# Patient Record
Sex: FEMALE | Race: WHITE | NOT HISPANIC OR LATINO | Employment: FULL TIME | URBAN - NONMETROPOLITAN AREA
[De-identification: names, ages, dates, MRNs, and addresses within clinical notes are randomized per-mention and may not be internally consistent; named-entity substitution may affect disease eponyms.]

---

## 2017-03-08 ENCOUNTER — TRANSFERRED RECORDS (OUTPATIENT)
Dept: HEALTH INFORMATION MANAGEMENT | Facility: HOSPITAL | Age: 22
End: 2017-03-08

## 2017-04-19 ENCOUNTER — TRANSFERRED RECORDS (OUTPATIENT)
Dept: HEALTH INFORMATION MANAGEMENT | Facility: HOSPITAL | Age: 22
End: 2017-04-19

## 2017-05-22 DIAGNOSIS — O26.90 PREGNANCY RELATED CONDITION, UNSPECIFIED TRIMESTER: Primary | ICD-10-CM

## 2017-05-30 ENCOUNTER — PRE VISIT (OUTPATIENT)
Dept: MATERNAL FETAL MEDICINE | Facility: CLINIC | Age: 22
End: 2017-05-30

## 2017-06-02 ENCOUNTER — OFFICE VISIT (OUTPATIENT)
Dept: MATERNAL FETAL MEDICINE | Facility: CLINIC | Age: 22
End: 2017-06-02
Attending: OBSTETRICS & GYNECOLOGY
Payer: COMMERCIAL

## 2017-06-02 ENCOUNTER — HOSPITAL ENCOUNTER (OUTPATIENT)
Dept: ULTRASOUND IMAGING | Facility: CLINIC | Age: 22
Discharge: HOME | End: 2017-06-02
Attending: OBSTETRICS & GYNECOLOGY | Admitting: OBSTETRICS & GYNECOLOGY
Payer: COMMERCIAL

## 2017-06-02 DIAGNOSIS — Z82.79 FAMILY HISTORY OF CONGENITAL ANOMALIES: Primary | ICD-10-CM

## 2017-06-02 DIAGNOSIS — O26.90 PREGNANCY RELATED CONDITION, UNSPECIFIED TRIMESTER: ICD-10-CM

## 2017-06-02 PROCEDURE — 76811 OB US DETAILED SNGL FETUS: CPT

## 2017-06-02 PROCEDURE — 96040 ZZH GENETIC COUNSELING, EACH 30 MINUTES: CPT | Mod: ZF | Performed by: GENETIC COUNSELOR, MS

## 2017-06-02 NOTE — MR AVS SNAPSHOT
"              After Visit Summary   2017    Valeria Thibodeaux    MRN: [id number]           Patient Information     Date Of Birth          1995        Visit Information        Provider Department      2017 12:15 PM Rabia Payne MD [facility]        Today's Diagnoses     Family history of congenital anomalies    -  1       Follow-ups after your visit        Who to contact     If you have questions or need follow up information about today's clinic visit or your schedule please contact [facility] F[facility]AL [facility] directly at [phone].  Normal or non-critical lab and imaging results will be communicated to you by Sanguinehart, letter or phone within 4 business days after the clinic has received the results. If you do not hear from us within 7 days, please contact the clinic through Redingtont or phone. If you have a critical or abnormal lab result, we will notify you by phone as soon as possible.  Submit refill requests through [employer] or call your pharmacy and they will forward the refill request to us. Please allow 3 business days for your refill to be completed.          Additional Information About Your Visit        MyChart Information     [employer] lets you send messages to your doctor, view your test results, renew your prescriptions, schedule appointments and more. To sign up, go to [email or web address] . Click on \"Log in\" on the left side of the screen, which will take you to the Welcome page. Then click on \"Sign up Now\" on the right side of the page.     You will be asked to enter the access code listed below, as well as some personal information. Please follow the directions to create your username and password.     Your access code is: [email or web address]  Expires: 2017  1:38 PM     Your access code will  in 90 days. If you need help or a new code, please call your [place] clinic or [phone].        Care EveryWhere ID     This is your Care " EveryWhere ID. This could be used by other organizations to access your [place] medical records  [id number]        Your Vitals Were     Last Period                   01/02/2017            Blood Pressure from Last 3 Encounters:   07/07/15 102/80    Weight from Last 3 Encounters:   07/07/15 55.3 kg (122 lb)              Today, you had the following     No orders found for display       Primary Care Provider    None       No address on file        Thank you!     Thank you for choosing [facility]  for your care. Our goal is always to provide you with excellent care. Hearing back from our patients is one way we can continue to improve our services. Please take a few minutes to complete the written survey that you may receive in the mail after your visit with us. Thank you!             Your Updated Medication List - Protect others around you: Learn how to safely use, store and throw away your medicines at www.disposemymeds.org.          This list is accurate as of: 6/2/17  1:38 PM.  Always use your most recent med list.                   Brand Name Dispense Instructions for use    desogestrel-ethinyl estradiol 0.15-0.02/0.01 MG (21/5) per tablet    MIRCETTE    3 Package    Take 1 tablet by mouth daily       ibuprofen 600 MG tablet    ADVIL/MOTRIN     Take 1 tablet by mouth 3 times daily (with meals).

## 2017-06-02 NOTE — PROGRESS NOTES
[facility]  Genetic Counseling Consult    Patient: Valeria Thibodeaux YOB: 1995       Valeria Thibodeaux was seen at the [facility] for genetic consultation as part of her appointment for comprehensive ultrasound.  The indication for genetic counseling is family history of aortic stenosis.       Impression/Plan:   Valeria's paternal male cousin was born with aortic stenosis and had a valve replacement. We reviewed the multifactorial inheritance associated with congenital left-sided heart defects. Valeria had a comprehensive (level II) ultrasound today.  Please see the ultrasound report for further details.    Pregnancy History:   /Parity:      Age at Delivery: 22 year old  REGINO: 10/9/2017, by Last Menstrual Period  Gestational Age: 21w4d    No significant complications or exposures were reported in the current pregnancy.    Family History:   A three-generation pedigree was obtained, and is scanned under the  Media  tab.   The following significant findings were reported by Valeria:    Valeria's paternal male first cousin has aortic stenosis. She is uncertain if he had a bicuspid aortic valve but she  did reported that her cousin had a valve replacement. He is now 27 years old and reportedly healthy.     Valeria also reported that her paternal uncle and paternal had a history of myocardial infarction. Her paternal grandfather  at age 49 of a myocardial infarction.    Per the patient, her father has had a normal echocardiogram.     We discussed that congenital heart defects can be caused by genetic factors, chromosomal abnormalities, or environmental exposures. Most isolated congential heart defects are likely caused by the combination of several genetic and environmental factors and have a multifactorial inheritance pattern. Research has shown a significant genetic component to isolated left sided  "congenital heart defects which includes a spectrum of cardiac anomalies ranging from bicuspid aortic valve at the milder end of the spectrum to hypoplastic left heart at the more severe end of the spectrum. The recurrence risk for a first degree relative to have a left-sided congenital heart defect is approximately 8%. An echocardiogram is recommended to screen for congenital heart defects in first degree relatives. The recurrence risk decreases with increasing degree in relationship to other family members; Valeria's pregnancy is not a first degree relative to her cousin, so the risk for a congenital heart defect may not be significantly increased.     Otherwise, the reported family history is negative for multiple miscarriages, stillbirths, birth defects, mental retardation, known genetic conditions, and consanguinity.          Risk Assessment & Testing Options:   Given the family history of aortic stenosis, we discussed screening with a comprehensive level II ultrasound.   o Comprehensive (Level II) ultrasound: Detailed ultrasound performed between 18-22 weeks gestation to screen for major birth defects and markers for aneuploidy.    We also discussed aneuploidy testing in pregnancy and Valeria had maternal serum screening earlier in pregnancy.  Quad screen - Screening for Down syndrome,trisomy 18, and open neural tube defects    Valeria reported that she had a Quad screen through her primary OB and the results were \"normal.\" A copy was not provided to us but the patient was able to access these results from her electronic medical record and provided the following information:  o Down syndrome risk= 1:706  - I reviewed with Valeria that her Quad screen result for Down syndrome is higher than her age related risk of 1 in 1087. However, the performing laboratory considers this result screen negative since it is below their cutoff value.  o Trisomy 18 risk= 1:66,500    It was a pleasure to be involved " with Valeria sung. Face-to-face time of the meeting was 35 minutes.    Jessica Sims MS, [facility]  Maternal Fetal Medicine  [facility]  Phone:[phone]  Email: [email or web address]

## 2017-06-02 NOTE — MR AVS SNAPSHOT
"              After Visit Summary   2017    Valeria Thibodeaux    MRN: [id number]           Patient Information     Date Of Birth          1995        Visit Information        Provider Department      2017 11:00 AM Jessica Sims [facility]        Today's Diagnoses     Pregnancy related condition, unspecified trimester           Follow-ups after your visit        Who to contact     If you have questions or need follow up information about today's clinic visit or your schedule please contact [facility] FETAL [facility] directly at [phone].  Normal or non-critical lab and imaging results will be communicated to you by Sqrlhart, letter or phone within 4 business days after the clinic has received the results. If you do not hear from us within 7 days, please contact the clinic through [employer] or phone. If you have a critical or abnormal lab result, we will notify you by phone as soon as possible.  Submit refill requests through Ne[employer]le[employer] or call your pharmacy and they will forward the refill request to us. Please allow 3 business days for your refill to be completed.          Additional Information About Your Visit        MyChart Information     [employer] lets you send messages to your doctor, view your test results, renew your prescriptions, schedule appointments and more. To sign up, go to [email or web address] . Click on \"Log in\" on the left side of the screen, which will take you to the Welcome page. Then click on \"Sign up Now\" on the right side of the page.     You will be asked to enter the access code listed below, as well as some personal information. Please follow the directions to create your username and password.     Your access code is: [email or web address]  Expires: 2017  1:38 PM     Your access code will  in 90 days. If you need help or a new code, please call your [place] clinic or [phone].        Care EveryWhere ID     This is " your Care EveryWhere ID. This could be used by other organizations to access your [place] medical records  [id number]        Your Vitals Were     Last Period                   01/02/2017            Blood Pressure from Last 3 Encounters:   07/07/15 102/80    Weight from Last 3 Encounters:   07/07/15 55.3 kg (122 lb)              We Performed the Following     [facility] Genetic Counseling        Primary Care Provider    None       No address on file        Thank you!     Thank you for choosing [facility]  for your care. Our goal is always to provide you with excellent care. Hearing back from our patients is one way we can continue to improve our services. Please take a few minutes to complete the written survey that you may receive in the mail after your visit with us. Thank you!             Your Updated Medication List - Protect others around you: Learn how to safely use, store and throw away your medicines at www.disposemymeds.org.          This list is accurate as of: 6/2/17  3:25 PM.  Always use your most recent med list.                   Brand Name Dispense Instructions for use    desogestrel-ethinyl estradiol 0.15-0.02/0.01 MG (21/5) per tablet    MIRCETTE    3 Package    Take 1 tablet by mouth daily       ibuprofen 600 MG tablet    ADVIL/MOTRIN     Take 1 tablet by mouth 3 times daily (with meals).

## 2017-06-28 LAB
RUBELLA ABY IGG: >0.99
RUBELLA ANTIBODY IGG QUANTITATIVE: NORMAL IU/ML

## 2017-07-17 ENCOUNTER — CARE COORDINATION (OUTPATIENT)
Dept: CARE COORDINATION | Facility: CLINIC | Age: 22
End: 2017-07-17

## 2017-07-21 ENCOUNTER — TRANSFERRED RECORDS (OUTPATIENT)
Dept: HEALTH INFORMATION MANAGEMENT | Facility: HOSPITAL | Age: 22
End: 2017-07-21

## 2017-07-21 LAB
C TRACH DNA SPEC QL PROBE+SIG AMP: NEGATIVE
C TRACH DNA SPEC QL PROBE+SIG AMP: NEGATIVE
HEP C HIM: NORMAL
HIV 1+2 AB+HIV1 P24 AG SERPL QL IA: NON REACTIVE
N GONORRHOEA DNA SPEC QL PROBE+SIG AMP: NEGATIVE
N GONORRHOEA DNA SPEC QL PROBE+SIG AMP: NEGATIVE
SPECIMEN DESCRIP: NORMAL
SPECIMEN DESCRIPTION: NORMAL
T PALLIDUM IGG SER QL: NON REACTIVE

## 2017-08-04 ENCOUNTER — TRANSFERRED RECORDS (OUTPATIENT)
Dept: HEALTH INFORMATION MANAGEMENT | Facility: HOSPITAL | Age: 22
End: 2017-08-04

## 2017-08-22 ENCOUNTER — CARE COORDINATION (OUTPATIENT)
Dept: CARE COORDINATION | Facility: CLINIC | Age: 22
End: 2017-08-22

## 2017-09-01 ENCOUNTER — TRANSFERRED RECORDS (OUTPATIENT)
Dept: HEALTH INFORMATION MANAGEMENT | Facility: HOSPITAL | Age: 22
End: 2017-09-01

## 2017-09-01 LAB
C TRACH DNA SPEC QL PROBE+SIG AMP: NEGATIVE
HEP C HIM: NORMAL
N GONORRHOEA DNA SPEC QL PROBE+SIG AMP: NEGATIVE
SPECIMEN DESCRIP: NORMAL
SPECIMEN DESCRIPTION: NORMAL

## 2017-09-05 ENCOUNTER — TELEPHONE (OUTPATIENT)
Dept: FAMILY MEDICINE | Facility: OTHER | Age: 22
End: 2017-09-05

## 2017-09-05 NOTE — TELEPHONE ENCOUNTER
2:12 PM    Reason for Call: OVERBOOK    Patient is having the following symptoms: wants to be a new ob with  was seen in the [facility] for this and wants to transfer up here to her she is 35 weeks pregnant .    The patient is requesting an appointment for ASAP with .    Was an appointment offered for this call? No  If yes : Appointment type              Date    Preferred method for responding to this message: Telephone Call  What is your phone number ?    If we cannot reach you directly, may we leave a detailed response at the number you provided? Yes    Can this message wait until your PCP/provider returns, if unavailable today? Not applicable,     Saba Aparicio

## 2017-09-05 NOTE — TELEPHONE ENCOUNTER
I will be happy to take care of the baby at delivery, but I generally do not take new OB patients this far into their pregnancies.  She can possibly establish with Ryan Hansen here, or see OB in [place].

## 2017-09-13 ENCOUNTER — TRANSFERRED RECORDS (OUTPATIENT)
Dept: HEALTH INFORMATION MANAGEMENT | Facility: HOSPITAL | Age: 22
End: 2017-09-13

## 2017-09-13 ENCOUNTER — CARE COORDINATION (OUTPATIENT)
Dept: CARE COORDINATION | Facility: CLINIC | Age: 22
End: 2017-09-13

## 2017-09-14 ENCOUNTER — PRENATAL OFFICE VISIT (OUTPATIENT)
Dept: OBGYN | Facility: OTHER | Age: 22
End: 2017-09-14
Attending: ADVANCED PRACTICE MIDWIFE
Payer: COMMERCIAL

## 2017-09-14 VITALS
WEIGHT: 152 LBS | DIASTOLIC BLOOD PRESSURE: 76 MMHG | SYSTOLIC BLOOD PRESSURE: 106 MMHG | BODY MASS INDEX: 30.64 KG/M2 | HEIGHT: 59 IN

## 2017-09-14 DIAGNOSIS — O99.810 GLUCOSE INTOLERANCE OF PREGNANCY: Primary | ICD-10-CM

## 2017-09-14 DIAGNOSIS — Z34.03 SUPERVISION OF NORMAL FIRST TEEN PREGNANCY IN THIRD TRIMESTER: ICD-10-CM

## 2017-09-14 DIAGNOSIS — Z23 NEED FOR PROPHYLACTIC VACCINATION AND INOCULATION AGAINST INFLUENZA: ICD-10-CM

## 2017-09-14 PROBLEM — F90.9 ADHD (ATTENTION DEFICIT HYPERACTIVITY DISORDER): Status: ACTIVE | Noted: 2017-09-14

## 2017-09-14 PROCEDURE — 87653 STREP B DNA AMP PROBE: CPT | Performed by: ADVANCED PRACTICE MIDWIFE

## 2017-09-14 PROCEDURE — 76815 OB US LIMITED FETUS(S): CPT | Performed by: ADVANCED PRACTICE MIDWIFE

## 2017-09-14 PROCEDURE — 90471 IMMUNIZATION ADMIN: CPT | Performed by: ADVANCED PRACTICE MIDWIFE

## 2017-09-14 PROCEDURE — 90686 IIV4 VACC NO PRSV 0.5 ML IM: CPT | Performed by: ADVANCED PRACTICE MIDWIFE

## 2017-09-14 PROCEDURE — 99207 ZZC FIRST OB VISIT: CPT | Mod: 25 | Performed by: ADVANCED PRACTICE MIDWIFE

## 2017-09-14 RX ORDER — PRENATAL VIT/IRON FUM/FOLIC AC 27MG-0.8MG
1 TABLET ORAL DAILY
COMMUNITY
End: 2018-01-17

## 2017-09-14 NOTE — MR AVS SNAPSHOT
After Visit Summary   9/14/2017    Valeria Thibodeaux    MRN: [id number]           Patient Information     Date Of Birth          1995        Visit Information        Provider Department      9/14/2017 10:30 AM Ryan Hansen APRN CNM [facility]        Today's Diagnoses     Glucose intolerance of pregnancy    -  1    Supervision of normal first teen pregnancy in third trimester        Need for prophylactic vaccination and inoculation against influenza          Care Instructions    Return to office in one week for prenatal care and as needed.    Thank you for allowing Ryan SANDRA CNM and our OB team to participate in your care.  If you have a scheduling or an appointment question please contact [facility] Health Unit Coordinator at their direct line [phone].   ALL nursing questions or concerns can be directed to your OB nurse at: [phone] - leah                  Follow-ups after your visit        Additional Services     DIABETES EDUCATION REFERRAL ([place])       Your provider [facility] [place]    This is a New Diagnosis  Type of diabetes is Glucose Intolerance in pregnancy.                                                         Diabetes education needs: Diabetic Education. Low Glycemic Diet. Teach blood glucose monitoring. This patient is to learn how to test, but does not need to start testing at home unless diagnosed with gestational diabetes or directed by Dr. To.  Request focused on: Low glycemic diet and teaching home blood glucose monitoring.  Education needs: None       Please be aware that coverage of these services is subject to the terms and limitations of your health insurance plan.  Call member services at your health plan with any benefit or coverage questions.      Please bring the following to your appointment:    >>   Any x-rays, CTs or MRIs which have been performed.  Contact the facility where they were  done to arrange for  prior to your scheduled appointment.  Any new CT, MRI or other procedures ordered by your specialist must be performed at a [place] facility or coordinated by your clinic's referral office.    >>   List of current medications   >>   This referral request   >>   Any documents/labs given to you for this referral                  Your next 10 appointments already scheduled     Sep 18, 2017 11:30 AM CDT   (Arrive by 11:15 AM)   ESTABLISHED PRENATAL with JOCY Garcia CNM   [facility] [place] ([facility] [place] )    [address]  [place] [address]   [phone]              Future tests that were ordered for you today     Open Future Orders        Priority Expected Expires Ordered    Hemoglobin A1c Routine 9/15/2017 9/14/2018 9/14/2017    CBC with platelets Routine 9/15/2017 9/14/2018 9/14/2017    Glucose Routine 9/14/2017 10/14/2017 9/14/2017    Glucose 2 hr pp 75 gm dose Routine 9/14/2017 10/14/2017 9/14/2017    FETAL NON-STRESS TEST ASAP 9/14/2017 10/14/2017 9/14/2017            Who to contact     If you have questions or need follow up information about today's clinic visit or your schedule please contact [facility] directly at [phone].  Normal or non-critical lab and imaging results will be communicated to you by MyChart, letter or phone within 4 business days after the clinic has received the results. If you do not hear from us within 7 days, please contact the clinic through MyChart or phone. If you have a critical or abnormal lab result, we will notify you by phone as soon as possible.  Submit refill requests through [employer] or call your pharmacy and they will forward the refill request to us. Please allow 3 business days for your refill to be completed.          Additional Information About Your Visit        [employer] Information     [employer] lets you send messages to your doctor, view your test results, renew your prescriptions,  "schedule appointments and more. To sign up, go to [email or web address] . Click on \"Log in\" on the left side of the screen, which will take you to the Welcome page. Then click on \"Sign up Now\" on the right side of the page.     You will be asked to enter the access code listed below, as well as some personal information. Please follow the directions to create your username and password.     Your access code is: [email or web address]  Expires: 2017  4:49 PM     Your access code will  in 90 days. If you need help or a new code, please call your [place] clinic or [phone].        Care EveryWhere ID     This is your Care EveryWhere ID. This could be used by other organizations to access your [place] medical records  [id number]        Your Vitals Were     Height Last Period BMI (Body Mass Index)             4' 11\" (1.499 m) 2017 30.7 kg/m2          Blood Pressure from Last 3 Encounters:   17 106/76   07/07/15 102/80    Weight from Last 3 Encounters:   17 152 lb (68.9 kg)   07/07/15 122 lb (55.3 kg)              We Performed the Following     DIABETES EDUCATION REFERRAL ([place])     FLU VAC, SPLIT VIRUS IM > 3 YO (QUADRIVALENT) [35486]     Group B strep PCR     US OB Biophys Single Gestation Measure     Vaccine Administration, Initial [84436]        Primary Care Provider    None       No address on file        Equal Access to Services     [place] : Hadii rober zimmermano Sosumit, waaxda luqadaha, qaybta kaalmada liz patel [facility] arita . So [facility] [phone].    ATENCIÓN: Si habla español, tiene a rodriguez disposición servicios gratuitos de asistencia lingüística. Llame al [phone].    We comply with applicable federal civil rights laws and [place] laws. We do not discriminate on the basis of race, color, national origin, age, disability sex, sexual orientation or gender identity.            Thank you!     Thank you for choosing [facility]  for " your care. Our goal is always to provide you with excellent care. Hearing back from our patients is one way we can continue to improve our services. Please take a few minutes to complete the written survey that you may receive in the mail after your visit with us. Thank you!             Your Updated Medication List - Protect others around you: Learn how to safely use, store and throw away your medicines at www.disposemymeds.org.          This list is accurate as of: 9/14/17  4:49 PM.  Always use your most recent med list.                   Brand Name Dispense Instructions for use Diagnosis    prenatal multivitamin plus iron 27-0.8 MG Tabs per tablet      Take 1 tablet by mouth daily        ZOLOFT PO      Take 50 mg by mouth daily

## 2017-09-14 NOTE — PROGRESS NOTES
Breastfeeding education provided:  - Supporting a non-medicated birth  - Skin to Skin  - Non-separation of mother and baby    Adriane Bautista

## 2017-09-14 NOTE — PATIENT INSTRUCTIONS
Return to office in one week for prenatal care and as needed.    Thank you for allowing Ryan SANDRA CNM and our OB team to participate in your care.  If you have a scheduling or an appointment question please contact [facility] Health Unit Coordinator at their direct line [phone].   ALL nursing questions or concerns can be directed to your OB nurse at: [phone] - Laura
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
## 2017-09-14 NOTE — PROGRESS NOTES
Injectable Influenza Immunization Documentation    1.  Are you sick today? (Fever of 100.5 or higher on the day of the clinic)   No    2.  Have you ever had Guillain-Raynham Syndrome within 6 weeks of an influenza vaccionation?  No    3. Do you have a life-threatening allergy to eggs?  No    4. Do you have a life-threatening allergy to a component of the vaccine? May include antibiotics, gelatin or latex.  No     5. Have you ever had a reaction to a dose of flu vaccine that needed immediate medical attention?  No     Form completed by Betzy Macias

## 2017-09-15 LAB
GP B STREP DNA SPEC QL NAA+PROBE: NEGATIVE
SPECIMEN SOURCE: NORMAL

## 2017-09-20 ENCOUNTER — PRENATAL OFFICE VISIT (OUTPATIENT)
Dept: OBGYN | Facility: OTHER | Age: 22
End: 2017-09-20
Attending: ADVANCED PRACTICE MIDWIFE
Payer: COMMERCIAL

## 2017-09-20 ENCOUNTER — HOSPITAL ENCOUNTER (OUTPATIENT)
Dept: ULTRASOUND IMAGING | Facility: HOSPITAL | Age: 22
Discharge: HOME | End: 2017-09-20
Attending: ADVANCED PRACTICE MIDWIFE | Admitting: ADVANCED PRACTICE MIDWIFE
Payer: COMMERCIAL

## 2017-09-20 ENCOUNTER — HOSPITAL ENCOUNTER (OUTPATIENT)
Facility: HOSPITAL | Age: 22
Discharge: HOME | End: 2017-09-20
Attending: ADVANCED PRACTICE MIDWIFE | Admitting: ADVANCED PRACTICE MIDWIFE
Payer: COMMERCIAL

## 2017-09-20 VITALS
TEMPERATURE: 98.1 F | HEIGHT: 60 IN | BODY MASS INDEX: 30.43 KG/M2 | RESPIRATION RATE: 16 BRPM | SYSTOLIC BLOOD PRESSURE: 112 MMHG | DIASTOLIC BLOOD PRESSURE: 72 MMHG | HEART RATE: 85 BPM | WEIGHT: 155 LBS

## 2017-09-20 VITALS — SYSTOLIC BLOOD PRESSURE: 112 MMHG | DIASTOLIC BLOOD PRESSURE: 78 MMHG | HEART RATE: 78 BPM

## 2017-09-20 DIAGNOSIS — Z34.03 SUPERVISION OF NORMAL FIRST PREGNANCY IN THIRD TRIMESTER: ICD-10-CM

## 2017-09-20 DIAGNOSIS — Z34.03 SUPERVISION OF NORMAL FIRST TEEN PREGNANCY IN THIRD TRIMESTER: ICD-10-CM

## 2017-09-20 DIAGNOSIS — O99.810 GLUCOSE INTOLERANCE OF PREGNANCY: ICD-10-CM

## 2017-09-20 PROBLEM — Z36.89 ENCOUNTER FOR TRIAGE IN PREGNANT PATIENT: Status: ACTIVE | Noted: 2017-09-20

## 2017-09-20 LAB
ABO + RH BLD: NORMAL
ABO + RH BLD: NORMAL
AMPHETAMINES UR QL SCN: NEGATIVE
BARBITURATES UR QL: NEGATIVE
BENZODIAZ UR QL: NEGATIVE
BLD GP AB SCN SERPL QL: NORMAL
BLOOD BANK CMNT PATIENT-IMP: NORMAL
CANNABINOIDS UR QL SCN: NEGATIVE
COCAINE UR QL: NEGATIVE
ERYTHROCYTE [DISTWIDTH] IN BLOOD BY AUTOMATED COUNT: 12.2 % (ref 10–15)
EST. AVERAGE GLUCOSE BLD GHB EST-MCNC: 114 MG/DL
GLUCOSE 2H P 75 G GLC PO SERPL-MCNC: 123 MG/DL (ref 60–200)
GLUCOSE BLD-MCNC: 74 MG/DL (ref 70–99)
HBA1C MFR BLD: 5.6 % (ref 4.3–6)
HCT VFR BLD AUTO: 29 % (ref 35–47)
HGB BLD-MCNC: 9.7 G/DL (ref 11.7–15.7)
MCH RBC QN AUTO: 29.7 PG (ref 26.5–33)
MCHC RBC AUTO-ENTMCNC: 33.4 G/DL (ref 31.5–36.5)
MCV RBC AUTO: 89 FL (ref 78–100)
METHADONE UR QL SCN: NEGATIVE
OPIATES UR QL SCN: NEGATIVE
PCP UR QL SCN: NEGATIVE
PLATELET # BLD AUTO: 191 10E9/L (ref 150–450)
RBC # BLD AUTO: 3.27 10E12/L (ref 3.8–5.2)
SPECIMEN EXP DATE BLD: NORMAL
WBC # BLD AUTO: 6.8 10E9/L (ref 4–11)

## 2017-09-20 PROCEDURE — 36415 COLL VENOUS BLD VENIPUNCTURE: CPT | Performed by: ADVANCED PRACTICE MIDWIFE

## 2017-09-20 PROCEDURE — 86901 BLOOD TYPING SEROLOGIC RH(D): CPT | Performed by: ADVANCED PRACTICE MIDWIFE

## 2017-09-20 PROCEDURE — 80307 DRUG TEST PRSMV CHEM ANLYZR: CPT | Performed by: ADVANCED PRACTICE MIDWIFE

## 2017-09-20 PROCEDURE — 59025 FETAL NON-STRESS TEST: CPT | Mod: 26 | Performed by: ADVANCED PRACTICE MIDWIFE

## 2017-09-20 PROCEDURE — 85027 COMPLETE CBC AUTOMATED: CPT | Performed by: ADVANCED PRACTICE MIDWIFE

## 2017-09-20 PROCEDURE — 86900 BLOOD TYPING SEROLOGIC ABO: CPT | Performed by: ADVANCED PRACTICE MIDWIFE

## 2017-09-20 PROCEDURE — 59025 FETAL NON-STRESS TEST: CPT

## 2017-09-20 PROCEDURE — 82947 ASSAY GLUCOSE BLOOD QUANT: CPT | Performed by: ADVANCED PRACTICE MIDWIFE

## 2017-09-20 PROCEDURE — 82950 GLUCOSE TEST: CPT | Performed by: ADVANCED PRACTICE MIDWIFE

## 2017-09-20 PROCEDURE — 86850 RBC ANTIBODY SCREEN: CPT | Performed by: ADVANCED PRACTICE MIDWIFE

## 2017-09-20 PROCEDURE — 76819 FETAL BIOPHYS PROFIL W/O NST: CPT | Mod: TC

## 2017-09-20 PROCEDURE — 76816 OB US FOLLOW-UP PER FETUS: CPT | Mod: TC

## 2017-09-20 PROCEDURE — 99207 ZZC COMPLICATED OB VISIT: CPT | Performed by: ADVANCED PRACTICE MIDWIFE

## 2017-09-20 PROCEDURE — 83036 HEMOGLOBIN GLYCOSYLATED A1C: CPT | Performed by: ADVANCED PRACTICE MIDWIFE

## 2017-09-20 ASSESSMENT — PAIN SCALES - GENERAL: PAINLEVEL: NO PAIN (0)

## 2017-09-20 NOTE — PATIENT INSTRUCTIONS
Return to office next week for prenatal care and as needed.    Thank you for allowing Ryan SANDRA CNM and our OB team to participate in your care.  If you have a scheduling or an appointment question please contact [facility] Coordinator at their direct line [phone].   ALL nursing questions or concerns can be directed to your OB nurse at: [phone] - Laura

## 2017-09-20 NOTE — PLAN OF CARE
Valeria Thibodeaux is a 22 year old  and 37w2d patient came in for Non Stress Test    Patient Active Problem List   Diagnosis     Tobacco dependence     ADHD (attention deficit hyperactivity disorder)     Supervision of normal first pregnancy in third trimester     Glucose intolerance of pregnancy     Encounter for triage in pregnant patient       Pt discharged to home at 11:36 AM and encouraged to rest and drink plenty of fluids.  Pt told to call/return if bleeding more than spotting, water breaks, contractions 3-5 minutes apart that she has to breath through.     Nursing education on pregnancy risks provided. Self-management instructions reviewed.AVS given and signed. All questions answered and patient verbalizes understanding.    /72  Pulse 85  Temp 98.1  F (36.7  C) (Oral)  Resp 16  Ht 1.524 m (5')  Wt 70.3 kg (155 lb)  LMP 2017  BMI 30.27 kg/m2    Cervical status: not examined  Fetal Assessment: Reactive    Discharge support:  Independent-Alone    Obstetric History       T0      L0     SAB0   TAB0   Ectopic0   Multiple0   Live Births0       # Outcome Date GA Lbr Mark/2nd Weight Sex Delivery Anes PTL Lv   1 Current                   JOHAN ELAM

## 2017-09-20 NOTE — PROGRESS NOTES
Doing well.  Baby active.  Denies bleeding or LOF.  Reports contractions/BH every day.    Discussed:  Labor, has [facility]BC number, Pain meds for labor, PIH, kick counts, BF.  GTT 2 hr today, antibody screen, and UDS    US today at 10:00 am  Rec'd prenatals via Fax     Plan to discuss NB cares next week.    RTO in one week for prenatal care and as needed.

## 2017-09-20 NOTE — NURSING NOTE
"Chief Complaint   Patient presents with     Prenatal Care       Initial /78  Pulse 78  LMP 01/02/2017 Estimated body mass index is 30.7 kg/(m^2) as calculated from the following:    Height as of 9/14/17: 4' 11\" (1.499 m).    Weight as of 9/14/17: 152 lb (68.9 kg).  Medication Reconciliation: complete   Adriane Bautista      "

## 2017-09-20 NOTE — MR AVS SNAPSHOT
After Visit Summary   9/20/2017    Vlaeria Thibodeaux    MRN: [id number]           Patient Information     Date Of Birth          1995        Visit Information        Provider Department      9/20/2017 9:00 AM Ryan Hansen APRN CNM [facility]        Today's Diagnoses     Supervision of normal first pregnancy in third trimester          Care Instructions    Return to office next week for prenatal care and as needed.    Thank you for allowing Ryan SANDRA CNM and our OB team to participate in your care.  If you have a scheduling or an appointment question please contact [facility] at their direct line [phone].   ALL nursing questions or concerns can be directed to your OB nurse at: [phone] - Laura              Follow-ups after your visit        Your next 10 appointments already scheduled     Sep 27, 2017 11:00 AM CDT   (Arrive by 10:45 AM)   ESTABLISHED PRENATAL with JOCY Garcia CNM   [facility] ([facility] )    [address] [place] [id number] [phone]            Sep 27, 2017  3:00 PM CDT   Radiology with [facility]   [facility] ([facility] )    [address] [place] [id number] [phone]            Oct 03, 2017 10:30 AM CDT   (Arrive by 10:15 AM)   Diabetic Education with Kena Casey RD   [facility] ([facility] )    [address] [place] [phone]              Future tests that were ordered for you today     Open Future Orders        Priority Expected Expires Ordered    Antibody screen red cell STAT 9/20/2017 10/20/2017 9/20/2017            Who to contact     If you have questions or need follow up information about today's clinic visit or your schedule please contact [facility] directly at [phone].  Normal or non-critical lab and imaging results will be communicated to you by Josee, letter  "or phone within 4 business days after the clinic has received the results. If you do not hear from us within 7 days, please contact the clinic through [employer] or phone. If you have a critical or abnormal lab result, we will notify you by phone as soon as possible.  Submit refill requests through [employer] or call your pharmacy and they will forward the refill request to us. Please allow 3 business days for your refill to be completed.          Additional Information About Your Visit        [employer] Information     [employer] lets you send messages to your doctor, view your test results, renew your prescriptions, schedule appointments and more. To sign up, go to [email or web address] . Click on \"Log in\" on the left side of the screen, which will take you to the Welcome page. Then click on \"Sign up Now\" on the right side of the page.     You will be asked to enter the access code listed below, as well as some personal information. Please follow the directions to create your username and password.     Your access code is: [email or web address]  Expires: 2017  4:49 PM     Your access code will  in 90 days. If you need help or a new code, please call your [facility] clinic or [phone].        Care EveryWhere ID     This is your Care EveryWhere ID. This could be used by other organizations to access your [facility] medical records  [id number]        Your Vitals Were     Pulse Last Period                78 2017           Blood Pressure from Last 3 Encounters:   17 112/72   17 112/78   17 106/76    Weight from Last 3 Encounters:   17 155 lb (70.3 kg)   17 152 lb (68.9 kg)   07/07/15 122 lb (55.3 kg)              We Performed the Following     ABO/Rh type and screen     Drug Screen Urine (Range)        Primary Care Provider    None       No address on file        Equal Access to Services     NARINDER HILL : fabiola Donaldson, liz melo " nellie chensophia tishanai salazaraan ah. So [facility] [phone].    ATENCIÓN: Si habla maria g, tiene a rodriguez disposición servicios gratuitos de asistencia lingüística. Caleb al [phone].    We comply with applicable federal civil rights laws and [place] laws. We do not discriminate on the basis of race, color, national origin, age, disability sex, sexual orientation or gender identity.            Thank you!     Thank you for choosing [facility]  for your care. Our goal is always to provide you with excellent care. Hearing back from our patients is one way we can continue to improve our services. Please take a few minutes to complete the written survey that you may receive in the mail after your visit with us. Thank you!             Your Updated Medication List - Protect others around you: Learn how to safely use, store and throw away your medicines at www.disposemymeds.org.          This list is accurate as of: 9/20/17 10:55 AM.  Always use your most recent med list.                   Brand Name Dispense Instructions for use Diagnosis    prenatal multivitamin plus iron 27-0.8 MG Tabs per tablet      Take 1 tablet by mouth daily        ZOLOFT PO      Take 50 mg by mouth daily

## 2017-09-20 NOTE — IP AVS SNAPSHOT
[facility]    [address] [address]    Phone:  [phone]    Fax:  [phone]                                       After Visit Summary   9/20/2017    Valeria Thibodeaux    MRN: [id number]           After Visit Summary Signature Page     I have received my discharge instructions, and my questions have been answered. I have discussed any challenges I see with this plan with the nurse or doctor.    ..........................................................................................................................................  Patient/Patient Representative Signature      ..........................................................................................................................................  Patient Representative Print Name and Relationship to Patient    ..................................................               ................................................  Date                                            Time    ..........................................................................................................................................  Reviewed by Signature/Title    ...................................................              ..............................................  Date                                                            Time

## 2017-09-20 NOTE — PLAN OF CARE
.Valeria Thibodeaux        NST:  reactive  Start:1100  Stop:1120    Physician: Ryan Hansen CNM  Reason For Test: establish care  EDC:10/9/2017  Gestational Age: 37 2/7     Comments:  BPP today was 8/8 prior to NST here.      JOHAN ELAM

## 2017-09-20 NOTE — PLAN OF CARE
Patient here for NST only at this time per Ryan Hansen CNM.  Patient is establishing care with Ryan, transferring from [facility].  Patient appears comfortable no distress. No contractions. No leaking fluid. No bleeding.  BPP done today as well with 8/8.

## 2017-09-20 NOTE — IP AVS SNAPSHOT
MRN:[id number]                      After Visit Summary   9/20/2017    Valeria Thibodeaux    MRN: [id number]           Thank you!     Thank you for choosing [facility] for your care. Our goal is always to provide you with excellent care. Hearing back from our patients is one way we can continue to improve our services. Please take a few minutes to complete the written survey that you may receive in the mail after you visit with us. Thank you!        Patient Information     Date Of Birth          1995        About your hospital stay     You were admitted on:  September 20, 2017 You last received care in the:  [facility]    You were discharged on:  September 20, 2017       Who to Call     For medical emergencies, please call 911.  For non-urgent questions about your medical care, please call your primary care provider or clinic, None          Attending Provider     Provider Specialty    Ryan Hansen APRN CNM [facility]       Primary Care Provider    None      Your next 10 appointments already scheduled     Sep 27, 2017 11:00 AM CDT   (Arrive by 10:45 AM)   ESTABLISHED PRENATAL with JOCY Garcia CNM   [facility] ([facility] )    [address] [place] [address]   [phone]            Sep 27, 2017  3:00 PM CDT   Radiology with HI ULTRASOUND 2   [facility] Ultrasound ([facility] )    [address] [place] [address] [id number] [phone]            Oct 03, 2017 10:30 AM CDT   (Arrive by 10:15 AM)   Diabetic Education with Kena Casey RD   [facility] Diabetes Education ([facility] )    [address] [place] [id number] [phone]              Further instructions from your care team       Discharge Instructions for Undelivered Patients    Diet:  * Drink 8 to 12 glasses of liquids (milk, juice, water) every day  * You may eat meals and snacks.    Activity:  * Count fetal kicks every day.  * Call your doctor if your  "baby is moving less than usual.    Call your provider if you notice:  * Swelling in your face or increased swelling in your hands or legs.  * Headaches that are not relieved by Tylenol (acetaminophen).  * Changes in your vision (blurring; seeing spots or stars).  * Nausea (sick to your stomach) and vomiting (throwing up).  * Weight gain of 5 pounds per week.  * Heartburn that doesn't go away.  * Signs of bladder infection: Pain when you urinate (use the toilet), needing to go more often or more urgently.  * The bag of glynn (membrane) breaks, or you notice leaking in your underwear.  * Bright red blood in your underwear.  * Abdominal (lower belly) or stomach pain.  * For first baby: Contractions (tightenings) less than 5 minutes apart for one hour or more.  * Second (plus) baby: Contractions (tightenings) less than 10 minutes apart and getting stronger.  * Increase or change in vaginal discharge (note the color and amount).      [facility]: [phone]        Pending Results     Date and Time Order Name Status Description    9/20/2017 0952 ABO/RH TYPE AND SCREEN In process     9/20/2017 0857 HEMOGLOBIN A1C In process     9/20/2017 0857 GLUCOSE 2 HR PP 75 GM DOSE (RANGE ONLY) In process             Admission Information     Date & Time Provider Department Dept. Phone    9/20/2017 Ryan Hansen, JOCY [facility] Labor and Delivery [phone]      Your Vitals Were     Blood Pressure Pulse Temperature Respirations Height Weight    112/72 85 98.1  F (36.7  C) (Oral) 16 1.524 m (5') 70.3 kg (155 lb)    Last Period BMI (Body Mass Index)                01/02/2017 30.27 kg/m2          Streetcar Information     Streetcar lets you send messages to your doctor, view your test results, renew your prescriptions, schedule appointments and more. To sign up, go to [email or web address] . Click on \"Log in\" on the left side of the screen, which will take you to the Welcome page. Then click on \"Sign up Now\" on the " right side of the page.     You will be asked to enter the access code listed below, as well as some personal information. Please follow the directions to create your username and password.     Your access code is: [email or web address]  Expires: 2017  4:49 PM     Your access code will  in 90 days. If you need help or a new code, please call your [place] clinic or [phone].        Care EveryWhere ID     This is your Care EveryWhere ID. This could be used by other organizations to access your [facility] medical records  [id number]        Equal Access to Services     [facility]: Hadkayden Conner, fabiola stauffer, alfredo patel, liz arita . So [facility] [phone].    ATENCIÓN: Si habla español, tiene a rodriguez disposición servicios gratuitos de asistencia lingüística. Llame al [phone].    We comply with applicable federal civil rights laws and [place] laws. We do not discriminate on the basis of race, color, national origin, age, disability sex, sexual orientation or gender identity.               Review of your medicines      UNREVIEWED medicines. Ask your doctor about these medicines        Dose / Directions    prenatal multivitamin plus iron 27-0.8 MG Tabs per tablet        Dose:  1 tablet   Take 1 tablet by mouth daily   Refills:  0       ZOLOFT PO        Dose:  50 mg   Take 50 mg by mouth daily   Refills:  0                Protect others around you: Learn how to safely use, store and throw away your medicines at www.disposemymeds.org.             Medication List: This is a list of all your medications and when to take them. Check marks below indicate your daily home schedule. Keep this list as a reference.      Medications           Morning Afternoon Evening Bedtime As Needed    prenatal multivitamin plus iron 27-0.8 MG Tabs per tablet   Take 1 tablet by mouth daily                                ZOLOFT PO   Take 50 mg by mouth daily                                           More Information        Labor and Childbirth: Active Labor  During active labor, your contractions will be stronger and more rhythmic than with early labor. They peak and subside like waves. They may happen 3 to 5 minutes apart and last about 45 to 60 seconds. This part of labor can be hard work. But it is often shorter than early labor. When you reach active labor, exams and tests will be done to see how you and your baby are doing.     Your cervix may dilate 4 to 8 centimeters during the first part of active labor.   Evaluating you and your baby  An exam tells how you and your baby are responding to contractions. Your blood pressure, temperature, and pulse will be checked. A blood or urine sample may also be taken. A fetal monitor will be used to check your baby s heart rate. Sometimes an IV (intravenous) line is started to give you medicine and fluids.  Moving ahead with labor  You may now feel contractions in your whole stomach instead of just the lower part (like during early labor). If your amniotic sac has not broken already, it may break now. Or, it may be broken for you. To help your baby descend, change position often. Walking or sitting in a rocking chair or recliner may help. You may find it hard to relax even though you are tired. You may also be less interested in talking than you were earlier. If you re having anesthesia, you will get it now.   Special issues during labor  If labor doesn t progress well or a problem arises, you may need a . But your healthcare providers may take certain steps to help you avoid a :    If your cervix isn t dilating, a medicine (oxytocin) may be used to augment labor.    If fetal monitoring shows your baby isn t getting enough oxygen, shifting your body position may help. You may also be given oxygen through a mask.    If you have preeclampsia (a condition that results in high blood pressure, swelling, and other symptoms), you may  be given medicines by IV (intravenous). Your healthcare provider may also tell you to lie on your left side.  Responding to contractions  During contractions, try to stay relaxed. Tense muscles use more oxygen, eat up your body s energy, and increase pain. Use the breathing and relaxation techniques you may have learned. And let your support person know how he or she can help. If you ve had problems during a previous birth, focus on the present. Keep in mind that no 2 births are the same.     Support person s note  Here's how you can help:    Have the mother walk or change positions at least once an hour. This improves circulation and helps the baby descend.    Keep reminding the mother to breathe and relax through each contraction.    Reassure her. Try to keep her from getting anxious or overstressed.    Take care of yourself. Take a short break to eat or go to the bathroom when you need to.    Rest when the mother does. You ll both benefit.   Date Last Reviewed: 8/16/2015 2000-2017 [employer]. [address] [place] [address]. All rights reserved. This information is not intended as a substitute for professional medical care. Always follow your healthcare professional's instructions.                Labor: Preparing for the Hospital    During the final weeks of your pregnancy, you may have irregular contractions. You may feel a drop in your baby s position. And the profile of your stomach may look a little different. Because due dates are not exact, have your bag packed and ready for the hospital.  Packing for the hospital  Here are some items you may want to have ready for the hospital:    A watch or clock with a second hand    Insurance card and identification    Nursing bra, nursing pads, and maternity underwear    Toiletries    Something to entertain yourself, like books or a handheld computer    Heavy socks or slippers and a robe    Clips for your hair, a brush, and lip balm    An  MP3 or other music player    A camera or video camera and     Important phone numbers or email addresses    Going-home outfits for you and your baby    A car seat to take your baby home in  Leaving for the hospital  Follow the instructions you ve been given on when to leave for the hospital. You may be told to call your healthcare provider when it becomes hard to walk or talk during contractions or if your amniotic sac breaks. If your partner makes the phone call for you, be nearby. That way, your healthcare provider can speak to you directly. Many women are told to go to the hospital after an hour of contractions that come 3 to 5 minutes apart. Leave sooner if the hospital is not nearby or is hard to get to.  Date Last Reviewed: 8/16/2015 2000-2017 [employer]. [address] [place] [address], [place] [address]. All rights reserved. This information is not intended as a substitute for professional medical care. Always follow your healthcare professional's instructions.

## 2017-09-20 NOTE — DISCHARGE INSTRUCTIONS

## 2017-09-27 ENCOUNTER — PRENATAL OFFICE VISIT (OUTPATIENT)
Dept: OBGYN | Facility: OTHER | Age: 22
End: 2017-09-27
Attending: ADVANCED PRACTICE MIDWIFE
Payer: COMMERCIAL

## 2017-09-27 VITALS
HEART RATE: 79 BPM | HEIGHT: 60 IN | SYSTOLIC BLOOD PRESSURE: 122 MMHG | DIASTOLIC BLOOD PRESSURE: 80 MMHG | BODY MASS INDEX: 31.22 KG/M2 | WEIGHT: 159 LBS

## 2017-09-27 DIAGNOSIS — Z34.03 SUPERVISION OF NORMAL FIRST PREGNANCY IN THIRD TRIMESTER: Primary | ICD-10-CM

## 2017-09-27 LAB
ALBUMIN UR-MCNC: 10 MG/DL
APPEARANCE UR: CLEAR
BACTERIA #/AREA URNS HPF: ABNORMAL /HPF
BILIRUB UR QL STRIP: NEGATIVE
COLOR UR AUTO: YELLOW
GLUCOSE UR STRIP-MCNC: NEGATIVE MG/DL
HGB UR QL STRIP: NEGATIVE
KETONES UR STRIP-MCNC: NEGATIVE MG/DL
LEUKOCYTE ESTERASE UR QL STRIP: NEGATIVE
MUCOUS THREADS #/AREA URNS LPF: PRESENT /LPF
NITRATE UR QL: NEGATIVE
PH UR STRIP: 6.5 PH (ref 4.7–8)
RBC #/AREA URNS AUTO: <1 /HPF (ref 0–2)
SOURCE: ABNORMAL
SP GR UR STRIP: 1.01 (ref 1–1.03)
SQUAMOUS #/AREA URNS AUTO: 5 /HPF (ref 0–1)
UROBILINOGEN UR STRIP-MCNC: NORMAL MG/DL (ref 0–2)
WBC #/AREA URNS AUTO: 1 /HPF (ref 0–2)

## 2017-09-27 PROCEDURE — 99207 ZZC PRENATAL VISIT: CPT | Performed by: ADVANCED PRACTICE MIDWIFE

## 2017-09-27 PROCEDURE — 81001 URINALYSIS AUTO W/SCOPE: CPT | Performed by: ADVANCED PRACTICE MIDWIFE

## 2017-09-27 ASSESSMENT — PAIN SCALES - GENERAL: PAINLEVEL: NO PAIN (0)

## 2017-09-27 NOTE — NURSING NOTE
Chief Complaint   Patient presents with     Prenatal Care       Initial /80  Pulse 79  Ht 5' (1.524 m)  Wt 159 lb (72.1 kg)  LMP 01/02/2017  BMI 31.05 kg/m2 Estimated body mass index is 31.05 kg/(m^2) as calculated from the following:    Height as of this encounter: 5' (1.524 m).    Weight as of this encounter: 159 lb (72.1 kg).  Medication Reconciliation: complete     Adriane Bautista

## 2017-09-27 NOTE — PATIENT INSTRUCTIONS
Return to office in one week for prenatal care and as needed.    Thank you for allowing Ryan SANDRA CNM and our OB team to participate in your care.  If you have a scheduling or an appointment question please contact [facility] Unit Coordinator at their direct line [phone].   ALL nursing questions or concerns can be directed to your OB nurse at: [phone] - Laura

## 2017-09-27 NOTE — PROGRESS NOTES
Doing well.  Baby active.  3+ edema bilateral   UA with micro reflex to culture    Discussed:  Labor, when to go to the hospital, kick count, has [facility] number.    Birth plan:  Reviewed.  Copy to [facility].    Plan NB anticipatory guidance next visit.    RTO in one week for prenatal care and PRN.    JOCY Aguero, CNM

## 2017-09-27 NOTE — MR AVS SNAPSHOT
After Visit Summary   9/27/2017    Valeria Thibodeaux    MRN: [id number]           Patient Information     Date Of Birth          1995        Visit Information        Provider Department      9/27/2017 11:00 AM Ryan Hansen APRN CNM [facility]        Today's Diagnoses     Supervision of normal first pregnancy in third trimester    -  1      Care Instructions    Return to office in one week for prenatal care and as needed.    Thank you for allowing Ryan SANDRA CNM and our OB team to participate in your care.  If you have a scheduling or an appointment question please contact [facility] Unit Coordinator at their direct line [phone].   ALL nursing questions or concerns can be directed to your OB nurse at: [phone] - Laura              Follow-ups after your visit        Your next 10 appointments already scheduled     Oct 03, 2017 10:30 AM CDT   (Arrive by 10:15 AM)   Diabetic Education with Kena Casey RD   [facility] Diabetes Education ([facility] )    [address] [place] [id number] [phone]              Who to contact     If you have questions or need follow up information about today's clinic visit or your schedule please contact [facility] directly at [phone].  Normal or non-critical lab and imaging results will be communicated to you by MyChart, letter or phone within 4 business days after the clinic has received the results. If you do not hear from us within 7 days, please contact the clinic through MyChart or phone. If you have a critical or abnormal lab result, we will notify you by phone as soon as possible.  Submit refill requests through [employer] or call your pharmacy and they will forward the refill request to us. Please allow 3 business days for your refill to be completed.          Additional Information About Your Visit        PersonalisharCornice Information     [employer]-Sec lets you send messages to your doctor, view your  "test results, renew your prescriptions, schedule appointments and more. To sign up, go to [email or web address] . Click on \"Log in\" on the left side of the screen, which will take you to the Welcome page. Then click on \"Sign up Now\" on the right side of the page.     You will be asked to enter the access code listed below, as well as some personal information. Please follow the directions to create your username and password.     Your access code is: [email or web address]  Expires: 2017  4:49 PM     Your access code will  in 90 days. If you need help or a new code, please call your [place] clinic or [phone].        Care EveryWhere ID     This is your Care EveryWhere ID. This could be used by other organizations to access your [place] medical records  [id number]        Your Vitals Were     Pulse Height Last Period BMI (Body Mass Index)          79 5' (1.524 m) 2017 31.05 kg/m2         Blood Pressure from Last 3 Encounters:   17 122/80   17 112/72   17 112/78    Weight from Last 3 Encounters:   17 159 lb (72.1 kg)   17 155 lb (70.3 kg)   17 152 lb (68.9 kg)              We Performed the Following     UA with Microscopic reflex to Culture        Primary Care Provider    Physician No Ref-Primary       No address on file        Equal Access to Services     [facility] : Hadii rober Conner, waaxda luqadaha, qaybta kaalmada adesophiayada, liz weiss. So [facility] [phone].    ATENCIÓN: Si habla español, tiene a rodriguez disposición servicios gratuitos de asistencia lingüística. Llame al [phone].    We comply with applicable federal civil rights laws and [place] laws. We do not discriminate on the basis of race, color, national origin, age, disability sex, sexual orientation or gender identity.            Thank you!     Thank you for choosing [facility]  for your care. Our goal is always to provide you with excellent " care. Hearing back from our patients is one way we can continue to improve our services. Please take a few minutes to complete the written survey that you may receive in the mail after your visit with us. Thank you!             Your Updated Medication List - Protect others around you: Learn how to safely use, store and throw away your medicines at www.disposemymeds.org.          This list is accurate as of: 9/27/17 11:56 AM.  Always use your most recent med list.                   Brand Name Dispense Instructions for use Diagnosis    prenatal multivitamin plus iron 27-0.8 MG Tabs per tablet      Take 1 tablet by mouth daily        ZOLOFT PO      Take 50 mg by mouth daily

## 2017-10-03 ENCOUNTER — PRENATAL OFFICE VISIT (OUTPATIENT)
Dept: OBGYN | Facility: OTHER | Age: 22
End: 2017-10-03
Attending: ADVANCED PRACTICE MIDWIFE
Payer: COMMERCIAL

## 2017-10-03 VITALS
SYSTOLIC BLOOD PRESSURE: 116 MMHG | HEIGHT: 60 IN | HEART RATE: 86 BPM | BODY MASS INDEX: 31.02 KG/M2 | OXYGEN SATURATION: 98 % | DIASTOLIC BLOOD PRESSURE: 78 MMHG | WEIGHT: 158 LBS

## 2017-10-03 DIAGNOSIS — Z34.03 SUPERVISION OF NORMAL FIRST PREGNANCY IN THIRD TRIMESTER: ICD-10-CM

## 2017-10-03 PROCEDURE — 99207 ZZC PRENATAL VISIT: CPT | Performed by: ADVANCED PRACTICE MIDWIFE

## 2017-10-03 ASSESSMENT — PAIN SCALES - GENERAL: PAINLEVEL: NO PAIN (0)

## 2017-10-03 NOTE — PROGRESS NOTES
Doing well.  Baby active.  Denies bleeding or LOF.  Reports occasional cramping.    Discussed:  When to go to hospital, so [place].    Anticipatory Guidance  care in hospital   Respiratory therapy called for all deliveries  Skin to skin  Immunizations/meds   -Hepatitis B   -Erythromycin   -Vitamin K  Screening   -Hearing   -CCHD   -Bilirubin   -Metabolic  Rooming in  Feeding:  Breast  Car seats  Provider/appointments     JOCY Aguero, CNM

## 2017-10-03 NOTE — PATIENT INSTRUCTIONS
Return to office in one week or as needed.    Thank you for allowing Ryan SANDRA CNM and our OB team to participate in your care.  If you have a scheduling or an appointment question please contact [facility] Unit Coordinator at their direct line [phone].   ALL nursing questions or concerns can be directed to your OB nurse at: [phone] - Laura

## 2017-10-03 NOTE — NURSING NOTE
Chief Complaint   Patient presents with     Prenatal Care     39w1d       Initial /78 (BP Location: Left arm, Patient Position: Chair, Cuff Size: Adult Regular)  Pulse 86  Ht 5' (1.524 m)  Wt 158 lb (71.7 kg)  LMP 01/02/2017  SpO2 98%  BMI 30.86 kg/m2 Estimated body mass index is 30.86 kg/(m^2) as calculated from the following:    Height as of this encounter: 5' (1.524 m).    Weight as of this encounter: 158 lb (71.7 kg).  Medication Reconciliation: alexa Rodriguez

## 2017-10-03 NOTE — MR AVS SNAPSHOT
After Visit Summary   10/3/2017    Valeria Thibodeaux    MRN: [id number]           Patient Information     Date Of Birth          1995        Visit Information        Provider Department      10/3/2017 2:00 PM Ryan Hansen APRN CNM [facility]        Today's Diagnoses     Supervision of normal first pregnancy in third trimester          Care Instructions    Return to office in one week or as needed.    Thank you for allowing Ryan SANDRA CNM and our OB team to participate in your care.  If you have a scheduling or an appointment question please contact [facility] Unit Coordinator at their direct line [phone].   ALL nursing questions or concerns can be directed to your OB nurse at: [phone] - Laura                Follow-ups after your visit        Your next 10 appointments already scheduled     Oct 10, 2017  2:00 PM CDT   (Arrive by 1:45 PM)   ESTABLISHED PRENATAL with JOCY Garcia CNM   [facility] ([facility]    [address] [address] [phone]              Who to contact     If you have questions or need follow up information about today's clinic visit or your schedule please contact [facility] directly at [phone].  Normal or non-critical lab and imaging results will be communicated to you by GreenIQhart, letter or phone within 4 business days after the clinic has received the results. If you do not hear from us within 7 days, please contact the clinic through GreenIQhart or phone. If you have a critical or abnormal lab result, we will notify you by phone as soon as possible.  Submit refill requests through Social Shop or call your pharmacy and they will forward the refill request to us. Please allow 3 business days for your refill to be completed.          Additional Information About Your Visit        Social Shop Information     Social Shop lets you send messages to your  "doctor, view your test results, renew your prescriptions, schedule appointments and more. To sign up, go to [email or web address] . Click on \"Log in\" on the left side of the screen, which will take you to the Welcome page. Then click on \"Sign up Now\" on the right side of the page.     You will be asked to enter the access code listed below, as well as some personal information. Please follow the directions to create your username and password.     Your access code is: [email or web address]  Expires: 2017  4:49 PM     Your access code will  in 90 days. If you need help or a new code, please call your [place] clinic or [phone].        Care EveryWhere ID     This is your Care EveryWhere ID. This could be used by other organizations to access your [place] medical records  [id number]        Your Vitals Were     Pulse Height Last Period Pulse Oximetry BMI (Body Mass Index)       86 5' (1.524 m) 2017 98% 30.86 kg/m2        Blood Pressure from Last 3 Encounters:   10/03/17 116/78   17 122/80   17 112/72    Weight from Last 3 Encounters:   10/03/17 158 lb (71.7 kg)   17 159 lb (72.1 kg)   17 155 lb (70.3 kg)              Today, you had the following     No orders found for display       Primary Care Provider Fax #    Physician No Ref-Primary [phone]       No address on file        Equal Access to Services     [facility]: Hadii rober ku hadasho Soomaali, waaxda luqadaha, qaybta kaalmada adeegyada, waxay nellie arita . So [facility] [phone].    ATENCIÓN: Si habla español, tiene a rodriguez disposición servicios gratuitos de asistencia lingüística. Llame al [phone].    We comply with applicable federal civil rights laws and [place] laws. We do not discriminate on the basis of race, color, national origin, age, disability, sex, sexual orientation, or gender identity.            Thank you!     Thank you for choosing [facility]  for your care. Our " goal is always to provide you with excellent care. Hearing back from our patients is one way we can continue to improve our services. Please take a few minutes to complete the written survey that you may receive in the mail after your visit with us. Thank you!             Your Updated Medication List - Protect others around you: Learn how to safely use, store and throw away your medicines at www.disposemymeds.org.          This list is accurate as of: 10/3/17  4:34 PM.  Always use your most recent med list.                   Brand Name Dispense Instructions for use Diagnosis    prenatal multivitamin plus iron 27-0.8 MG Tabs per tablet      Take 1 tablet by mouth daily        ZOLOFT PO      Take 50 mg by mouth daily

## 2017-10-10 ENCOUNTER — PRENATAL OFFICE VISIT (OUTPATIENT)
Dept: OBGYN | Facility: OTHER | Age: 22
End: 2017-10-10
Attending: ADVANCED PRACTICE MIDWIFE
Payer: COMMERCIAL

## 2017-10-10 VITALS
DIASTOLIC BLOOD PRESSURE: 76 MMHG | SYSTOLIC BLOOD PRESSURE: 118 MMHG | HEIGHT: 60 IN | OXYGEN SATURATION: 98 % | WEIGHT: 159 LBS | BODY MASS INDEX: 31.22 KG/M2 | HEART RATE: 91 BPM

## 2017-10-10 DIAGNOSIS — Z34.03 SUPERVISION OF NORMAL FIRST PREGNANCY IN THIRD TRIMESTER: ICD-10-CM

## 2017-10-10 PROCEDURE — 59425 ANTEPARTUM CARE ONLY: CPT | Performed by: ADVANCED PRACTICE MIDWIFE

## 2017-10-10 PROCEDURE — 76815 OB US LIMITED FETUS(S): CPT | Performed by: ADVANCED PRACTICE MIDWIFE

## 2017-10-10 ASSESSMENT — PAIN SCALES - GENERAL: PAINLEVEL: MILD PAIN (3)

## 2017-10-10 NOTE — PROGRESS NOTES
Doing well.  Baby active.  Denies bleeding or LOF.  Some occasional contractions.    Discussed:  Epidural, labor and birth positions.  Elective IOL options and risks.      Bedside US:  Cephalic.  Amniotic fluid single pocket > 2 cm    Plan:  Scheduled elective IOL on 10/11/17    JOCY Aguero, NANCYM

## 2017-10-10 NOTE — NURSING NOTE
Chief Complaint   Patient presents with     Prenatal Care     40w1d       Initial /76 (BP Location: Left arm, Patient Position: Chair, Cuff Size: Adult Regular)  Pulse 91  Ht 5' (1.524 m)  Wt 159 lb (72.1 kg)  LMP 01/02/2017  SpO2 98%  BMI 31.05 kg/m2 Estimated body mass index is 31.05 kg/(m^2) as calculated from the following:    Height as of this encounter: 5' (1.524 m).    Weight as of this encounter: 159 lb (72.1 kg).  Medication Reconciliation: alexa Rodriguez

## 2017-10-10 NOTE — MR AVS SNAPSHOT
"              After Visit Summary   10/10/2017    Valeria Thibodeaux    MRN: [id number]           Patient Information     Date Of Birth          1995        Visit Information        Provider Department      10/10/2017 2:00 PM Ryan Hansen APRN CNM [facility]        Today's Diagnoses     Supervision of normal first pregnancy in third trimester          Care Instructions    Return to office as scheduled and as needed.    Thank you for allowing Ryan SANDRA, BEST and our OB team to participate in your care.  If you have a scheduling or an appointment question please contact [facility] at their direct line [phone].   ALL nursing questions or concerns can be directed to your OB nurse at: [phone] - leah                  Follow-ups after your visit        Who to contact     If you have questions or need follow up information about today's clinic visit or your schedule please contact [facility] directly at [phone].  Normal or non-critical lab and imaging results will be communicated to you by MyChart, letter or phone within 4 business days after the clinic has received the results. If you do not hear from us within 7 days, please contact the clinic through [employer] or phone. If you have a critical or abnormal lab result, we will notify you by phone as soon as possible.  Submit refill requests through [employer] or call your pharmacy and they will forward the refill request to us. Please allow 3 business days for your refill to be completed.          Additional Information About Your Visit        [employer] Information     [employer] lets you send messages to your doctor, view your test results, renew your prescriptions, schedule appointments and more. To sign up, go to [email or web address] . Click on \"Log in\" on the left side of the screen, which will take you to the Welcome page. Then click on \"Sign up Now\" on the right side of the page.     You will " be asked to enter the access code listed below, as well as some personal information. Please follow the directions to create your username and password.     Your access code is: [email or web address]  Expires: 2017  4:49 PM     Your access code will  in 90 days. If you need help or a new code, please call your [place] clinic or [phone].        Care EveryWhere ID     This is your Care EveryWhere ID. This could be used by other organizations to access your [place] medical records  [id number]        Your Vitals Were     Pulse Height Last Period Pulse Oximetry BMI (Body Mass Index)       91 5' (1.524 m) 2017 98% 31.05 kg/m2        Blood Pressure from Last 3 Encounters:   10/10/17 118/76   10/03/17 116/78   17 122/80    Weight from Last 3 Encounters:   10/10/17 159 lb (72.1 kg)   10/03/17 158 lb (71.7 kg)   17 159 lb (72.1 kg)              We Performed the Following     US OB LIMITED, 1 OR MORE FETUSES        Primary Care Provider    Physician No Ref-Primary       NO REF-PRIMARY PHYSICIAN        Equal Access to Services     [facility]: Hadii rober ku erasmoo Sosumit, waaxda luqadaha, qaybta kaalmada adeguerdada, liz arita . So [facility] [phone].    ATENCIÓN: Si habla español, tiene a rodriguez disposición servicios gratuitos de asistencia lingüística. Llame al [phone].    We comply with applicable federal civil rights laws and [place] laws. We do not discriminate on the basis of race, color, national origin, age, disability, sex, sexual orientation, or gender identity.            Thank you!     Thank you for choosing [facility]  for your care. Our goal is always to provide you with excellent care. Hearing back from our patients is one way we can continue to improve our services. Please take a few minutes to complete the written survey that you may receive in the mail after your visit with us. Thank you!             Your Updated Medication List -  Protect others around you: Learn how to safely use, store and throw away your medicines at www.disposemymeds.org.          This list is accurate as of: 10/10/17  4:36 PM.  Always use your most recent med list.                   Brand Name Dispense Instructions for use Diagnosis    prenatal multivitamin plus iron 27-0.8 MG Tabs per tablet      Take 1 tablet by mouth daily        ZOLOFT PO      Take 50 mg by mouth daily

## 2017-10-10 NOTE — PATIENT INSTRUCTIONS
Return to office as scheduled and as needed.    Thank you for allowing Ryan SANDRA CNM and our OB team to participate in your care.  If you have a scheduling or an appointment question please contact [facility] Unit Coordinator at their direct line [phone].   ALL nursing questions or concerns can be directed to your OB nurse at: [phone] - Laura

## 2017-10-11 ENCOUNTER — TELEPHONE (OUTPATIENT)
Dept: OBGYN | Facility: OTHER | Age: 22
End: 2017-10-11

## 2017-10-11 NOTE — TELEPHONE ENCOUNTER
Pt called to reschedule IOL.  Pt denies bleeding or LOF.  Reports occasional cramping and loss of mucous plug.  Instructed to do kick counts every day if not feeling fetal movement.  Go to hospital if contractions increasing in intensity/frequency, leakage of fluid, bleeding, or decreased fetal movement.  IOL scheduled for 10/16/17.

## 2017-10-12 ENCOUNTER — HOSPITAL ENCOUNTER (OUTPATIENT)
Facility: HOSPITAL | Age: 22
Discharge: HOME | End: 2017-10-12
Attending: ADVANCED PRACTICE MIDWIFE | Admitting: ADVANCED PRACTICE MIDWIFE
Payer: COMMERCIAL

## 2017-10-12 ENCOUNTER — TELEPHONE (OUTPATIENT)
Dept: OBGYN | Facility: OTHER | Age: 22
End: 2017-10-12

## 2017-10-12 VITALS
SYSTOLIC BLOOD PRESSURE: 109 MMHG | RESPIRATION RATE: 16 BRPM | TEMPERATURE: 98.5 F | WEIGHT: 159 LBS | HEIGHT: 60 IN | HEART RATE: 103 BPM | BODY MASS INDEX: 31.22 KG/M2 | DIASTOLIC BLOOD PRESSURE: 70 MMHG

## 2017-10-12 LAB — A1 MICROGLOB PLACENTAL VAG QL: NEGATIVE

## 2017-10-12 PROCEDURE — 84112 EVAL AMNIOTIC FLUID PROTEIN: CPT | Performed by: ADVANCED PRACTICE MIDWIFE

## 2017-10-12 PROCEDURE — 59025 FETAL NON-STRESS TEST: CPT

## 2017-10-12 PROCEDURE — 59025 FETAL NON-STRESS TEST: CPT | Mod: 26 | Performed by: ADVANCED PRACTICE MIDWIFE

## 2017-10-12 PROCEDURE — 99213 OFFICE O/P EST LOW 20 MIN: CPT | Mod: 25

## 2017-10-12 NOTE — TELEPHONE ENCOUNTER
12:14 PM    Reason for Call: Phone Call    Description: Pt called and stated she woke up with the front of her shorts really wet. She is having mild contractions but nothing too major. Would like to discuss with nurse. Please call her back at [phone]    Was an appointment offered for this call? No  If yes : Appointment type              Date    Preferred method for responding to this message: Telephone Call  What is your phone number ?    If we cannot reach you directly, may we leave a detailed response at the number you provided? Yes    Can this message wait until your PCP/provider returns, if available today? Not applicable    Leslie Hoffman

## 2017-10-12 NOTE — IP AVS SNAPSHOT
[facility]    [address] [address]    Phone:  [phone]    Fax:  [phone]                                       After Visit Summary   10/12/2017    Valeria Thibodeaux    MRN: [id number]           After Visit Summary Signature Page     I have received my discharge instructions, and my questions have been answered. I have discussed any challenges I see with this plan with the nurse or doctor.    ..........................................................................................................................................  Patient/Patient Representative Signature      ..........................................................................................................................................  Patient Representative Print Name and Relationship to Patient    ..................................................               ................................................  Date                                            Time    ..........................................................................................................................................  Reviewed by Signature/Title    ...................................................              ..............................................  Date                                                            Time

## 2017-10-12 NOTE — PLAN OF CARE
Amnisure is negative, reactive NST. Pt instructed on kick counts if baby isnt moving and when to call and come in, if labor starts.  IF not pt will be back on Monday at 0730 for induction. Pt discharged to home at 1745

## 2017-10-12 NOTE — IP AVS SNAPSHOT
MRN:[id number]                      After Visit Summary   10/12/2017    Valeria Thibodeaux    MRN: [id number]           Thank you!     Thank you for choosing [facility] for your care. Our goal is always to provide you with excellent care. Hearing back from our patients is one way we can continue to improve our services. Please take a few minutes to complete the written survey that you may receive in the mail after you visit with us. Thank you!        Patient Information     Date Of Birth          1995        About your hospital stay     You were admitted on:  October 12, 2017 You last received care in the:  [facility]    You were discharged on:  October 12, 2017       Who to Call     For medical emergencies, please call 911.  For non-urgent questions about your medical care, please call your primary care provider or clinic, None          Attending Provider     Provider Specialty    Ryan Hansen APRN CNM [facility]       Primary Care Provider    Physician No Ref-Primary      Further instructions from your care team       Discharge Instructions for Undelivered Patients    Diet:  * Drink 8 to 12 glasses of liquids (milk, juice, water) every day  * You may eat meals and snacks.    Activity:  * Count fetal kicks every day.  * Call your doctor if your baby is moving less than usual.    Call your provider if you notice:  * Swelling in your face or increased swelling in your hands or legs.  * Headaches that are not relieved by Tylenol (acetaminophen).  * Changes in your vision (blurring; seeing spots or stars).  * Nausea (sick to your stomach) and vomiting (throwing up).  * Weight gain of 5 pounds per week.  * Heartburn that doesn't go away.  * Signs of bladder infection: Pain when you urinate (use the toilet), needing to go more often or more urgently.  * The bag of glynn (membrane) breaks, or you notice leaking in your underwear.  * Bright red blood in your underwear.  * Abdominal (lower  "belly) or stomach pain.  * For first baby: Contractions (tightenings) less than 5 minutes apart for one hour or more.  * Second (plus) baby: Contractions (tightenings) less than 10 minutes apart and getting stronger.  * Increase or change in vaginal discharge (note the color and amount).    ***    [facility]: [phone]        Pending Results     No orders found from 10/10/2017 to 10/13/2017.            Admission Information     Date & Time Provider Department Dept. Phone    10/12/2017 Ryan Hansen, APRN CNM [facility] [phone]      Your Vitals Were     Blood Pressure Pulse Temperature Respirations Height Weight    109/70 103 98.5  F (36.9  C) (Oral) 16 1.524 m (5') 72.1 kg (159 lb)    Last Period BMI (Body Mass Index)                2017 31.05 kg/m2          [employer] Information     [employer] lets you send messages to your doctor, view your test results, renew your prescriptions, schedule appointments and more. To sign up, go to [email or web address] . Click on \"Log in\" on the left side of the screen, which will take you to the Welcome page. Then click on \"Sign up Now\" on the right side of the page.     You will be asked to enter the access code listed below, as well as some personal information. Please follow the directions to create your username and password.     Your access code is: [email or web address]  Expires: 2017  4:49 PM     Your access code will  in 90 days. If you need help or a new code, please call your [place] clinic or [phone].        Care EveryWhere ID     This is your Care EveryWhere ID. This could be used by other organizations to access your [place] medical records  [id number]        Equal Access to Services     [facility] : Lindsay Conner, fabiola stauffer, qarosy kaliz valera. So [facility] [phone].    ATENCIÓN: Si vijay cummins, tiene a rodriguez disposición servicios gratuitos " de asistencia lingüística. Caleb costello [phone].    We comply with applicable federal civil rights laws and [place] laws. We do not discriminate on the basis of race, color, national origin, age, disability, sex, sexual orientation, or gender identity.               Review of your medicines      UNREVIEWED medicines. Ask your doctor about these medicines        Dose / Directions    prenatal multivitamin plus iron 27-0.8 MG Tabs per tablet        Dose:  1 tablet   Take 1 tablet by mouth daily   Refills:  0       ZOLOFT PO        Dose:  50 mg   Take 50 mg by mouth daily   Refills:  0                Protect others around you: Learn how to safely use, store and throw away your medicines at www.disposemymeds.org.             Medication List: This is a list of all your medications and when to take them. Check marks below indicate your daily home schedule. Keep this list as a reference.      Medications           Morning Afternoon Evening Bedtime As Needed    prenatal multivitamin plus iron 27-0.8 MG Tabs per tablet   Take 1 tablet by mouth daily                                ZOLOFT PO   Take 50 mg by mouth daily                                          More Information        Labor: Preparing for the Hospital    During the final weeks of your pregnancy, you may have irregular contractions. You may feel a drop in your baby s position. And the profile of your stomach may look a little different. Because due dates are not exact, have your bag packed and ready for the hospital.  Packing for the hospital  Here are some items you may want to have ready for the hospital:    A watch or clock with a second hand    Insurance card and identification    Nursing bra, nursing pads, and maternity underwear    Toiletries    Something to entertain yourself, like books or a handheld computer    Heavy socks or slippers and a robe    Clips for your hair, a brush, and lip balm    An MP3 or other music player    A camera or video camera  and     Important phone numbers or email addresses    Going-home outfits for you and your baby    A car seat to take your baby home in  Leaving for the hospital  Follow the instructions you ve been given on when to leave for the hospital. You may be told to call your healthcare provider when it becomes hard to walk or talk during contractions or if your amniotic sac breaks. If your partner makes the phone call for you, be nearby. That way, your healthcare provider can speak to you directly. Many women are told to go to the hospital after an hour of contractions that come 3 to 5 minutes apart. Leave sooner if the hospital is not nearby or is hard to get to.  Date Last Reviewed: 8/16/2015 2000-2017 [employer]. [address] [place] [address], [place] [address]. All rights reserved. This information is not intended as a substitute for professional medical care. Always follow your healthcare professional's instructions.

## 2017-10-12 NOTE — TELEPHONE ENCOUNTER
Patient reports that she woke up at 1030am and her shorts were soaking wet. She reports that the fluid doesn't seem to be continuing to leak. States baby is moving as normal. Reports she has been theo all morning; unable to give time frame in which contractions are occurring just states they are close. Recommended patient to come in for assessment to rule out arom. Patient agrees and will arrive on unit in 20-30 minutes.

## 2017-10-12 NOTE — DISCHARGE INSTRUCTIONS

## 2017-10-12 NOTE — PLAN OF CARE
Pt had a called a couple hours ago and thought her membranes ruptured, was told to come right in.  Here now. EFM on and will do amnisure

## 2017-10-13 NOTE — PROGRESS NOTES
Fetal Non-Stress Test Results    NST Ordered By: Ryan Hansen       NST Start & Stop Times                                  NST Results  Fetus A   Baseline Rate: 140  Accelerations: Present  Decelerations: Variable decelerations  Interpretation: reactive

## 2017-10-14 ENCOUNTER — HOSPITAL ENCOUNTER (INPATIENT)
Facility: HOSPITAL | Age: 22
LOS: 3 days | Discharge: HOME | End: 2017-10-17
Attending: OBSTETRICS & GYNECOLOGY | Admitting: OBSTETRICS & GYNECOLOGY
Payer: COMMERCIAL

## 2017-10-14 ENCOUNTER — ANESTHESIA EVENT (OUTPATIENT)
Dept: SURGERY | Facility: HOSPITAL | Age: 22
End: 2017-10-14
Payer: COMMERCIAL

## 2017-10-14 ENCOUNTER — ANESTHESIA (OUTPATIENT)
Dept: SURGERY | Facility: HOSPITAL | Age: 22
End: 2017-10-14
Payer: COMMERCIAL

## 2017-10-14 PROBLEM — Z37.9 NORMAL LABOR: Status: ACTIVE | Noted: 2017-10-14

## 2017-10-14 LAB
ABO + RH BLD: NORMAL
ABO + RH BLD: NORMAL
ALBUMIN SERPL-MCNC: 2.1 G/DL (ref 3.4–5)
ALBUMIN UR-MCNC: 300 MG/DL
ALBUMIN UR-MCNC: 300 MG/DL
ALP SERPL-CCNC: 159 U/L (ref 40–150)
ALT SERPL W P-5'-P-CCNC: 11 U/L (ref 0–50)
AMPHETAMINES UR QL SCN: NEGATIVE
ANION GAP SERPL CALCULATED.3IONS-SCNC: 10 MMOL/L (ref 3–14)
APPEARANCE UR: CLEAR
APPEARANCE UR: CLEAR
AST SERPL W P-5'-P-CCNC: 16 U/L (ref 0–45)
BACTERIA #/AREA URNS HPF: ABNORMAL /HPF
BACTERIA #/AREA URNS HPF: ABNORMAL /HPF
BARBITURATES UR QL: NEGATIVE
BASOPHILS # BLD AUTO: 0 10E9/L (ref 0–0.2)
BASOPHILS NFR BLD AUTO: 0.2 %
BENZODIAZ UR QL: NEGATIVE
BILIRUB SERPL-MCNC: 0.3 MG/DL (ref 0.2–1.3)
BILIRUB UR QL STRIP: NEGATIVE
BILIRUB UR QL STRIP: NEGATIVE
BLD GP AB SCN SERPL QL: NORMAL
BLOOD BANK CMNT PATIENT-IMP: NORMAL
BUN SERPL-MCNC: 20 MG/DL (ref 7–30)
CALCIUM SERPL-MCNC: 7.9 MG/DL (ref 8.5–10.1)
CANNABINOIDS UR QL SCN: NEGATIVE
CHLORIDE SERPL-SCNC: 102 MMOL/L (ref 94–109)
CO2 SERPL-SCNC: 22 MMOL/L (ref 20–32)
COCAINE UR QL: NEGATIVE
COLOR UR AUTO: YELLOW
COLOR UR AUTO: YELLOW
CREAT SERPL-MCNC: 1.11 MG/DL (ref 0.52–1.04)
DIFFERENTIAL METHOD BLD: ABNORMAL
EOSINOPHIL # BLD AUTO: 0.1 10E9/L (ref 0–0.7)
EOSINOPHIL NFR BLD AUTO: 0.4 %
ERYTHROCYTE [DISTWIDTH] IN BLOOD BY AUTOMATED COUNT: 12.4 % (ref 10–15)
ERYTHROCYTE [DISTWIDTH] IN BLOOD BY AUTOMATED COUNT: 12.7 % (ref 10–15)
GFR SERPL CREATININE-BSD FRML MDRD: 60 ML/MIN/1.7M2
GLUCOSE SERPL-MCNC: 66 MG/DL (ref 70–99)
GLUCOSE UR STRIP-MCNC: NEGATIVE MG/DL
GLUCOSE UR STRIP-MCNC: NEGATIVE MG/DL
HCT VFR BLD AUTO: 28.8 % (ref 35–47)
HCT VFR BLD AUTO: 32.4 % (ref 35–47)
HGB BLD-MCNC: 10.8 G/DL (ref 11.7–15.7)
HGB BLD-MCNC: 9.8 G/DL (ref 11.7–15.7)
HGB UR QL STRIP: ABNORMAL
HGB UR QL STRIP: NEGATIVE
IMM GRANULOCYTES # BLD: 0.1 10E9/L (ref 0–0.4)
IMM GRANULOCYTES NFR BLD: 0.9 %
KETONES UR STRIP-MCNC: NEGATIVE MG/DL
KETONES UR STRIP-MCNC: NEGATIVE MG/DL
LEUKOCYTE ESTERASE UR QL STRIP: NEGATIVE
LEUKOCYTE ESTERASE UR QL STRIP: NEGATIVE
LYMPHOCYTES # BLD AUTO: 1.6 10E9/L (ref 0.8–5.3)
LYMPHOCYTES NFR BLD AUTO: 12.3 %
MCH RBC QN AUTO: 28.8 PG (ref 26.5–33)
MCH RBC QN AUTO: 29.2 PG (ref 26.5–33)
MCHC RBC AUTO-ENTMCNC: 33.3 G/DL (ref 31.5–36.5)
MCHC RBC AUTO-ENTMCNC: 34 G/DL (ref 31.5–36.5)
MCV RBC AUTO: 86 FL (ref 78–100)
MCV RBC AUTO: 86 FL (ref 78–100)
METHADONE UR QL SCN: NEGATIVE
MONOCYTES # BLD AUTO: 0.8 10E9/L (ref 0–1.3)
MONOCYTES NFR BLD AUTO: 5.8 %
MUCOUS THREADS #/AREA URNS LPF: PRESENT /LPF
MUCOUS THREADS #/AREA URNS LPF: PRESENT /LPF
NEUTROPHILS # BLD AUTO: 10.7 10E9/L (ref 1.6–8.3)
NEUTROPHILS NFR BLD AUTO: 80.4 %
NITRATE UR QL: NEGATIVE
NITRATE UR QL: NEGATIVE
NRBC # BLD AUTO: 0 10*3/UL
NRBC BLD AUTO-RTO: 0 /100
OPIATES UR QL SCN: NEGATIVE
PCP UR QL SCN: NEGATIVE
PH UR STRIP: 6.5 PH (ref 4.7–8)
PH UR STRIP: 6.5 PH (ref 4.7–8)
PLATELET # BLD AUTO: 176 10E9/L (ref 150–450)
PLATELET # BLD AUTO: 221 10E9/L (ref 150–450)
POTASSIUM SERPL-SCNC: 4.5 MMOL/L (ref 3.4–5.3)
PROT SERPL-MCNC: 5.9 G/DL (ref 6.8–8.8)
RBC # BLD AUTO: 3.36 10E12/L (ref 3.8–5.2)
RBC # BLD AUTO: 3.75 10E12/L (ref 3.8–5.2)
RBC #/AREA URNS AUTO: 1 /HPF (ref 0–2)
RBC #/AREA URNS AUTO: 104 /HPF (ref 0–2)
SODIUM SERPL-SCNC: 134 MMOL/L (ref 133–144)
SOURCE: ABNORMAL
SOURCE: ABNORMAL
SP GR UR STRIP: 1.02 (ref 1–1.03)
SP GR UR STRIP: 1.02 (ref 1–1.03)
SPECIMEN EXP DATE BLD: NORMAL
SQUAMOUS #/AREA URNS AUTO: 2 /HPF (ref 0–1)
SQUAMOUS #/AREA URNS AUTO: 2 /HPF (ref 0–1)
UROBILINOGEN UR STRIP-MCNC: NORMAL MG/DL (ref 0–2)
UROBILINOGEN UR STRIP-MCNC: NORMAL MG/DL (ref 0–2)
WBC # BLD AUTO: 12 10E9/L (ref 4–11)
WBC # BLD AUTO: 13.3 10E9/L (ref 4–11)
WBC #/AREA URNS AUTO: 13 /HPF (ref 0–2)
WBC #/AREA URNS AUTO: <1 /HPF (ref 0–2)

## 2017-10-14 PROCEDURE — 86900 BLOOD TYPING SEROLOGIC ABO: CPT | Performed by: OBSTETRICS & GYNECOLOGY

## 2017-10-14 PROCEDURE — 86901 BLOOD TYPING SEROLOGIC RH(D): CPT | Performed by: OBSTETRICS & GYNECOLOGY

## 2017-10-14 PROCEDURE — 85027 COMPLETE CBC AUTOMATED: CPT | Performed by: OBSTETRICS & GYNECOLOGY

## 2017-10-14 PROCEDURE — 25000132 ZZH RX MED GY IP 250 OP 250 PS 637: Performed by: OBSTETRICS & GYNECOLOGY

## 2017-10-14 PROCEDURE — 36415 COLL VENOUS BLD VENIPUNCTURE: CPT | Performed by: OBSTETRICS & GYNECOLOGY

## 2017-10-14 PROCEDURE — 25000125 ZZHC RX 250: Performed by: NURSE ANESTHETIST, CERTIFIED REGISTERED

## 2017-10-14 PROCEDURE — 12000027 ZZH R&B OB

## 2017-10-14 PROCEDURE — 85025 COMPLETE CBC W/AUTO DIFF WBC: CPT | Performed by: OBSTETRICS & GYNECOLOGY

## 2017-10-14 PROCEDURE — 25000128 H RX IP 250 OP 636: Performed by: OBSTETRICS & GYNECOLOGY

## 2017-10-14 PROCEDURE — 81001 URINALYSIS AUTO W/SCOPE: CPT | Performed by: OBSTETRICS & GYNECOLOGY

## 2017-10-14 PROCEDURE — S0020 INJECTION, BUPIVICAINE HYDRO: HCPCS | Performed by: NURSE ANESTHETIST, CERTIFIED REGISTERED

## 2017-10-14 PROCEDURE — 00HU33Z INSERTION OF INFUSION DEVICE INTO SPINAL CANAL, PERCUTANEOUS APPROACH: ICD-10-PCS | Performed by: NURSE ANESTHETIST, CERTIFIED REGISTERED

## 2017-10-14 PROCEDURE — 25000128 H RX IP 250 OP 636

## 2017-10-14 PROCEDURE — 25000125 ZZHC RX 250: Performed by: OBSTETRICS & GYNECOLOGY

## 2017-10-14 PROCEDURE — 87086 URINE CULTURE/COLONY COUNT: CPT | Performed by: OBSTETRICS & GYNECOLOGY

## 2017-10-14 PROCEDURE — 80053 COMPREHEN METABOLIC PANEL: CPT | Performed by: OBSTETRICS & GYNECOLOGY

## 2017-10-14 PROCEDURE — 86850 RBC ANTIBODY SCREEN: CPT | Performed by: OBSTETRICS & GYNECOLOGY

## 2017-10-14 PROCEDURE — 25000128 H RX IP 250 OP 636: Performed by: NURSE ANESTHETIST, CERTIFIED REGISTERED

## 2017-10-14 PROCEDURE — 80307 DRUG TEST PRSMV CHEM ANLYZR: CPT | Performed by: OBSTETRICS & GYNECOLOGY

## 2017-10-14 PROCEDURE — 3E0R3BZ INTRODUCTION OF ANESTHETIC AGENT INTO SPINAL CANAL, PERCUTANEOUS APPROACH: ICD-10-PCS | Performed by: NURSE ANESTHETIST, CERTIFIED REGISTERED

## 2017-10-14 RX ORDER — NALOXONE HYDROCHLORIDE 0.4 MG/ML
.1-.4 INJECTION, SOLUTION INTRAMUSCULAR; INTRAVENOUS; SUBCUTANEOUS
Status: DISCONTINUED | OUTPATIENT
Start: 2017-10-14 | End: 2017-10-15

## 2017-10-14 RX ORDER — MAGNESIUM SULFATE HEPTAHYDRATE 40 MG/ML
4 INJECTION, SOLUTION INTRAVENOUS
Status: DISCONTINUED | OUTPATIENT
Start: 2017-10-14 | End: 2017-10-15

## 2017-10-14 RX ORDER — MAGNESIUM SULFATE IN WATER 40 MG/ML
2 INJECTION, SOLUTION INTRAVENOUS CONTINUOUS
Status: DISCONTINUED | OUTPATIENT
Start: 2017-10-14 | End: 2017-10-15

## 2017-10-14 RX ORDER — EPHEDRINE SULFATE 50 MG/ML
INJECTION, SOLUTION INTRAMUSCULAR; INTRAVENOUS; SUBCUTANEOUS
Status: DISCONTINUED
Start: 2017-10-14 | End: 2017-10-15 | Stop reason: HOSPADM

## 2017-10-14 RX ORDER — FENTANYL CITRATE 50 UG/ML
50-100 INJECTION, SOLUTION INTRAMUSCULAR; INTRAVENOUS
Status: DISCONTINUED | OUTPATIENT
Start: 2017-10-14 | End: 2017-10-14

## 2017-10-14 RX ORDER — OXYTOCIN 10 [USP'U]/ML
10 INJECTION, SOLUTION INTRAMUSCULAR; INTRAVENOUS
Status: DISCONTINUED | OUTPATIENT
Start: 2017-10-14 | End: 2017-10-15

## 2017-10-14 RX ORDER — LIDOCAINE 40 MG/G
CREAM TOPICAL
Status: DISCONTINUED | OUTPATIENT
Start: 2017-10-14 | End: 2017-10-15

## 2017-10-14 RX ORDER — BUPIVACAINE HYDROCHLORIDE 2.5 MG/ML
INJECTION, SOLUTION EPIDURAL; INFILTRATION; INTRACAUDAL PRN
Status: DISCONTINUED | OUTPATIENT
Start: 2017-10-14 | End: 2017-10-15

## 2017-10-14 RX ORDER — OXYCODONE AND ACETAMINOPHEN 5; 325 MG/1; MG/1
1 TABLET ORAL
Status: DISCONTINUED | OUTPATIENT
Start: 2017-10-14 | End: 2017-10-15

## 2017-10-14 RX ORDER — CARBOPROST TROMETHAMINE 250 UG/ML
250 INJECTION, SOLUTION INTRAMUSCULAR
Status: DISCONTINUED | OUTPATIENT
Start: 2017-10-14 | End: 2017-10-15

## 2017-10-14 RX ORDER — IBUPROFEN 800 MG/1
800 TABLET, FILM COATED ORAL
Status: DISCONTINUED | OUTPATIENT
Start: 2017-10-14 | End: 2017-10-15

## 2017-10-14 RX ORDER — ONDANSETRON 2 MG/ML
4 INJECTION INTRAMUSCULAR; INTRAVENOUS EVERY 6 HOURS PRN
Status: DISCONTINUED | OUTPATIENT
Start: 2017-10-14 | End: 2017-10-14

## 2017-10-14 RX ORDER — MAGNESIUM SULFATE HEPTAHYDRATE 500 MG/ML
4 INJECTION, SOLUTION INTRAMUSCULAR; INTRAVENOUS
Status: DISCONTINUED | OUTPATIENT
Start: 2017-10-14 | End: 2017-10-14

## 2017-10-14 RX ORDER — NALBUPHINE HYDROCHLORIDE 20 MG/ML
2.5-5 INJECTION, SOLUTION INTRAMUSCULAR; INTRAVENOUS; SUBCUTANEOUS EVERY 4 HOURS PRN
Status: DISCONTINUED | OUTPATIENT
Start: 2017-10-14 | End: 2017-10-15

## 2017-10-14 RX ORDER — SODIUM CHLORIDE, SODIUM LACTATE, POTASSIUM CHLORIDE, CALCIUM CHLORIDE 600; 310; 30; 20 MG/100ML; MG/100ML; MG/100ML; MG/100ML
10-125 INJECTION, SOLUTION INTRAVENOUS CONTINUOUS
Status: DISCONTINUED | OUTPATIENT
Start: 2017-10-14 | End: 2017-10-15

## 2017-10-14 RX ORDER — EPHEDRINE SULFATE 50 MG/ML
5 INJECTION, SOLUTION INTRAMUSCULAR; INTRAVENOUS; SUBCUTANEOUS
Status: DISCONTINUED | OUTPATIENT
Start: 2017-10-14 | End: 2017-10-15

## 2017-10-14 RX ORDER — SODIUM CHLORIDE, SODIUM LACTATE, POTASSIUM CHLORIDE, CALCIUM CHLORIDE 600; 310; 30; 20 MG/100ML; MG/100ML; MG/100ML; MG/100ML
INJECTION, SOLUTION INTRAVENOUS CONTINUOUS
Status: DISCONTINUED | OUTPATIENT
Start: 2017-10-14 | End: 2017-10-15

## 2017-10-14 RX ORDER — FENTANYL CITRATE 50 UG/ML
100 INJECTION, SOLUTION INTRAMUSCULAR; INTRAVENOUS ONCE
Status: COMPLETED | OUTPATIENT
Start: 2017-10-14 | End: 2017-10-14

## 2017-10-14 RX ORDER — METHYLERGONOVINE MALEATE 0.2 MG/ML
200 INJECTION INTRAVENOUS
Status: DISCONTINUED | OUTPATIENT
Start: 2017-10-14 | End: 2017-10-15

## 2017-10-14 RX ORDER — LABETALOL HYDROCHLORIDE 5 MG/ML
20 INJECTION, SOLUTION INTRAVENOUS EVERY 10 MIN PRN
Status: DISCONTINUED | OUTPATIENT
Start: 2017-10-14 | End: 2017-10-15

## 2017-10-14 RX ORDER — CALCIUM GLUCONATE 94 MG/ML
1 INJECTION, SOLUTION INTRAVENOUS
Status: DISCONTINUED | OUTPATIENT
Start: 2017-10-14 | End: 2017-10-15

## 2017-10-14 RX ORDER — OXYTOCIN/0.9 % SODIUM CHLORIDE 30/500 ML
1-24 PLASTIC BAG, INJECTION (ML) INTRAVENOUS CONTINUOUS
Status: DISCONTINUED | OUTPATIENT
Start: 2017-10-14 | End: 2017-10-15

## 2017-10-14 RX ORDER — LABETALOL HYDROCHLORIDE 5 MG/ML
40 INJECTION, SOLUTION INTRAVENOUS EVERY 10 MIN PRN
Status: DISCONTINUED | OUTPATIENT
Start: 2017-10-14 | End: 2017-10-15

## 2017-10-14 RX ORDER — OXYTOCIN/0.9 % SODIUM CHLORIDE 30/500 ML
100-340 PLASTIC BAG, INJECTION (ML) INTRAVENOUS CONTINUOUS PRN
Status: DISCONTINUED | OUTPATIENT
Start: 2017-10-14 | End: 2017-10-15

## 2017-10-14 RX ORDER — MAGNESIUM SULFATE HEPTAHYDRATE 40 MG/ML
2 INJECTION, SOLUTION INTRAVENOUS
Status: DISCONTINUED | OUTPATIENT
Start: 2017-10-14 | End: 2017-10-15

## 2017-10-14 RX ORDER — NIFEDIPINE 10 MG/1
10 CAPSULE ORAL
Status: DISCONTINUED | OUTPATIENT
Start: 2017-10-14 | End: 2017-10-15

## 2017-10-14 RX ORDER — LORAZEPAM 2 MG/ML
2 INJECTION INTRAMUSCULAR
Status: DISCONTINUED | OUTPATIENT
Start: 2017-10-14 | End: 2017-10-15

## 2017-10-14 RX ORDER — ONDANSETRON 4 MG/1
4 TABLET, ORALLY DISINTEGRATING ORAL EVERY 6 HOURS PRN
Status: DISCONTINUED | OUTPATIENT
Start: 2017-10-14 | End: 2017-10-15

## 2017-10-14 RX ORDER — LABETALOL HYDROCHLORIDE 5 MG/ML
80 INJECTION, SOLUTION INTRAVENOUS EVERY 10 MIN PRN
Status: DISCONTINUED | OUTPATIENT
Start: 2017-10-14 | End: 2017-10-15

## 2017-10-14 RX ORDER — MAGNESIUM SULFATE HEPTAHYDRATE 40 MG/ML
4 INJECTION, SOLUTION INTRAVENOUS ONCE
Status: DISCONTINUED | OUTPATIENT
Start: 2017-10-14 | End: 2017-10-14

## 2017-10-14 RX ORDER — ACETAMINOPHEN 325 MG/1
650 TABLET ORAL EVERY 4 HOURS PRN
Status: DISCONTINUED | OUTPATIENT
Start: 2017-10-14 | End: 2017-10-15

## 2017-10-14 RX ORDER — BUPIVACAINE HYDROCHLORIDE 2.5 MG/ML
INJECTION, SOLUTION EPIDURAL; INFILTRATION; INTRACAUDAL
Status: COMPLETED
Start: 2017-10-14 | End: 2017-10-14

## 2017-10-14 RX ORDER — FENTANYL CITRATE 50 UG/ML
INJECTION, SOLUTION INTRAMUSCULAR; INTRAVENOUS
Status: COMPLETED
Start: 2017-10-14 | End: 2017-10-14

## 2017-10-14 RX ORDER — ONDANSETRON 2 MG/ML
4 INJECTION INTRAMUSCULAR; INTRAVENOUS EVERY 6 HOURS PRN
Status: DISCONTINUED | OUTPATIENT
Start: 2017-10-14 | End: 2017-10-15

## 2017-10-14 RX ADMIN — FENTANYL CITRATE 100 MCG: 50 INJECTION, SOLUTION INTRAMUSCULAR; INTRAVENOUS at 07:45

## 2017-10-14 RX ADMIN — OXYTOCIN-SODIUM CHLORIDE 0.9% IV SOLN 30 UNIT/500ML 4 MILLI-UNITS/MIN: 30-0.9/5 SOLUTION at 22:20

## 2017-10-14 RX ADMIN — OXYTOCIN-SODIUM CHLORIDE 0.9% IV SOLN 30 UNIT/500ML 2 MILLI-UNITS/MIN: 30-0.9/5 SOLUTION at 23:46

## 2017-10-14 RX ADMIN — ACETAMINOPHEN 650 MG: 325 TABLET, FILM COATED ORAL at 22:11

## 2017-10-14 RX ADMIN — BUPIVACAINE HYDROCHLORIDE 3 ML: 2.5 INJECTION, SOLUTION EPIDURAL; INFILTRATION; INTRACAUDAL at 10:07

## 2017-10-14 RX ADMIN — FENTANYL CITRATE 100 MCG: 50 INJECTION, SOLUTION INTRAMUSCULAR; INTRAVENOUS at 10:07

## 2017-10-14 RX ADMIN — FENTANYL CITRATE 100 MCG: 50 INJECTION, SOLUTION INTRAMUSCULAR; INTRAVENOUS at 08:47

## 2017-10-14 RX ADMIN — SODIUM CHLORIDE, POTASSIUM CHLORIDE, SODIUM LACTATE AND CALCIUM CHLORIDE 500 ML: 600; 310; 30; 20 INJECTION, SOLUTION INTRAVENOUS at 14:56

## 2017-10-14 RX ADMIN — OXYTOCIN-SODIUM CHLORIDE 0.9% IV SOLN 30 UNIT/500ML 3 MILLI-UNITS/MIN: 30-0.9/5 SOLUTION at 21:01

## 2017-10-14 RX ADMIN — OXYTOCIN-SODIUM CHLORIDE 0.9% IV SOLN 30 UNIT/500ML 2 MILLI-UNITS/MIN: 30-0.9/5 SOLUTION at 14:07

## 2017-10-14 RX ADMIN — OXYTOCIN-SODIUM CHLORIDE 0.9% IV SOLN 30 UNIT/500ML 5 MILLI-UNITS/MIN: 30-0.9/5 SOLUTION at 21:39

## 2017-10-14 RX ADMIN — MAGNESIUM SULFATE IN WATER 2 G/HR: 40 INJECTION, SOLUTION INTRAVENOUS at 22:42

## 2017-10-14 RX ADMIN — OXYTOCIN-SODIUM CHLORIDE 0.9% IV SOLN 30 UNIT/500ML 6 MILLI-UNITS/MIN: 30-0.9/5 SOLUTION at 22:03

## 2017-10-14 RX ADMIN — SODIUM CHLORIDE, POTASSIUM CHLORIDE, SODIUM LACTATE AND CALCIUM CHLORIDE 125 ML/HR: 600; 310; 30; 20 INJECTION, SOLUTION INTRAVENOUS at 23:15

## 2017-10-14 RX ADMIN — SERTRALINE HYDROCHLORIDE 50 MG: 50 TABLET, FILM COATED ORAL at 23:56

## 2017-10-14 RX ADMIN — Medication 6 ML/HR: at 10:15

## 2017-10-14 RX ADMIN — OXYTOCIN-SODIUM CHLORIDE 0.9% IV SOLN 30 UNIT/500ML 4 MILLI-UNITS/MIN: 30-0.9/5 SOLUTION at 21:11

## 2017-10-14 NOTE — H&P
[facility] Labor and Delivery History and Physical    Valeria Thibodeaux MRN# [id number]   Age: 22 year old YOB: 1995     Date of Admission:  10/14/2017    Primary care provider: No Ref-Primary, Physician           Chief Complaint:   Valeria Thibodeaux is a 22 year old female who is 40w5d pregnant and being admitted for active labor management. Prenatal record/H and P reviewed with patient and unchanged.          Pregnancy history:     OBSTETRIC HISTORY:    Obstetric History       T0      L0     SAB0   TAB0   Ectopic0   Multiple0   Live Births0       # Outcome Date GA Lbr Mark/2nd Weight Sex Delivery Anes PTL Lv   1 Current                   EDC: Estimated Date of Delivery: Oct 9, 2017    Prenatal Labs:   Lab Results   Component Value Date    ABO A 10/14/2017    RH Pos 10/14/2017    AS Neg 10/14/2017    CHPCRT Negative 2017    GCPCRT Negative 2017    TREPAB Non reactive  2017    RUBELLAABIGG >0.99 2017    HGB 10.8 (L) 10/14/2017       GBS Status:   Lab Results   Component Value Date    GBS Negative 2017       Active Problem List  Patient Active Problem List   Diagnosis     Tobacco dependence     ADHD (attention deficit hyperactivity disorder)     Supervision of normal first pregnancy in third trimester     Glucose intolerance of pregnancy     Encounter for triage in pregnant patient     Normal labor       Medication Prior to Admission  Prescriptions Prior to Admission   Medication Sig Dispense Refill Last Dose     Prenatal Vit-Fe Fumarate-FA (PRENATAL MULTIVITAMIN PLUS IRON) 27-0.8 MG TABS per tablet Take 1 tablet by mouth daily   10/13/2017 at 2300     Sertraline HCl (ZOLOFT PO) Take 50 mg by mouth daily   10/13/2017 at 2230   .        Maternal Past Medical History:     Past Medical History:   Diagnosis Date     Tobacco dependence 2015                       Family History:   Unchanged from prenatal record            Social History:    Unchanged from prenatal record         Review of Systems:   Per HPI.  Other systems reviewed and negative         Physical Exam:     Vitals:    10/14/17 0457 10/14/17 0853   BP: 146/98 136/95   Pulse: 94    Resp: (!) 28 16   Temp: 97.9  F (36.6  C) 98.2  F (36.8  C)   TempSrc: Oral Oral   SpO2: 97%    Weight: 72.6 kg (160 lb)    Height: 1.524 m (5')      Chest: CTA  CV:  RRR without murmers  General:  Alert and oriented  Abdomen soft, non-tender, gravid  Ext: neg  Cervix: pern RN   Membranes: intact   Dilation: 4   Effacement: 80%   Station:-3   Consistency: soft     Presentation:Cephalic  Fetal Heart Rate Tracing: reactive and reassuring  Tocometer: external monitor and frequency q 3 minutes                       Assessment:   Valeria Thibodeaux is a 40w5d pregnant female admitted with active labor management.          Plan:   Admit, Routine intrapartum care and monitoring per protocol.  Desires epidural.  Anesthesia notified.  Expectant mgmt of .    Ramez Marquez MD

## 2017-10-14 NOTE — ANESTHESIA PREPROCEDURE EVALUATION
Anesthesia Evaluation       history and physical reviewed .      No history of anesthetic complications          ROS/MED HX    ENT/Pulmonary:  - neg pulmonary ROS     Neurologic:  - neg neurologic ROS     Cardiovascular:     (+) hypertension----. : . . . :. .       METS/Exercise Tolerance:     Hematologic:  - neg hematologic  ROS       Musculoskeletal:  - neg musculoskeletal ROS       GI/Hepatic:  - neg GI/hepatic ROS       Renal/Genitourinary:  - ROS Renal section negative       Endo:  - neg endo ROS       Psychiatric:  - neg psychiatric ROS       Infectious Disease:  - neg infectious disease ROS       Malignancy:      - no malignancy   Other:    (+) C-spine cleared: Yes, no H/O Chronic Pain,no other significant disability                    Physical Exam  Normal systems: cardiovascular, pulmonary and dental    Airway   Mallampati: II  TM distance: < 3 FB  Neck ROM: full  Mouth opening: > 3 cm    Dental     Cardiovascular       Pulmonary           neg OB ROS                 Anesthesia Plan      History & Physical Review  History and physical reviewed and following examination; no interval change.    ASA Status:  2 emergent.  OB Epidural Asa: 2   NPO Status:  Full stomach    Plan for Spinal and Periph. Nerve Block for postop pain   PONV prophylaxis:  Ondansetron (or other 5HT-3)       Postoperative Care  Postoperative pain management:  Multi-modal analgesia.      Consents  Anesthetic plan, risks, benefits and alternatives discussed with:  Patient and Patient..                          .

## 2017-10-14 NOTE — IP AVS SNAPSHOT
[facility]    [address] [address]    Phone:  [phone]    Fax:  [phone]                                       After Visit Summary   10/14/2017    Valeria Thibodeaux    MRN: [id number]           After Visit Summary Signature Page     I have received my discharge instructions, and my questions have been answered. I have discussed any challenges I see with this plan with the nurse or doctor.    ..........................................................................................................................................  Patient/Patient Representative Signature      ..........................................................................................................................................  Patient Representative Print Name and Relationship to Patient    ..................................................               ................................................  Date                                            Time    ..........................................................................................................................................  Reviewed by Signature/Title    ...................................................              ..............................................  Date                                                            Time

## 2017-10-14 NOTE — PROGRESS NOTES
(S) comfortable.  Neg PIH sx.  Low urine output  noted.   (O)  Vitals:    10/14/17 1806 10/14/17 1811 10/14/17 1816 10/14/17 1821   BP: 146/91      Pulse:       Resp:       Temp:       TempSrc:       SpO2: 99% 99% 99% 97%   Weight:       Height:         Cervix:   Membranes: meconium stained fluid   Dilation: 7   Effacement: 100%   Station:0    Fetal Heart Rate Tracing: decelerations occasional non repetitive.  Good variablility  Cat:2          Tocometer: IUPC Q 2-4 min 100-150 MVU    (A/P)  Arrest of active phase of labor with fetal intolerance to pitocin increases.  .  Possible mild Pre-E.  Check PIH labs.    Options discussed with pt.  CPM and recheck in two hours.

## 2017-10-14 NOTE — IP AVS SNAPSHOT
MRN:[id number]                      After Visit Summary   10/14/2017    Valeria Thibodeaux    MRN: [id number]           Thank you!     Thank you for choosing [facility] for your care. Our goal is always to provide you with excellent care. Hearing back from our patients is one way we can continue to improve our services. Please take a few minutes to complete the written survey that you may receive in the mail after you visit with us. Thank you!        Patient Information     Date Of Birth          1995        Designated Caregiver       Most Recent Value    Caregiver    Will someone help with your care after discharge? yes    Name of designated caregiver Erin     Phone number of caregiver [phone]    Caregiver address same as pt       About your hospital stay     You were admitted on:  October 14, 2017 You last received care in the:  [facility]    You were discharged on:  October 17, 2017       Who to Call     For medical emergencies, please call 911.  For non-urgent questions about your medical care, please call your primary care provider or clinic, None  For questions related to your surgery, please call your surgery clinic        Attending Provider     Provider Specialty    Ramez Marquez MD OB/Gyn       Primary Care Provider    Physician No Ref-Primary      Your next 10 appointments already scheduled     Oct 23, 2017 11:30 AM CDT   (Arrive by 11:15 AM)   Post Op with JOCY Garcia CNM   [facility] ([facility] [place] )    [address] [facility] [address]   [phone]            Oct 25, 2017  2:00 PM CDT   Consult HOD with [facility] NURSE   [facility] ([facility] )    [address] [facility] [id number] [phone]              Further instructions from your care team       No heavy lifting greater than 20lb  for 4 weeks  No driving today or while on pain meds  Pelvic rest for 4 weeks  No vigorous activity, exercise,  swim, bath for 4 weeks  Schedule  Lactation f/u 2-4 days prn.  Schedule PO stable removal Next Monday [facility]  Call Dr. Marquez [phone] as necessary if problems in interim    Postop  Birth Instructions    Activity    Do not lift more than 10 pounds for 6 weeks after surgery.  Ask family and friends for help when you need it.  No driving until you have stopped taking your narcotic pain medications   No heavy exercise or activity for 6 weeks.  Don't do anything that will put a strain on your surgery site.  Don't strain when using the toilet.  Your care team may prescribe a stool softener if you have problems with your bowel movements.    To care for your incision:    Keep the incision clean and dry.  Do not soak your incision in water. No swimming or hot tubs until it has fully healed. You may soak in the bathtub if the water level is below your incision.  Do not use peroxide, gel, cream, lotion, or ointment on your incision.  Adjust your clothes to avoid pressure on your surgery site (check the elastic in your underwear for example).    You may see a small amount of clear or pink drainage and this is normal.  Check with your health care provider:    If the drainage increases or has an odor.  If the incision reddens, you have swelling, or develop a rash.  If you have increased pain and the medicine we prescribed doesn't help.  If you have a fever above 100.4 F (38 C) with or without chills when placing thermometer under your tongue.  The area around your incision (surgery wound), will feel numb.  This is normal. The numbness should go away in less than a year.     Keep your hands clean:  Always wash your hands before touching your incision (surgery wound). This helps reduce your risk of infection. If your hands aren't dirty, you may use an alcohol hand-rub to clean your hands. Keep your nails clean and short.    Call your healthcare provider if you have any of these symptoms:    You soak a sanitary  "pad with blood within 1 hour, or you see blood clots larger than a golf ball.  Bleeding that lasts more than 6 weeks.  Vaginal discharge that smells bad.  Severe pain, cramping or tenderness in your lower belly area.  A need to urinate more frequently (use the toilet more often), more urgently (use the toilet very quickly), or it burns when you urinate.  Nausea and vomiting.  Redness, swelling or pain around a vein in your leg.  Problems breastfeeding or a red or painful area on your breast.  Chest pain and cough or are gasping for air.  Problems with coping with sadness, anxiety or depression. If you have concerns about hurting yourself or the baby, call your provider immediately. The Safe Place for Newborns law allows you to bring your baby to the hospital up to 7 days after birth, no questions asked.  You have questions or concerns after you return home.      Pending Results     No orders found from 10/12/2017 to 10/15/2017.            Admission Information     Date & Time Provider Department Dept. Phone    10/14/2017 Ramez Marquez MD [facility] [phone]      Your Vitals Were     Blood Pressure Pulse Temperature Respirations Height Weight    139/85 88 99  F (37.2  C) (Oral) 18 1.524 m (5') 72.6 kg (160 lb)    Last Period Pulse Oximetry BMI (Body Mass Index)             01/02/2017 95% 31.25 kg/m2         [employer] Information     [employer] lets you send messages to your doctor, view your test results, renew your prescriptions, schedule appointments and more. To sign up, go to [email or web address] . Click on \"Log in\" on the left side of the screen, which will take you to the Welcome page. Then click on \"Sign up Now\" on the right side of the page.     You will be asked to enter the access code listed below, as well as some personal information. Please follow the directions to create your username and password.     Your access code is: [email or web address]  Expires: 12/13/2017  4:49 PM     Your access code " will  in 90 days. If you need help or a new code, please call your [facility] clinic or [phone].        Care EveryWhere ID     This is your Care EveryWhere ID. This could be used by other organizations to access your [facility] medical records  [id number]        Equal Access to Services     NARINDER HILL : Hadii rober hill hadasho Soomaali, waaxda luqadaha, qaybta kaalmada adeegyada, liz winstonlindseyjax weiss. So [facility] [phone].    ATENCIÓN: Si habla español, tiene a rodriguez disposición servicios gratuitos de asistencia lingüística. Llame al [phone].    We comply with applicable federal civil rights laws and [place] laws. We do not discriminate on the basis of race, color, national origin, age, disability, sex, sexual orientation, or gender identity.               Review of your medicines      START taking        Dose / Directions    cephALEXin 500 MG capsule   Commonly known as:  KEFLEX        Dose:  500 mg   Take 1 capsule (500 mg) by mouth 3 times daily for 7 days   Quantity:  21 capsule   Refills:  0       ferrous sulfate 325 (65 FE) MG tablet   Commonly known as:  IRON        Dose:  325 mg   Take 1 tablet (325 mg) by mouth 2 times daily   Quantity:  60 tablet   Refills:  2       ibuprofen 800 MG tablet   Commonly known as:  ADVIL/MOTRIN        Dose:  800 mg   Take 1 tablet (800 mg) by mouth every 8 hours as needed for moderate pain   Quantity:  40 tablet   Refills:  1       oxyCODONE-acetaminophen 5-325 MG per tablet   Commonly known as:  PERCOCET        Dose:  2 tablet   Take 2 tablets by mouth every 6 hours as needed for moderate to severe pain   Quantity:  40 tablet   Refills:  0       senna-docusate 8.6-50 MG per tablet   Commonly known as:  NICKIE-COLACE        Dose:  1-2 tablet   Take 1-2 tablets by mouth 2 times daily as needed for constipation   Quantity:  20 tablet   Refills:  1         CONTINUE these medicines which have NOT CHANGED        Dose / Directions    prenatal  multivitamin plus iron 27-0.8 MG Tabs per tablet        Dose:  1 tablet   Take 1 tablet by mouth daily   Refills:  0       ZOLOFT PO        Dose:  50 mg   Take 50 mg by mouth daily   Refills:  0            Where to get your medicines      These medications were sent to [facility] [id number] [place], [place] - [address] [place] [address]  [address] [place] [id number]     Phone:  [phone]     cephALEXin 500 MG capsule    ferrous sulfate 325 (65 FE) MG tablet    ibuprofen 800 MG tablet    senna-docusate 8.6-50 MG per tablet         Some of these will need a paper prescription and others can be bought over the counter. Ask your nurse if you have questions.     Bring a paper prescription for each of these medications     oxyCODONE-acetaminophen 5-325 MG per tablet               ANTIBIOTIC INSTRUCTION     You've Been Prescribed an Antibiotic - Now What?  Your healthcare team thinks that you or your loved one might have an infection. Some infections can be treated with antibiotics, which are powerful, life-saving drugs. Like all medications, antibiotics have side effects and should only be used when necessary. There are some important things you should know about your antibiotic treatment.      Your healthcare team may run tests before you start taking an antibiotic.    Your team may take samples (e.g., from your blood, urine or other areas) to run tests to look for bacteria. These test can be important to determine if you need an antibiotic at all and, if you do, which antibiotic will work best.      Within a few days, your healthcare team might change or even stop your antibiotic.    Your team may start you on an antibiotic while they are working to find out what is making you sick.    Your team might change your antibiotic because test results show that a different antibiotic would be better to treat your infection.    In some cases, once your team has more information, they learn that you do not need an antibiotic at  all. They may find out that you don't have an infection, or that the antibiotic you're taking won't work against your infection. For example, an infection caused by a virus can't be treated with antibiotics. Staying on an antibiotic when you don't need it is more likely to be harmful than helpful.      You may experience side effects from your antibiotic.    Like all medications, antibiotics have side effects. Some of these can be serious.    Let you healthcare team know if you have any known allergies when you are admitted to the hospital.    One significant side effect of nearly all antibiotics is the risk of severe and sometimes deadly diarrhea caused by Clostridium difficile (C. Difficile). This occurs when a person takes antibiotics because some good germs are destroyed. Antibiotic use allows C. diificile to take over, putting patients at high risk for this serious infection.    As a patient or caregiver, it is important to understand your or your loved one's antibiotic treatment. It is especially important for caregivers to speak up when patients can't speak for themselves. Here are some important questions to ask your healthcare team.    What infection is this antibiotic treating and how do you know I have that infection?    What side effects might occur from this antibiotic?    How long will I need to take this antibiotic?    Is it safe to take this antibiotic with other medications or supplements (e.g., vitamins) that I am taking?     Are there any special directions I need to know about taking this antibiotic? For example, should I take it with food?    How will I be monitored to know whether my infection is responding to the antibiotic?    What tests may help to make sure the right antibiotic is prescribed for me?      Information provided by:  www.cdc.gov/getsmart  U.S. Department of Health and Human Services  Centers for disease Control and Prevention  National Center for Emerging and Zoonotic  Infectious Diseases  Division of Healthcare Quality Promotion         Protect others around you: Learn how to safely use, store and throw away your medicines at www.disposemymeds.org.             Medication List: This is a list of all your medications and when to take them. Check marks below indicate your daily home schedule. Keep this list as a reference.      Medications           Morning Afternoon Evening Bedtime As Needed    cephALEXin 500 MG capsule   Commonly known as:  KEFLEX   Take 1 capsule (500 mg) by mouth 3 times daily for 7 days                                ferrous sulfate 325 (65 FE) MG tablet   Commonly known as:  IRON   Take 1 tablet (325 mg) by mouth 2 times daily                                ibuprofen 800 MG tablet   Commonly known as:  ADVIL/MOTRIN   Take 1 tablet (800 mg) by mouth every 8 hours as needed for moderate pain   Last time this was given:  800 mg on 10/17/2017  9:43 AM                                oxyCODONE-acetaminophen 5-325 MG per tablet   Commonly known as:  PERCOCET   Take 2 tablets by mouth every 6 hours as needed for moderate to severe pain                                prenatal multivitamin plus iron 27-0.8 MG Tabs per tablet   Take 1 tablet by mouth daily                                senna-docusate 8.6-50 MG per tablet   Commonly known as:  NICKIE-COLACE   Take 1-2 tablets by mouth 2 times daily as needed for constipation                                ZOLOFT PO   Take 50 mg by mouth daily   Last time this was given:  50 mg on 10/16/2017  9:53 PM                                          More Information        Breast Care After Birth  A few days after your baby s birth, your breasts will swell with milk. They are likely to feel tender and heavy. This is normal. To help prevent breast soreness and control irritation, follow these tips:     Moist heat, like a shower, helps to promote the release of breastmilk.   Coping with swelling  Here are tips to cope with  swelling:    Use cold compresses or an ice pack to help reduce the ache or pain.    Breastfeed often to keep milk from clogging your breast ducts.    If your nipples are flat from breast swelling, hand express some milk. Squeeze out a few drops of milk by massaging and compressing your breasts.    If you have swelling with pain or fever, call your healthcare provider.  Preventing sore nipples  Here are tips to prevent sore nipples:    Make sure baby latches on to your breast correctly. The baby s mouth should be opened very wide and your entire areola should be in the baby's mouth.    You can let milk dry on your nipples. This dried milk can protect the skin on your nipple.    Do not use alcohol, soap, or scented cleansers on your breasts. These can cause the nipples to dry and crack.    Do not wear nursing pads that are lined with plastic. They hold in moisture and can cause chapping.    If you have cracked or bleeding nipples, consult your healthcare provider or a lactation consultant. He or she will make sure that your baby's latch is correct and may suggest topical treatment, like pure lanolin.  Choosing a good bra  Wearing the right-sized bra is especially important now. If a bra is too tight, it may cause a duct in your breast to clog and become irritated. If possible, have a salesperson help fit you for a new bra. Look for one that s 100% cotton and comfortable. Also, choose a bra with wide straps that won t dig into your back and shoulders. If you re breastfeeding, find a nursing bra that allows you to uncover one breast at a time.  If you are not breastfeeding  Here are tips to avoid discomfort:    Avoid stimulation of nipples    Wear a tight-fitting bra    Apply cold compresses or ice packs for discomfort     Call your healthcare provider   Call your healthcare provider right away if you have any of the following:    A fever or chills    Extreme tiredness and body aches, as if you have the flu    Burning  or pain in one or both breasts    Red streaks on a breast    Hard or lumpy spots in one or both breasts    A feeling of warmth or heat in one or both breasts    Breasts so swollen your baby cannot latch on to the nipples    Nipples that are cracked or bleeding    Low milk supply or your milk does not flow freely   Date Last Reviewed: 9/7/2015 2000-2017 The [employer]. [address] [place] [address], [place] [address]. All rights reserved. This information is not intended as a substitute for professional medical care. Always follow your healthcare professional's instructions.                Nutrition While Breastfeeding  Do I need a special diet for breastfeeding?  You don't have to eat a special diet to produce enough milk for your baby. Also, your milk will be of good quality for your baby regardless of what you eat. However, your body needs fuel to make breastmilk, so eat your fill of a variety of foods. Breastfeeding isn t an excuse to eat and drink everything you want, but it s not a reason to avoid favorite foods either.   Healthy diet for the new mother  A healthy diet is recommended for all women and offers many benefits to the new mother. Choosing a variety of healthy foods creates a pattern for the entire family, and each family member benefits. Women who are breastfeeding need about 500 extra calories per day. Some women might need more, while others might need less. When choosing foods, use the nutrition chart below as a guide.    Bread, cereal, rice, and pasta Vegetables Fruit   Milk, yogurt, and cheese Meat, poultry, fish,  dry beans, eggs, and nuts Fats, oils, and sweets  (use sparingly)   What s good for you?  Here are some things to do:    Breastfeeding women need to drink when they feel thirsty. There is no specific amount of water you need to drink to make enough milk.    Follow healthy eating guidelines.    Snack on fruit or low-fat dairy products if you re hungry between meals.    If your  healthcare provider recommends it, keep taking prenatal vitamins.  What s not good for you?  Here are other things to consider:    Limit fatty foods and foods that are high in sugar (cookies, cakes).    Be aware that what enters your body may pass into your breastmilk. Limit caffeine. It is not just in coffee, but is also in cola, tea, and chocolate.    Talk with your healthcare provider before taking any medicines. It is important to let your healthcare provider know that you are nursing. Some medicines are not safe with breastfeeding.    Remember: alcohol, cigarettes, and drugs also affect your breastmilk and your baby. Talk with your healthcare provider.  Date Last Reviewed: 2015-2017 [employer]. [address] [place] [address], [place] [address]. All rights reserved. This information is not intended as a substitute for professional medical care. Always follow your healthcare professional's instructions.                How to Breastfeed  Babies use their lips, gums, and tongue to take milk from the breast (suckle). Your baby is born with an instinct for suckling. But it takes time for you and your baby to learn how to breastfeed. There are steps you can take to support your baby s natural instincts.  Skin-to-skin  If possible, hold your baby bare against your skin (skin-to-skin) just after giving birth and for a few hours after. You can also continue to do this in the first few weeks after birth.   How often should I feed my baby?  Nurse your  8 to 12 times every 24 hours. Feed your baby whenever he or she shows signs of hunger. When your baby is hungry, he or she will appear more awake and might root. Rooting means turning his or her head toward you when you stroke your baby s cheek. Your baby might also make a sucking sound or suck on his or her hand. Crying is a late sign of hunger. If your baby is crying, it may be hard for him or her to calm down to breastfeed. Infants will often eat  "at irregular times. But feedings will usually become more regular over time. Sometimes your baby might eat several times in a row (cluster feeding) and then take a break.   If your baby seems sleepy or too fussy to nurse, undress him or her and place your baby bare against your skin. Don't keep your baby swaddled tightly. This may keep him or her too sleepy to feed.  Change which breast you offer first with each feeding. For example, if you started nursing on the right side with the last feeding, offer the left side first with this feeding. Always offer the other breast after your baby stops nursing on the first side.  Ask your baby's healthcare provider about waking the baby for feeding. You may need to wake your baby and offer to nurse if it has been 4 hours since your baby's last feeding.     Offering your breast  Hold your breast with your thumb on top and fingers underneath in a loose . Gently stroke your nipple on your baby s lower lip. When you see your baby open his or her mouth wide, quickly bring the baby to your breast.     Latching on  The way your baby connects with the breast is called the latch. When your baby attaches, you should see more of the darker skin around the nipple (areola) above the baby's upper lip than below the lower lip. The front of your baby's entire body should be touching you. Your baby's nose and chin should be against the breast.  Your baby's cheeks should be full and not sinking inward. You should be able to see your baby's lips. They should be slightly flared outward. As your baby suckles, his or her jaw should open wide. It should not be \"munching\" as if chewing. Listen for swallowing.  It should not hurt when your baby latches on and suckles. If it does, try releasing the latch and starting over.      Releasing the latch  Let your baby nurse until satisfied. In most cases, when your baby is finished nursing, he or she will let go on his or her own. This tells you that " "your baby is done feeding on that breast. But you may need to release the latch sooner if you feel pain or for some other reason. To do this, slip your finger into the corner of your baby's mouth. You should feel the suction break. Only when the seal is broken, move your baby off your breast. Don't take the baby off your breast until you've felt a decrease in suction.    Burping your baby   babies don't need to burp as much as bottle-fed babies. Bottles flow faster, and babies tend to swallow more air. Try to burp your baby after each breast:    Hold the baby at your upper chest or slightly over your shoulder. Gently rub or pat the baby s back.    Or hold the baby sitting up on your lap. Support your baby's head and chest in front and in back. Slowly rock your baby back and forth.    Don t worry if your baby doesn't burp. He or she may not need to.   Date Last Reviewed: 3/1/2017    7178-8084 [employer]. [address] [place] [address]. All rights reserved. This information is not intended as a substitute for professional medical care. Always follow your healthcare professional's instructions.                Holding Your Baby While Breastfeeding      \"Laid-back\" position     Comfort and position are the keys to successful breastfeeding. Learn how to position your baby correctly at the breast. Choose the hold that works best for both of you. You may need to change holds as your baby grows.     Always make sure your baby is tummy-to-tummy with you.    Laid-back  baby-led natural position  Lie back on a sofa, bed, or reclining chair so that your body is at a comfortable 45-degree angle, but not flat. This may be more comfortable than sitting up and leaning over a breastfeeding pillow.  Here are some tips:    Place your baby on his or her tummy on your chest. When your baby feels your body with the whole front of his or her body, it triggers his or her senses to find your nipple. Let " "your baby move over to the breast and latch on without your help. Your arms will make a \"nest\" around your baby.    When your baby attaches, make sure you see more areola above the upper lip than below the lower lip. This should help protect your nipples from soreness.  Other positions you can try       Cradle hold \"Football\" hold Side-lying hold   Cradle hold or  cross-cradle  hold  Here are some tips:    Sit upright. Make sure you have back support and that you are comfortable and relaxed. Raise your baby to breast height. Use a pillow under your baby s bottom. Put your baby on his or her side on the pillow so his or her tummy is touching your tummy. Use a chair with armrests for your arms.    Keep your knees level with your hips. Put a stool or pillow under your feet if needed.    Cradle your baby. Make sure your baby s body is well supported by your arm (cradle hold). Or use your hand to support the base of your baby's head and neck (cross-cradle hold).     Make sure your baby s body is facing and touching your body with your baby's head higher than his or her bottom. It is easier for your baby to swallow that way.   Football  hold  You can use the football hold to breastfeed two babies at once.  Here are some tips:    Place a pillow at your side. Lay the baby s bottom on the pillow so that your baby's bottom is lower than his or her head. Hold your baby's neck so that your fingers are below his or her ears.     Make sure your baby's body is on his or her side so the whole front of your baby's body is touching yours.    Tuck your baby s legs between your arm and body, as if you were clutching a football or purse at your side.  Side-lying hold  Here are some tips:    Stretch out on your side. Using pillows to support your head, neck, and back. Place your baby on his or her side facing you so that the front part of your baby's body is against your body.    Support your baby s head, neck, and back with " your arm.    Let your baby find the nipple and attach without help.    Switch breasts. Gather your baby close to your chest. Then roll onto your other side to feed the same way from the other breast.    It is always possible to fall asleep while nursing, so make sure you are in a safe place when you use this hold. Do not use a couch. Follow your healthcare provider's advice about a safe sleep environment for your baby.  Date Last Reviewed: 3/1/2017    9486-2937 [employer]. [address] [place] [address], [place] [address]. All rights reserved. This information is not intended as a substitute for professional medical care. Always follow your healthcare professional's instructions.

## 2017-10-14 NOTE — PROGRESS NOTES
(S)  Comfortable.  One FHR deceleration early but resolved after position change.  Pitocin 2mu/min  (O)  Vitals:    10/14/17 1531 10/14/17 1534 10/14/17 1536 10/14/17 1541   BP:       Pulse:       Resp:       Temp:  98.6  F (37  C)     TempSrc:  Oral     SpO2: 97%  97% 97%   Weight:       Height:         Cervix:   Membranes: meconium stained fluid   Dilation: 7   Effacement: 100%   Station:0    Fetal Heart Rate Tracing: reactive 150-s.  + response to scalp stim  Tocometer: external monitor and frequency q 2-5 minutes, inadequate    (A/P)  CPM

## 2017-10-14 NOTE — ANESTHESIA PROCEDURE NOTES
Peripheral nerve/Neuraxial procedure note : epidural catheter  Pre-Procedure    Location: pre-op    Procedure Times:10/14/2017 10:00 AM and 10/14/2017 10:16 AM  Pre-Anesthestic Checklist: patient identified, IV checked, site marked, risks and benefits discussed, informed consent, monitors and equipment checked, pre-op evaluation, at physician/surgeon's request and post-op pain management    Timeout  Correct Patient: Yes   Correct Procedure: Yes   Correct Site: Yes   Correct Laterality: Yes   Correct Position: Yes   Site Marked: Yes   .   Procedure Documentation    Diagnosis:labor pain.    Procedure:    Epidural catheter.  Insertion Site:L3-4  (midline approach) Injection technique: LORT saline   Local skin infiltrated with 3 mL of 1% lidocaine.  JOSEP at 7 cm     Patient Prep;povidone-iodine 7.5% surgical scrub.  .  Needle: Touhy needle Needle Gauge: 18.    Needle Length (Inches) 3.5  # of attempts: 1 and  # of redirects:  1 .   Catheter: 20 G . .  Catheter threaded easily  5 cm epidural space.  12 cm at skin.   .    Assessment/Narrative  Paresthesias: Resolved and Yes.  .  .  Aspiration negative for heme or CSF  . Test dose of 3 mL lidocaine 1.5% w/ 1:200,000 epinephrine at 10:05.  Test dose negative for signs of intravascular, subdural or intrathecal injection. Comments:  Lot [id number] exp 11-

## 2017-10-14 NOTE — PROGRESS NOTES
(S)  Comfortable with epidural  (O)    Cervix:   Membranes: AROM  meconium stained fluid   Dilation: 4.5   Effacement: 90%   Station:-2 engaged    Fetal Heart Rate Tracing: reactive and reassuring  Cat:1          Tocometer: external monitor and frequency q 2-3 minutes    (A/P)  Doing well.  MSAF.  Peds notified for delivery.  CPM.    Repeat UA shows 300 protein.  No PIH sx, nml reflexes and nml BP.  Will monitor for Preeclampsia signs.

## 2017-10-14 NOTE — PLAN OF CARE
OB Triage Note  Valeria Thibodeaux  MRN: [id number]  Gestational Age: 40w5d      Valeria Thibodeaux presents for contractions.      States theo since 315.  Rates pain at 10/10.  Bleeding: bloody show  began at 5:30.  Denies LOF.      Dr. Marquez notified of arrival and condition.  Oriented patient to surroundings. Call light within reach.     FHT: 140  NST Start Time:0500  NST Stop Time: continuing (waiting for Accels)   NST: reassuring  Uterine Assessment:Contractions: frequency q 4-5 minutes of moderate quality.    Plan:  -Initial NST, then fetal/uterine monitoring per MD/patient plan.  -Sterile vaginal exam/sterile speculum exam.  -Nursing education on early labor provided.

## 2017-10-15 ENCOUNTER — SURGERY (OUTPATIENT)
Age: 22
End: 2017-10-15

## 2017-10-15 PROBLEM — O14.93 PRE-ECLAMPSIA IN THIRD TRIMESTER: Status: ACTIVE | Noted: 2017-10-15

## 2017-10-15 LAB
ANION GAP SERPL CALCULATED.3IONS-SCNC: 9 MMOL/L (ref 3–14)
BUN SERPL-MCNC: 19 MG/DL (ref 7–30)
CALCIUM SERPL-MCNC: 7.9 MG/DL (ref 8.5–10.1)
CHLORIDE SERPL-SCNC: 99 MMOL/L (ref 94–109)
CO2 SERPL-SCNC: 23 MMOL/L (ref 20–32)
CREAT SERPL-MCNC: 1.13 MG/DL (ref 0.52–1.04)
ERYTHROCYTE [DISTWIDTH] IN BLOOD BY AUTOMATED COUNT: 12.6 % (ref 10–15)
GFR SERPL CREATININE-BSD FRML MDRD: 59 ML/MIN/1.7M2
GLUCOSE SERPL-MCNC: 139 MG/DL (ref 70–99)
HCT VFR BLD AUTO: 30 % (ref 35–47)
HGB BLD-MCNC: 10.2 G/DL (ref 11.7–15.7)
MAGNESIUM SERPL-MCNC: 4.7 MG/DL (ref 1.6–2.3)
MCH RBC QN AUTO: 29.2 PG (ref 26.5–33)
MCHC RBC AUTO-ENTMCNC: 34 G/DL (ref 31.5–36.5)
MCV RBC AUTO: 86 FL (ref 78–100)
PLATELET # BLD AUTO: 172 10E9/L (ref 150–450)
POTASSIUM SERPL-SCNC: 4.3 MMOL/L (ref 3.4–5.3)
RBC # BLD AUTO: 3.49 10E12/L (ref 3.8–5.2)
SODIUM SERPL-SCNC: 131 MMOL/L (ref 133–144)
WBC # BLD AUTO: 18.3 10E9/L (ref 4–11)

## 2017-10-15 PROCEDURE — 27210794 ZZH OR GENERAL SUPPLY STERILE: Performed by: OBSTETRICS & GYNECOLOGY

## 2017-10-15 PROCEDURE — 85027 COMPLETE CBC AUTOMATED: CPT | Performed by: OBSTETRICS & GYNECOLOGY

## 2017-10-15 PROCEDURE — 37000011 ZZH ANESTHESIA WARD SERVICE: Performed by: NURSE ANESTHETIST, CERTIFIED REGISTERED

## 2017-10-15 PROCEDURE — 71000014 ZZH RECOVERY PHASE 1 LEVEL 2 FIRST HR: Performed by: OBSTETRICS & GYNECOLOGY

## 2017-10-15 PROCEDURE — 25000128 H RX IP 250 OP 636: Performed by: OBSTETRICS & GYNECOLOGY

## 2017-10-15 PROCEDURE — 83735 ASSAY OF MAGNESIUM: CPT | Performed by: OBSTETRICS & GYNECOLOGY

## 2017-10-15 PROCEDURE — 40000275 ZZH STATISTIC RCP TIME EA 10 MIN

## 2017-10-15 PROCEDURE — 37000009 ZZH ANESTHESIA TECHNICAL FEE, EACH ADDTL 15 MIN: Performed by: OBSTETRICS & GYNECOLOGY

## 2017-10-15 PROCEDURE — 36000058 ZZH SURGERY LEVEL 3 EA 15 ADDTL MIN: Performed by: OBSTETRICS & GYNECOLOGY

## 2017-10-15 PROCEDURE — 25000125 ZZHC RX 250: Performed by: NURSE ANESTHETIST, CERTIFIED REGISTERED

## 2017-10-15 PROCEDURE — 59515 CESAREAN DELIVERY: CPT | Performed by: OBSTETRICS & GYNECOLOGY

## 2017-10-15 PROCEDURE — 27110028 ZZH OR GENERAL SUPPLY NON-STERILE: Performed by: OBSTETRICS & GYNECOLOGY

## 2017-10-15 PROCEDURE — 36000056 ZZH SURGERY LEVEL 3 1ST 30 MIN: Performed by: OBSTETRICS & GYNECOLOGY

## 2017-10-15 PROCEDURE — 37000008 ZZH ANESTHESIA TECHNICAL FEE, 1ST 30 MIN: Performed by: OBSTETRICS & GYNECOLOGY

## 2017-10-15 PROCEDURE — 01999 UNLISTED ANES PROCEDURE: CPT | Performed by: NURSE ANESTHETIST, CERTIFIED REGISTERED

## 2017-10-15 PROCEDURE — 25000132 ZZH RX MED GY IP 250 OP 250 PS 637: Performed by: OBSTETRICS & GYNECOLOGY

## 2017-10-15 PROCEDURE — 80048 BASIC METABOLIC PNL TOTAL CA: CPT | Performed by: OBSTETRICS & GYNECOLOGY

## 2017-10-15 PROCEDURE — 12000027 ZZH R&B OB

## 2017-10-15 PROCEDURE — 25000128 H RX IP 250 OP 636: Performed by: NURSE ANESTHETIST, CERTIFIED REGISTERED

## 2017-10-15 PROCEDURE — 36415 COLL VENOUS BLD VENIPUNCTURE: CPT | Performed by: OBSTETRICS & GYNECOLOGY

## 2017-10-15 PROCEDURE — 25000125 ZZHC RX 250: Performed by: OBSTETRICS & GYNECOLOGY

## 2017-10-15 RX ORDER — OXYTOCIN 10 [USP'U]/ML
10 INJECTION, SOLUTION INTRAMUSCULAR; INTRAVENOUS
Status: DISCONTINUED | OUTPATIENT
Start: 2017-10-15 | End: 2017-10-17 | Stop reason: HOSPADM

## 2017-10-15 RX ORDER — ONDANSETRON 2 MG/ML
4 INJECTION INTRAMUSCULAR; INTRAVENOUS EVERY 6 HOURS PRN
Status: DISCONTINUED | OUTPATIENT
Start: 2017-10-15 | End: 2017-10-17 | Stop reason: HOSPADM

## 2017-10-15 RX ORDER — EPHEDRINE SULFATE 50 MG/ML
INJECTION, SOLUTION INTRAVENOUS PRN
Status: DISCONTINUED | OUTPATIENT
Start: 2017-10-15 | End: 2017-10-15

## 2017-10-15 RX ORDER — PROCHLORPERAZINE 25 MG
25 SUPPOSITORY, RECTAL RECTAL EVERY 12 HOURS PRN
Status: DISCONTINUED | OUTPATIENT
Start: 2017-10-15 | End: 2017-10-17 | Stop reason: HOSPADM

## 2017-10-15 RX ORDER — BUPIVACAINE HYDROCHLORIDE 7.5 MG/ML
INJECTION, SOLUTION INTRASPINAL PRN
Status: DISCONTINUED | OUTPATIENT
Start: 2017-10-15 | End: 2017-10-15

## 2017-10-15 RX ORDER — LIDOCAINE 40 MG/G
CREAM TOPICAL
Status: DISCONTINUED | OUTPATIENT
Start: 2017-10-15 | End: 2017-10-15

## 2017-10-15 RX ORDER — MAGNESIUM SULFATE HEPTAHYDRATE 40 MG/ML
4 INJECTION, SOLUTION INTRAVENOUS
Status: DISCONTINUED | OUTPATIENT
Start: 2017-10-15 | End: 2017-10-17 | Stop reason: HOSPADM

## 2017-10-15 RX ORDER — ROPIVACAINE HYDROCHLORIDE 5 MG/ML
INJECTION, SOLUTION EPIDURAL; INFILTRATION; PERINEURAL PRN
Status: DISCONTINUED | OUTPATIENT
Start: 2017-10-15 | End: 2017-10-15

## 2017-10-15 RX ORDER — CEFAZOLIN SODIUM 1 G/3ML
INJECTION, POWDER, FOR SOLUTION INTRAMUSCULAR; INTRAVENOUS
Status: DISCONTINUED
Start: 2017-10-15 | End: 2017-10-15 | Stop reason: HOSPADM

## 2017-10-15 RX ORDER — LIDOCAINE 40 MG/G
CREAM TOPICAL
Status: DISCONTINUED | OUTPATIENT
Start: 2017-10-15 | End: 2017-10-17 | Stop reason: HOSPADM

## 2017-10-15 RX ORDER — VITAMIN A ACETATE, .BETA.-CAROTENE, ASCORBIC ACID, CHOLECALCIFEROL, .ALPHA.-TOCOPHEROL ACETATE, DL-, THIAMINE MONONITRATE, RIBOFLAVIN, NIACINAMIDE, PYRIDOXINE HYDROCHLORIDE, FOLIC ACID, CYANOCOBALAMIN, CALCIUM CARBONATE, FERROUS FUMARATE, ZINC OXIDE, AND CUPRIC OXIDE 2000; 2000; 120; 400; 22; 1.84; 3; 20; 10; 1; 12; 200; 27; 25; 2 [IU]/1; [IU]/1; MG/1; [IU]/1; MG/1; MG/1; MG/1; MG/1; MG/1; MG/1; UG/1; MG/1; MG/1; MG/1; MG/1
1 TABLET ORAL DAILY
Status: DISCONTINUED | OUTPATIENT
Start: 2017-10-15 | End: 2017-10-17 | Stop reason: HOSPADM

## 2017-10-15 RX ORDER — DEXTROSE, SODIUM CHLORIDE, SODIUM LACTATE, POTASSIUM CHLORIDE, AND CALCIUM CHLORIDE 5; .6; .31; .03; .02 G/100ML; G/100ML; G/100ML; G/100ML; G/100ML
INJECTION, SOLUTION INTRAVENOUS CONTINUOUS
Status: DISCONTINUED | OUTPATIENT
Start: 2017-10-15 | End: 2017-10-17 | Stop reason: HOSPADM

## 2017-10-15 RX ORDER — DEXAMETHASONE SODIUM PHOSPHATE 10 MG/ML
INJECTION, SOLUTION INTRAMUSCULAR; INTRAVENOUS PRN
Status: DISCONTINUED | OUTPATIENT
Start: 2017-10-15 | End: 2017-10-15

## 2017-10-15 RX ORDER — NALOXONE HYDROCHLORIDE 0.4 MG/ML
.1-.4 INJECTION, SOLUTION INTRAMUSCULAR; INTRAVENOUS; SUBCUTANEOUS
Status: DISCONTINUED | OUTPATIENT
Start: 2017-10-15 | End: 2017-10-15

## 2017-10-15 RX ORDER — OXYTOCIN/0.9 % SODIUM CHLORIDE 30/500 ML
100 PLASTIC BAG, INJECTION (ML) INTRAVENOUS CONTINUOUS
Status: DISCONTINUED | OUTPATIENT
Start: 2017-10-15 | End: 2017-10-17 | Stop reason: HOSPADM

## 2017-10-15 RX ORDER — NALOXONE HYDROCHLORIDE 0.4 MG/ML
.1-.4 INJECTION, SOLUTION INTRAMUSCULAR; INTRAVENOUS; SUBCUTANEOUS
Status: DISCONTINUED | OUTPATIENT
Start: 2017-10-15 | End: 2017-10-17 | Stop reason: HOSPADM

## 2017-10-15 RX ORDER — ONDANSETRON 2 MG/ML
4 INJECTION INTRAMUSCULAR; INTRAVENOUS EVERY 6 HOURS PRN
Status: DISCONTINUED | OUTPATIENT
Start: 2017-10-15 | End: 2017-10-15

## 2017-10-15 RX ORDER — MAGNESIUM SULFATE IN WATER 40 MG/ML
1 INJECTION, SOLUTION INTRAVENOUS CONTINUOUS
Status: DISPENSED | OUTPATIENT
Start: 2017-10-15 | End: 2017-10-16

## 2017-10-15 RX ORDER — AZITHROMYCIN 500 MG/1
INJECTION, POWDER, LYOPHILIZED, FOR SOLUTION INTRAVENOUS
Status: DISCONTINUED
Start: 2017-10-15 | End: 2017-10-15 | Stop reason: HOSPADM

## 2017-10-15 RX ORDER — HYDROCORTISONE 2.5 %
CREAM (GRAM) TOPICAL 3 TIMES DAILY PRN
Status: DISCONTINUED | OUTPATIENT
Start: 2017-10-15 | End: 2017-10-17 | Stop reason: HOSPADM

## 2017-10-15 RX ORDER — DIPHENHYDRAMINE HYDROCHLORIDE 50 MG/ML
25 INJECTION INTRAMUSCULAR; INTRAVENOUS EVERY 6 HOURS PRN
Status: DISCONTINUED | OUTPATIENT
Start: 2017-10-15 | End: 2017-10-17 | Stop reason: HOSPADM

## 2017-10-15 RX ORDER — ACETAMINOPHEN 325 MG/1
650 TABLET ORAL EVERY 4 HOURS PRN
Status: DISCONTINUED | OUTPATIENT
Start: 2017-10-18 | End: 2017-10-15

## 2017-10-15 RX ORDER — OXYCODONE HYDROCHLORIDE 5 MG/1
5-10 TABLET ORAL
Status: DISCONTINUED | OUTPATIENT
Start: 2017-10-15 | End: 2017-10-17 | Stop reason: HOSPADM

## 2017-10-15 RX ORDER — CITRIC ACID/SODIUM CITRATE 334-500MG
30 SOLUTION, ORAL ORAL
Status: DISCONTINUED | OUTPATIENT
Start: 2017-10-15 | End: 2017-10-15 | Stop reason: HOSPADM

## 2017-10-15 RX ORDER — HYDROXYZINE HYDROCHLORIDE 50 MG/ML
100 INJECTION, SOLUTION INTRAMUSCULAR EVERY 6 HOURS PRN
Status: DISCONTINUED | OUTPATIENT
Start: 2017-10-15 | End: 2017-10-17 | Stop reason: HOSPADM

## 2017-10-15 RX ORDER — OXYTOCIN 10 [USP'U]/ML
INJECTION, SOLUTION INTRAMUSCULAR; INTRAVENOUS PRN
Status: DISCONTINUED | OUTPATIENT
Start: 2017-10-15 | End: 2017-10-15

## 2017-10-15 RX ORDER — CEFAZOLIN SODIUM 2 G/100ML
2 INJECTION, SOLUTION INTRAVENOUS
Status: COMPLETED | OUTPATIENT
Start: 2017-10-15 | End: 2017-10-15

## 2017-10-15 RX ORDER — SODIUM CHLORIDE, SODIUM LACTATE, POTASSIUM CHLORIDE, CALCIUM CHLORIDE 600; 310; 30; 20 MG/100ML; MG/100ML; MG/100ML; MG/100ML
INJECTION, SOLUTION INTRAVENOUS CONTINUOUS
Status: DISCONTINUED | OUTPATIENT
Start: 2017-10-15 | End: 2017-10-15 | Stop reason: HOSPADM

## 2017-10-15 RX ORDER — ACETAMINOPHEN 325 MG/1
975 TABLET ORAL EVERY 8 HOURS
Status: DISCONTINUED | OUTPATIENT
Start: 2017-10-15 | End: 2017-10-17 | Stop reason: HOSPADM

## 2017-10-15 RX ORDER — CALCIUM GLUCONATE 94 MG/ML
1 INJECTION, SOLUTION INTRAVENOUS
Status: DISCONTINUED | OUTPATIENT
Start: 2017-10-15 | End: 2017-10-17 | Stop reason: HOSPADM

## 2017-10-15 RX ORDER — SODIUM CHLORIDE 9 MG/ML
INJECTION, SOLUTION INTRAVENOUS
Status: DISCONTINUED
Start: 2017-10-15 | End: 2017-10-15 | Stop reason: HOSPADM

## 2017-10-15 RX ORDER — ONDANSETRON 4 MG/1
4 TABLET, ORALLY DISINTEGRATING ORAL EVERY 6 HOURS PRN
Status: DISCONTINUED | OUTPATIENT
Start: 2017-10-15 | End: 2017-10-17 | Stop reason: HOSPADM

## 2017-10-15 RX ORDER — NALBUPHINE HYDROCHLORIDE 20 MG/ML
2.5-5 INJECTION, SOLUTION INTRAMUSCULAR; INTRAVENOUS; SUBCUTANEOUS EVERY 6 HOURS PRN
Status: DISCONTINUED | OUTPATIENT
Start: 2017-10-15 | End: 2017-10-17 | Stop reason: HOSPADM

## 2017-10-15 RX ORDER — DIPHENHYDRAMINE HCL 25 MG
25 CAPSULE ORAL EVERY 6 HOURS PRN
Status: DISCONTINUED | OUTPATIENT
Start: 2017-10-15 | End: 2017-10-17 | Stop reason: HOSPADM

## 2017-10-15 RX ORDER — MAGNESIUM SULFATE HEPTAHYDRATE 500 MG/ML
4 INJECTION, SOLUTION INTRAMUSCULAR; INTRAVENOUS
Status: DISCONTINUED | OUTPATIENT
Start: 2017-10-15 | End: 2017-10-15

## 2017-10-15 RX ORDER — MISOPROSTOL 200 UG/1
400 TABLET ORAL
Status: DISCONTINUED | OUTPATIENT
Start: 2017-10-15 | End: 2017-10-17 | Stop reason: HOSPADM

## 2017-10-15 RX ORDER — LABETALOL HYDROCHLORIDE 5 MG/ML
80 INJECTION, SOLUTION INTRAVENOUS EVERY 10 MIN PRN
Status: DISCONTINUED | OUTPATIENT
Start: 2017-10-15 | End: 2017-10-17 | Stop reason: HOSPADM

## 2017-10-15 RX ORDER — NIFEDIPINE 10 MG/1
10 CAPSULE ORAL
Status: DISCONTINUED | OUTPATIENT
Start: 2017-10-15 | End: 2017-10-17 | Stop reason: HOSPADM

## 2017-10-15 RX ORDER — LABETALOL HYDROCHLORIDE 5 MG/ML
40 INJECTION, SOLUTION INTRAVENOUS EVERY 10 MIN PRN
Status: DISCONTINUED | OUTPATIENT
Start: 2017-10-15 | End: 2017-10-17 | Stop reason: HOSPADM

## 2017-10-15 RX ORDER — SODIUM CHLORIDE, SODIUM LACTATE, POTASSIUM CHLORIDE, CALCIUM CHLORIDE 600; 310; 30; 20 MG/100ML; MG/100ML; MG/100ML; MG/100ML
10-125 INJECTION, SOLUTION INTRAVENOUS CONTINUOUS
Status: DISCONTINUED | OUTPATIENT
Start: 2017-10-15 | End: 2017-10-15

## 2017-10-15 RX ORDER — LORAZEPAM 2 MG/ML
2 INJECTION INTRAMUSCULAR
Status: DISCONTINUED | OUTPATIENT
Start: 2017-10-15 | End: 2017-10-17 | Stop reason: HOSPADM

## 2017-10-15 RX ORDER — EPHEDRINE SULFATE 50 MG/ML
5 INJECTION, SOLUTION INTRAMUSCULAR; INTRAVENOUS; SUBCUTANEOUS
Status: DISCONTINUED | OUTPATIENT
Start: 2017-10-15 | End: 2017-10-17 | Stop reason: HOSPADM

## 2017-10-15 RX ORDER — DOCUSATE SODIUM 100 MG/1
100 CAPSULE, LIQUID FILLED ORAL 2 TIMES DAILY
Status: DISCONTINUED | OUTPATIENT
Start: 2017-10-15 | End: 2017-10-17 | Stop reason: HOSPADM

## 2017-10-15 RX ORDER — SIMETHICONE 80 MG
80 TABLET,CHEWABLE ORAL 4 TIMES DAILY PRN
Status: DISCONTINUED | OUTPATIENT
Start: 2017-10-15 | End: 2017-10-17 | Stop reason: HOSPADM

## 2017-10-15 RX ORDER — ONDANSETRON 2 MG/ML
INJECTION INTRAMUSCULAR; INTRAVENOUS PRN
Status: DISCONTINUED | OUTPATIENT
Start: 2017-10-15 | End: 2017-10-15

## 2017-10-15 RX ORDER — LABETALOL HYDROCHLORIDE 5 MG/ML
20 INJECTION, SOLUTION INTRAVENOUS EVERY 10 MIN PRN
Status: DISCONTINUED | OUTPATIENT
Start: 2017-10-15 | End: 2017-10-17 | Stop reason: HOSPADM

## 2017-10-15 RX ORDER — LANOLIN 100 %
OINTMENT (GRAM) TOPICAL
Status: DISCONTINUED | OUTPATIENT
Start: 2017-10-15 | End: 2017-10-17 | Stop reason: HOSPADM

## 2017-10-15 RX ORDER — MAGNESIUM SULFATE HEPTAHYDRATE 40 MG/ML
2 INJECTION, SOLUTION INTRAVENOUS
Status: DISCONTINUED | OUTPATIENT
Start: 2017-10-15 | End: 2017-10-17 | Stop reason: HOSPADM

## 2017-10-15 RX ORDER — BISACODYL 10 MG
10 SUPPOSITORY, RECTAL RECTAL DAILY PRN
Status: DISCONTINUED | OUTPATIENT
Start: 2017-10-17 | End: 2017-10-17 | Stop reason: HOSPADM

## 2017-10-15 RX ORDER — OXYTOCIN/0.9 % SODIUM CHLORIDE 30/500 ML
340 PLASTIC BAG, INJECTION (ML) INTRAVENOUS CONTINUOUS PRN
Status: DISCONTINUED | OUTPATIENT
Start: 2017-10-15 | End: 2017-10-17 | Stop reason: HOSPADM

## 2017-10-15 RX ADMIN — SODIUM CHLORIDE, SODIUM LACTATE, POTASSIUM CHLORIDE, CALCIUM CHLORIDE AND DEXTROSE MONOHYDRATE: 5; 600; 310; 30; 20 INJECTION, SOLUTION INTRAVENOUS at 23:54

## 2017-10-15 RX ADMIN — OXYTOCIN 30 UNITS: 10 INJECTION, SOLUTION INTRAMUSCULAR; INTRAVENOUS at 02:44

## 2017-10-15 RX ADMIN — OXYCODONE HYDROCHLORIDE 5 MG: 5 TABLET ORAL at 21:25

## 2017-10-15 RX ADMIN — AZITHROMYCIN MONOHYDRATE 0.5 G: 500 INJECTION, POWDER, LYOPHILIZED, FOR SOLUTION INTRAVENOUS at 02:34

## 2017-10-15 RX ADMIN — OXYTOCIN-SODIUM CHLORIDE 0.9% IV SOLN 30 UNIT/500ML 2 MILLI-UNITS/MIN: 30-0.9/5 SOLUTION at 00:46

## 2017-10-15 RX ADMIN — CEFAZOLIN SODIUM 2 G: 2 INJECTION, SOLUTION INTRAVENOUS at 02:14

## 2017-10-15 RX ADMIN — ONDANSETRON 4 MG: 2 INJECTION INTRAMUSCULAR; INTRAVENOUS at 02:09

## 2017-10-15 RX ADMIN — ROPIVACAINE HYDROCHLORIDE 15 ML: 5 INJECTION, SOLUTION EPIDURAL; INFILTRATION; PERINEURAL at 03:11

## 2017-10-15 RX ADMIN — CEFOXITIN 1 G: 1 INJECTION, POWDER, FOR SOLUTION INTRAVENOUS at 21:09

## 2017-10-15 RX ADMIN — DOCUSATE SODIUM 100 MG: 100 CAPSULE, LIQUID FILLED ORAL at 21:09

## 2017-10-15 RX ADMIN — OXYCODONE HYDROCHLORIDE 5 MG: 5 TABLET ORAL at 16:08

## 2017-10-15 RX ADMIN — VITAMIN A ACETATE, .BETA.-CAROTENE, ASCORBIC ACID, CHOLECALCIFEROL, .ALPHA.-TOCOPHEROL ACETATE, DL-, THIAMINE MONONITRATE, RIBOFLAVIN, NIACINAMIDE, PYRIDOXINE HYDROCHLORIDE, FOLIC ACID, CYANOCOBALAMIN, CALCIUM CARBONATE, FERROUS FUMARATE, ZINC OXIDE, AND CUPRIC OXIDE 1 TABLET: 2000; 2000; 120; 400; 22; 1.84; 3; 20; 10; 1; 12; 200; 27; 25; 2 TABLET ORAL at 09:24

## 2017-10-15 RX ADMIN — HYDROMORPHONE HYDROCHLORIDE 0.2 MG: 1 INJECTION, SOLUTION INTRAMUSCULAR; INTRAVENOUS; SUBCUTANEOUS at 02:21

## 2017-10-15 RX ADMIN — SODIUM CHLORIDE, SODIUM LACTATE, POTASSIUM CHLORIDE, CALCIUM CHLORIDE AND DEXTROSE MONOHYDRATE: 5; 600; 310; 30; 20 INJECTION, SOLUTION INTRAVENOUS at 05:45

## 2017-10-15 RX ADMIN — SODIUM CHLORIDE, POTASSIUM CHLORIDE, SODIUM LACTATE AND CALCIUM CHLORIDE: 600; 310; 30; 20 INJECTION, SOLUTION INTRAVENOUS at 01:45

## 2017-10-15 RX ADMIN — ACETAMINOPHEN 975 MG: 325 TABLET, FILM COATED ORAL at 16:08

## 2017-10-15 RX ADMIN — DEXAMETHASONE SODIUM PHOSPHATE 5 MG: 10 INJECTION, SOLUTION INTRAMUSCULAR; INTRAVENOUS at 03:14

## 2017-10-15 RX ADMIN — CEFOXITIN 1 G: 1 INJECTION, POWDER, FOR SOLUTION INTRAVENOUS at 15:23

## 2017-10-15 RX ADMIN — EPHEDRINE SULFATE 10 MG: 50 INJECTION, SOLUTION INTRAVENOUS at 02:36

## 2017-10-15 RX ADMIN — OXYTOCIN-SODIUM CHLORIDE 0.9% IV SOLN 30 UNIT/500ML 3 MILLI-UNITS/MIN: 30-0.9/5 SOLUTION at 00:27

## 2017-10-15 RX ADMIN — ACETAMINOPHEN 975 MG: 325 TABLET, FILM COATED ORAL at 23:56

## 2017-10-15 RX ADMIN — ROPIVACAINE HYDROCHLORIDE 15 ML: 5 INJECTION, SOLUTION EPIDURAL; INFILTRATION; PERINEURAL at 03:14

## 2017-10-15 RX ADMIN — DOCUSATE SODIUM 100 MG: 100 CAPSULE, LIQUID FILLED ORAL at 09:24

## 2017-10-15 RX ADMIN — CEFOXITIN 1 G: 1 INJECTION, POWDER, FOR SOLUTION INTRAVENOUS at 09:24

## 2017-10-15 RX ADMIN — BUPIVACAINE HYDROCHLORIDE IN DEXTROSE 1.4 ML: 7.5 INJECTION, SOLUTION SUBARACHNOID at 02:21

## 2017-10-15 RX ADMIN — ONDANSETRON 4 MG: 2 INJECTION INTRAMUSCULAR; INTRAVENOUS at 02:36

## 2017-10-15 RX ADMIN — MAGNESIUM SULFATE IN WATER 1 G/HR: 40 INJECTION, SOLUTION INTRAVENOUS at 21:24

## 2017-10-15 RX ADMIN — DEXAMETHASONE SODIUM PHOSPHATE 5 MG: 10 INJECTION, SOLUTION INTRAMUSCULAR; INTRAVENOUS at 03:11

## 2017-10-15 RX ADMIN — SODIUM CHLORIDE, SODIUM LACTATE, POTASSIUM CHLORIDE, CALCIUM CHLORIDE AND DEXTROSE MONOHYDRATE: 5; 600; 310; 30; 20 INJECTION, SOLUTION INTRAVENOUS at 14:44

## 2017-10-15 RX ADMIN — TRANEXAMIC ACID 1 G: 100 INJECTION, SOLUTION INTRAVENOUS at 02:37

## 2017-10-15 RX ADMIN — ACETAMINOPHEN 975 MG: 325 TABLET, FILM COATED ORAL at 08:09

## 2017-10-15 RX ADMIN — MAGNESIUM SULFATE IN WATER 1 G/HR: 40 INJECTION, SOLUTION INTRAVENOUS at 04:43

## 2017-10-15 RX ADMIN — SERTRALINE HYDROCHLORIDE 50 MG: 50 TABLET, FILM COATED ORAL at 23:22

## 2017-10-15 NOTE — OP NOTE
"     Section Operative Note  [facility]    Pre-operative diagnosis: Failure to progress  Intrauterine pregnancy at 40 6/7 weeks gestation   Post-operative diagnosis: Same   Procedure: Primary low transverse  section   Surgeon: Ramez Marquez MD   Assistant(s): None   Anesthesia: Spinal anesthesia with Dilaudid and TAP Block   Estimated blood loss: 700ml   Total IV fluids: (See anesthesia record)   Blood transfusion: No transfusion was given during surgery   Total urine output: (See anesthesia record)   Drains: Moreno catheter   Specimens: placenta    Findings: Vigorous  female. \"Flora\" 8lb 10oz,  Apgar's 8/9, intact placenta with 3VC, nml pelvic anatomy   Complications: None   Condition: Mother stable, transfered to post-anesthesia recovery     Procedure Details:  The risks, benefits, complications, treatment options, and expected outcomes were discussed with the patient.  The patient concurred with the proposed plan, giving informed consent.  The patient was taken to Operating Room,  identity confirmed and the procedure verified as  Delivery. A Time Out was held and the above information confirmed.    After uneventful anesthesia placement the patient was prepped and draped in the left lateral tilt  position and a moreno catheter placed.   A low-transverse skin incision was made with a scalpel and carried down through the subcutaneous tissue to the fascia which was extended transversely. The fascia was  from the underlying rectus tissue superiorly and inferiorly. The peritoneum was identified and entered without difficulty. The utero-vesical peritoneal reflection was incised transversely and the bladder flap was bluntly freed from the lower uterine segment. A low transverse uterine incision was made.  The infant was delivered from the vtx presentation.  After the umbilical cord was clamped and cut the infant was suctioned and handed to the awaiting resuscitation " team.  Cord blood was obtained for evaluation. The placenta was removed intact and the uterus swept free of membranes and debris. The uterine incision was closed with running locked sutures of 1.0 Chromic in a 2 layer fashion.  Hemostasis was excellent. Irrigation with warm normal saline was carried out until clear.The rectus muscles were re approximated with running 0 Vicryl.   The fascia was then reapproximated with running sutures of 0 Vicryl. The subcutaneous space was irrigated and checked for hemostasis.  The skin was reapproximated with surgical staples.  Dressings were applied.   A vaginal exam was performed at the end of the procedure to evacuate the lower uterine segment and vagina of clots.  There were no complications and the patient was transferred to the recovery room in excellent and stable condition.    Instrument, sponge, and needle counts were correct prior the abdominal closure and at the conclusion of the case.         Ramez Marquez MD

## 2017-10-15 NOTE — ANESTHESIA PROCEDURE NOTES
Peripheral nerve/Neuraxial procedure note : intrathecal  Pre-Procedure    Location: OR    Procedure Times:10/15/2017 2:19 AM and 10/15/2017 2:27 AM  Pre-Anesthestic Checklist: patient identified, IV checked, site marked, risks and benefits discussed, informed consent, monitors and equipment checked, pre-op evaluation, at physician/surgeon's request and post-op pain management    Timeout  Correct Patient: Yes   Correct Procedure: Yes   Correct Site: Yes   Correct Laterality: Yes   Correct Position: Yes   Site Marked: Yes   .   Procedure Documentation  ASA 2 and Emergent  Diagnosis:stalled labor.    Procedure:    Intrathecal.  Insertion Site:L3-4  (midline approach)      Patient Prep;povidone-iodine 7.5% surgical scrub.  .  Needle: Mukesh tip Spinal Needle (gauge): 25  Spinal/LP Needle Length (inches): 3.5 # of attempts: 1 and # of redirects:  No introducer used .       Assessment/Narrative  Paresthesias: No.  .  .  clear CSF fluid removed . Time Injected: 02:21

## 2017-10-15 NOTE — PROGRESS NOTES
(S)  Felt nauseated from Mag/flushed, Mag temporarily DC'd  (O)  Vitals:    10/14/17 2338 10/14/17 2342 10/14/17 2347 10/15/17 0000   BP: 133/70 126/68 125/70 127/78   Pulse:  88     Resp:  18 18 18   Temp:       TempSrc:       SpO2:  96%     Weight:       Height:         Cervix:   Membranes: meconium stained fluid   Dilation: 8-9   Thick rim cervix.  Minimal change from my previous exam 3 hours ago     Effacement: 100%   Station:0    Fetal Heart Rate Tracing: reactive and reassuring, decelerations Occasional non repetitive  Cat:2          Tocometer: external monitor and frequency q 2-3 minutes    (A/P)  Failure to progress.    Options discussed with pt.  Recommend  delivery and pt desires to proceed.  Reviewed goals, risks, alternatives for planned procedure.  Including risk of bleeding, infection, damage to nerves, baby,  blood vessels, bowel and bladder. Discussed recovery period and expected discomfort.. All questions were answered. Consent form reviewed and signed.

## 2017-10-15 NOTE — ANESTHESIA POSTPROCEDURE EVALUATION
Patient: Valeria Thibodeaux    Procedure(s):   Section - Wound Class: II-Clean Contaminated    Diagnosis:* No pre-op diagnosis entered *  Diagnosis Additional Information: No value filed.    Anesthesia Type:  No value filed.    Note:  Anesthesia Post Evaluation    Patient location during evaluation: Bedside  Patient participation: Able to fully participate in evaluation  Level of consciousness: awake and alert  Pain management: adequate  Cardiovascular status: acceptable  Respiratory status: acceptable  Hydration status: acceptable  PONV: none             Last vitals:  Vitals:    10/15/17 0340 10/15/17 0345 10/15/17 0350   BP: 127/81 126/90 128/87   Pulse:      Resp: 14 25 24   Temp:      SpO2: 93% 94% 94%         Electronically Signed By: JOCY Kearns CRNA  October 15, 2017  3:54 AM

## 2017-10-15 NOTE — PROGRESS NOTES
(S)  C/o HA.  New onset but  not atypical for her.      (O)  Vitals:    10/14/17 1827 10/14/17 1928 10/14/17 2017 10/14/17 2030   BP: 132/76 147/88 138/91    Pulse:       Resp:       Temp:    99  F (37.2  C)   TempSrc:    Oral   SpO2:       Weight:       Height:         Cervix:   Membranes: clear amniotic fluid   Dilation: 8   Effacement: 100%   Station:0    Fetal Heart Rate Tracing: reactive and reassuring  Cat:1   Last hour except whil on back for cervix check mild FHR deceleration that resolved with position change.       Tocometer: IUPC and adequate this hour    (A/P)  Preclampsia.  BP's not severely elevated but given proteinuria, mild renal dysfunction and HA recommend  Magnesium sulfate prophylaxis.   Labor now adequate with + cervical change on pitocin augmentation.  Fetal status reassuring so will CPM.

## 2017-10-15 NOTE — ANESTHESIA CARE TRANSFER NOTE
Patient: Valeria Thibodeaux    Procedure(s):   Section - Wound Class: II-Clean Contaminated    Diagnosis: * No pre-op diagnosis entered *  Diagnosis Additional Information: No value filed.    Anesthesia Type:   No value filed.     Note:  Airway :Room Air  Patient transferred to:PACU  Handoff Report: Identifed the Patient, Identified the Reponsible Provider, Reviewed the pertinent medical history, Discussed the surgical course, Reviewed Intra-OP anesthesia mangement and issues during anesthesia, Set expectations for post-procedure period and Allowed opportunity for questions and acknowledgement of understanding      Vitals: (Last set prior to Anesthesia Care Transfer)    CRNA VITALS  10/15/2017 0245 - 10/15/2017 0331      10/15/2017             Resp Rate (set): 8                Electronically Signed By: JOCY Kearns CRNA  October 15, 2017  3:31 AM

## 2017-10-15 NOTE — PLAN OF CARE
Assessments as charted. B/P: 135/85, T: 98.8, P: 88, R: 18. Rates pain: 0/10. Incision: dressing clean, dry and intact. Voiding without difficulty. Fundus firm. Lochia: Light. Activity: remains on bedrest at this time. Wants to get up after lunch. Infant feeding: Have not witnessed a feeding yet. Attempted a breastfeed. Babe was disinterested.     Postpartum breastfeeding assessment completed and education provided, see Patient Education Activity.  Items included in the education are:     proper positioning and latch    effectiveness of feeding    manual expression    handling and storing breastmilk    maintenance of breastfeeding for the first 6 months    sign/symptoms of infant feeding issues requiring referral to qualified health care provider  Postpartum care education provided, see Patient Education activity. Patient denies needs. Will monitor.  Ilana Melo

## 2017-10-15 NOTE — ANESTHESIA PROCEDURE NOTES
Peripheral nerve/Neuraxial procedure note : TAP  Pre-Procedure    Location: OR    Procedure Times:10/15/2017 3:10 AM and 10/15/2017 3:14 AM  Pre-Anesthestic Checklist: patient identified, IV checked, site marked, risks and benefits discussed, informed consent, monitors and equipment checked, pre-op evaluation, at physician/surgeon's request and post-op pain management    Timeout  Correct Patient: Yes   Correct Procedure: Yes   Correct Site: Yes   Correct Laterality: Yes   Correct Position: Yes   Site Marked: Yes   .   Procedure Documentation    .    Procedure:    TAP.     Ultrasound used to identify targeted nerve, plexus, or vascular marker and placed a needle adjacent to it., Ultrasound was used to visualize the spread of the anesthetic in close proximity to the above stated nerve. A permanent image is entered into the patient's record.  Patient Prep;chlorhexidine gluconate and isopropyl alcohol.  .  Needle: insulated, short bevel Needle Gauge: 20.    Needle Length (Inches) 4  Insertion Method: Single Shot.       Assessment/Narrative  Paresthesias: No.  Injection made incrementally with aspirations every 5 mL..  The placement was negative for: blood aspirated, painful injection and site bleeding.  Bolus given via needle..   Secured via.   Complications: none.

## 2017-10-15 NOTE — PLAN OF CARE
Face to face report given with opportunity to observe patient.    Report given to Blanquita Melo   10/14/2017  7:22 PM

## 2017-10-15 NOTE — OR NURSING
Pt awake, alert. Denies pain. Able to wiggle toes some, but states feet are still numb. FF at the umbilicus. Scant amount of rubra drainage. Partial bed bath given. Pt cleaned up, and new lilly pad placed. Drinking sips of water and tolerating well. Skin to skin with infant. Blood pressure starting to increase at the last two five minute checks. Per preeclampsia labatelol administration protocol, blood pressure not in the range needing intervention at this time. Report given to Blanquita BEVERLY. Informed of increasing BPs. Burrows emptied for 150mls of concentrated, dark urine. Also informed nurse of this.

## 2017-10-15 NOTE — PLAN OF CARE
Assessments as charted. B/P: 111/56, T: 98.2, P: 88, R: 18. Rates pain: 4/10 Medicated with APAP and oxycodone per MAR . Incision: dressing clean, dry and intact. Voiding without difficulty. Fundus firm. Lochia: Light. Activity: Sitting at edge of bed. Tolerating well.  Infant feeding: Breast feeding fair. Will continue to assess feedings and latch.      LATCH Score:   Latch: 1 - Repeated Attempts  Audible Swallowin - Few  Type of Nipple: (Breast/Nipple) 2 - Everted  Comfort: 2 - Soft, Nontender  Hold: 0 - Full Assist   Total LATCH Score: 6  Postpartum breastfeeding assessment completed and education provided, see Patient Education Activity.  Items included in the education are:     proper positioning and latch    effectiveness of feeding    manual expression    handling and storing breastmilk    maintenance of breastfeeding for the first 6 months    sign/symptoms of infant feeding issues requiring referral to qualified health care provider  Postpartum care education provided, see Patient Education activity. Patient denies needs. Will monitor.  Ilana Melo

## 2017-10-16 LAB
ANION GAP SERPL CALCULATED.3IONS-SCNC: 6 MMOL/L (ref 3–14)
BACTERIA SPEC CULT: NO GROWTH
BUN SERPL-MCNC: 20 MG/DL (ref 7–30)
CALCIUM SERPL-MCNC: 8.2 MG/DL (ref 8.5–10.1)
CHLORIDE SERPL-SCNC: 104 MMOL/L (ref 94–109)
CO2 SERPL-SCNC: 27 MMOL/L (ref 20–32)
CREAT SERPL-MCNC: 0.85 MG/DL (ref 0.52–1.04)
ERYTHROCYTE [DISTWIDTH] IN BLOOD BY AUTOMATED COUNT: 12.8 % (ref 10–15)
GFR SERPL CREATININE-BSD FRML MDRD: 82 ML/MIN/1.7M2
GLUCOSE SERPL-MCNC: 93 MG/DL (ref 70–99)
HCT VFR BLD AUTO: 24.7 % (ref 35–47)
HGB BLD-MCNC: 8.4 G/DL (ref 11.7–15.7)
MCH RBC QN AUTO: 29.2 PG (ref 26.5–33)
MCHC RBC AUTO-ENTMCNC: 34 G/DL (ref 31.5–36.5)
MCV RBC AUTO: 86 FL (ref 78–100)
PLATELET # BLD AUTO: 155 10E9/L (ref 150–450)
POTASSIUM SERPL-SCNC: 4.1 MMOL/L (ref 3.4–5.3)
RBC # BLD AUTO: 2.88 10E12/L (ref 3.8–5.2)
SODIUM SERPL-SCNC: 137 MMOL/L (ref 133–144)
SPECIMEN SOURCE: NORMAL
WBC # BLD AUTO: 16.4 10E9/L (ref 4–11)

## 2017-10-16 PROCEDURE — 80048 BASIC METABOLIC PNL TOTAL CA: CPT | Performed by: OBSTETRICS & GYNECOLOGY

## 2017-10-16 PROCEDURE — 85027 COMPLETE CBC AUTOMATED: CPT | Performed by: OBSTETRICS & GYNECOLOGY

## 2017-10-16 PROCEDURE — 25000132 ZZH RX MED GY IP 250 OP 250 PS 637: Performed by: OBSTETRICS & GYNECOLOGY

## 2017-10-16 PROCEDURE — 36415 COLL VENOUS BLD VENIPUNCTURE: CPT | Performed by: OBSTETRICS & GYNECOLOGY

## 2017-10-16 PROCEDURE — 12000027 ZZH R&B OB

## 2017-10-16 RX ADMIN — DOCUSATE SODIUM 100 MG: 100 CAPSULE, LIQUID FILLED ORAL at 21:53

## 2017-10-16 RX ADMIN — DOCUSATE SODIUM 100 MG: 100 CAPSULE, LIQUID FILLED ORAL at 08:32

## 2017-10-16 RX ADMIN — VITAMIN A ACETATE, .BETA.-CAROTENE, ASCORBIC ACID, CHOLECALCIFEROL, .ALPHA.-TOCOPHEROL ACETATE, DL-, THIAMINE MONONITRATE, RIBOFLAVIN, NIACINAMIDE, PYRIDOXINE HYDROCHLORIDE, FOLIC ACID, CYANOCOBALAMIN, CALCIUM CARBONATE, FERROUS FUMARATE, ZINC OXIDE, AND CUPRIC OXIDE 1 TABLET: 2000; 2000; 120; 400; 22; 1.84; 3; 20; 10; 1; 12; 200; 27; 25; 2 TABLET ORAL at 08:32

## 2017-10-16 RX ADMIN — OXYCODONE HYDROCHLORIDE 5 MG: 5 TABLET ORAL at 18:45

## 2017-10-16 RX ADMIN — SERTRALINE HYDROCHLORIDE 50 MG: 50 TABLET, FILM COATED ORAL at 21:53

## 2017-10-16 RX ADMIN — OXYCODONE HYDROCHLORIDE 5 MG: 5 TABLET ORAL at 03:17

## 2017-10-16 RX ADMIN — ACETAMINOPHEN 975 MG: 325 TABLET, FILM COATED ORAL at 08:32

## 2017-10-16 RX ADMIN — OXYCODONE HYDROCHLORIDE 5 MG: 5 TABLET ORAL at 14:58

## 2017-10-16 RX ADMIN — OXYCODONE HYDROCHLORIDE 5 MG: 5 TABLET ORAL at 09:50

## 2017-10-16 RX ADMIN — OXYCODONE HYDROCHLORIDE 5 MG: 5 TABLET ORAL at 21:53

## 2017-10-16 RX ADMIN — ACETAMINOPHEN 975 MG: 325 TABLET, FILM COATED ORAL at 16:06

## 2017-10-16 NOTE — LACTATION NOTE
"Initial Lactation Consultation    Valeria Thibodeaux                                                                                                    [id number]    Consultation Date: 10/16/2017    Reason for Lactation Referral:routine lactation assessment.    MATERNAL HISTORY   Maternal History: 1st baby, unscheduled , FTP  History of Breast Surgery: No  Breast Changes During Pregnancy: Yes  Breast Feeding History: No  Maternal Meds: see eMar    MATERNAL ASSESSMENT    Breast Size: average  Nipple Appearance - Left: intact  Nipple Appearance - Right: intact  Nipple Erectility - Left: erect with stimulation  Nipple Erectility - Right: erect with stimulation  Areolas Compressibility: soft  Nipple Size: average  Milk Supply: colostrum    INFANT ASSESSMENT      Oral Anatomy  Mouth: normal  Palate: normal  Jaw: normal    FEEDING   Feeding Time:1120  Position: left breast, cradle  Effort to Latch: awake and alert, latched easily  Duration of Breast Feeding: Left Breast: 10  Results: good breast feed    FEEDING PLAN    Home Feeding Plan: Nurse on demand, responding to infant's feeding cues. Snuggle in skin-to-skin to learn positioning and infant cues. Rooming-in encouraged.    LACTATION COMMENTS   Anticipatory guidance provided in regard to \"baby's second night.\"    Link provided for [facility] Deep Latch video.  Deep latch explained for proper positioning of breast in infant's mouth, maximizing milk transfer and comfort.  Hand expression taught and return demonstration observed with colostrum present.   signs of satiety reviewed.  \"Ways to know that baby is getting enough\" discussed thoroughly.  [facility] handouts given for latch, hand expression, managing milk supply  Follow-up support information provided.        __________________________________________________________________________________  LUCIANA OROSCO RN, IBCLC  10/16/2017      "

## 2017-10-16 NOTE — PROGRESS NOTES
[facility]   Obstetrics PostPartum  / Progress Note   ppday 1 Primary C/S for FTP and atypical Pre-e  Subjective:  No complaints.   Breast feeding: yes          Medications:   All medications related to the patient's hospitalization have been reviewed           Physical Exam:   Awake alert with appropriate affect    /55  Pulse 86  Temp 98.4  F (36.9  C)  Resp 18  Ht 1.524 m (5')  Wt 72.6 kg (160 lb)  LMP 01/02/2017  SpO2 92%  BMI 31.25 kg/m2  Wound clean  Fundus : firm   Legs: neg Leisa's will still slight swelling              Data:   All laboratory data related to this ob reviewed    Hemoglobin   Date Value Ref Range Status   10/16/2017 8.4 (L) 11.7 - 15.7 g/dL Final   ]  Lab Results   Component Value Date    ABO A 10/14/2017      Lab Results   Component Value Date    RH Pos 10/14/2017            Assessment and Plan:    Assessment:   Stable PP/Post op day I Primary C/S for FTP and Atypical pre-e  Anemia mild assymptomatic  Breastfeeding  Abnormal creatinine resolved          Plan:   Ambulate 4 times daily  Prepare for discharge tomorrow             JOCY Aguero, CNM

## 2017-10-16 NOTE — PLAN OF CARE
Problem: Patient Care Overview  Goal: Plan of Care/Patient Progress Review  Outcome: Improving  Assessments as charted. B/P: 136/83, T: 99.4, P: 86, R: 18. Rates pain: 6/10, pt taking oxycodone as needing it but spreading doses out.  Pt encouraged to take medication more frequently if her pain is not well controlled, she verbalized understanding.  Plan to give the next dose sooner. Incision: Healing well, well approximated, c/d/i initially upon assessment and when pt back into bed after using the bathroom noticed a small amount of blood to the middle of incision.  No active bleeding noted.  Telfa applied and abdominal binder over. Voiding without difficulty. Fundus firm at the umbilicus. Lochia: Light. Activity: pt ambulating in the room and has walked in the waterman once this shift.  Pt encouraged to ambulate in halls at least four times/day.  Activity slightly limited due to incisional pain. Reflexes WDL, no clonus, edema noted to BLE.  Infant feeding: Breast feeding going well, with staff assistance latching baby.      LATCH Score:   Latch: 2 - Good Latch  Audible Swallowin - Spontaneous & frequent  Type of Nipple: (Breast/Nipple) 2 - Everted  Comfort: 1 - Filling, small blisters, mild/mod pain  Hold: 1 - Min. Assist   Total LATCH Score: 8     Postpartum breastfeeding assessment completed and education provided, see Patient Education Activity.  Items included in the education are:     proper positioning and latch    effectiveness of feeding    manual expression    maintenance of breastfeeding for the first 6 months    sign/symptoms of infant feeding issues requiring referral to qualified health care provider  Postpartum care education provided, see Patient Education activity. Patient denies needs. Will monitor.  Leslie Jackson

## 2017-10-16 NOTE — PLAN OF CARE
Problem: Patient Care Overview  Goal: Plan of Care/Patient Progress Review  Outcome: Improving  Assessments as charted. B/P: 114/70, T: 97.8, P: 88, R: 20. Rates pain: 2/10 general soreness in abdomen. Incision: no drainage and dressing dry and intact. Voiding without difficulty. Fundus Firma and U/U. Lochia: Scant. Activity: unrestricted with out pain  and up intermittently in room some incisional discomfort with movement and if pressure on incision when feeding infant. Infant feeding: Continues to establish breastfeeding.      LATCH Score:   Latch: 1 - Repeated Attempts  Audible Swallowin - Spontaneous & frequent  Type of Nipple: (Breast/Nipple) 2 - Everted  Comfort: 2 - Soft, Nontender  Hold: 1 - Min. Assist   Total LATCH Score: 8     Postpartum breastfeeding assessment completed and education provided, see Patient Education Activity.  Postpartum care education provided, see Patient Education activity. Patient denies needs. Will monitor.  Cee Bull

## 2017-10-16 NOTE — PROGRESS NOTES
Chart reviewed  Patient soundly sleeping after having been up most of the night so I didn't wake her.  RE will see and review with me.  Jessica To MD

## 2017-10-16 NOTE — PLAN OF CARE
Face to face report given with opportunity to observe patient.    Report given to Blanquita Melo   10/15/2017  7:21 PM

## 2017-10-17 VITALS
BODY MASS INDEX: 31.41 KG/M2 | OXYGEN SATURATION: 95 % | DIASTOLIC BLOOD PRESSURE: 85 MMHG | SYSTOLIC BLOOD PRESSURE: 139 MMHG | TEMPERATURE: 99 F | HEIGHT: 60 IN | RESPIRATION RATE: 18 BRPM | WEIGHT: 160 LBS | HEART RATE: 88 BPM

## 2017-10-17 PROCEDURE — 25000132 ZZH RX MED GY IP 250 OP 250 PS 637: Performed by: OBSTETRICS & GYNECOLOGY

## 2017-10-17 RX ORDER — AMOXICILLIN 250 MG
1-2 CAPSULE ORAL 2 TIMES DAILY PRN
Qty: 20 TABLET | Refills: 1 | Status: SHIPPED | OUTPATIENT
Start: 2017-10-17 | End: 2017-10-31

## 2017-10-17 RX ORDER — IBUPROFEN 800 MG/1
800 TABLET, FILM COATED ORAL EVERY 8 HOURS PRN
Qty: 40 TABLET | Refills: 1 | Status: SHIPPED | OUTPATIENT
Start: 2017-10-17 | End: 2017-11-28

## 2017-10-17 RX ORDER — CEPHALEXIN 500 MG/1
500 CAPSULE ORAL 3 TIMES DAILY
Qty: 21 CAPSULE | Refills: 0 | Status: SHIPPED | OUTPATIENT
Start: 2017-10-17 | End: 2017-10-24

## 2017-10-17 RX ORDER — OXYCODONE AND ACETAMINOPHEN 5; 325 MG/1; MG/1
2 TABLET ORAL EVERY 6 HOURS PRN
Qty: 40 TABLET | Refills: 0 | Status: SHIPPED | OUTPATIENT
Start: 2017-10-17 | End: 2017-10-31

## 2017-10-17 RX ORDER — FERROUS SULFATE 325(65) MG
325 TABLET ORAL 2 TIMES DAILY
Qty: 60 TABLET | Refills: 2 | Status: SHIPPED | OUTPATIENT
Start: 2017-10-17 | End: 2018-01-17

## 2017-10-17 RX ORDER — IBUPROFEN 800 MG/1
800 TABLET, FILM COATED ORAL 3 TIMES DAILY PRN
Status: DISCONTINUED | OUTPATIENT
Start: 2017-10-17 | End: 2017-10-17 | Stop reason: HOSPADM

## 2017-10-17 RX ADMIN — DOCUSATE SODIUM 100 MG: 100 CAPSULE, LIQUID FILLED ORAL at 08:30

## 2017-10-17 RX ADMIN — OXYCODONE HYDROCHLORIDE 10 MG: 5 TABLET ORAL at 06:31

## 2017-10-17 RX ADMIN — VITAMIN A ACETATE, .BETA.-CAROTENE, ASCORBIC ACID, CHOLECALCIFEROL, .ALPHA.-TOCOPHEROL ACETATE, DL-, THIAMINE MONONITRATE, RIBOFLAVIN, NIACINAMIDE, PYRIDOXINE HYDROCHLORIDE, FOLIC ACID, CYANOCOBALAMIN, CALCIUM CARBONATE, FERROUS FUMARATE, ZINC OXIDE, AND CUPRIC OXIDE 1 TABLET: 2000; 2000; 120; 400; 22; 1.84; 3; 20; 10; 1; 12; 200; 27; 25; 2 TABLET ORAL at 08:30

## 2017-10-17 RX ADMIN — IBUPROFEN 800 MG: 800 TABLET ORAL at 09:43

## 2017-10-17 RX ADMIN — OXYCODONE HYDROCHLORIDE 5 MG: 5 TABLET ORAL at 12:42

## 2017-10-17 RX ADMIN — OXYCODONE HYDROCHLORIDE 10 MG: 5 TABLET ORAL at 00:58

## 2017-10-17 RX ADMIN — ACETAMINOPHEN 975 MG: 325 TABLET, FILM COATED ORAL at 08:30

## 2017-10-17 RX ADMIN — OXYCODONE HYDROCHLORIDE 10 MG: 5 TABLET ORAL at 09:37

## 2017-10-17 RX ADMIN — ACETAMINOPHEN 975 MG: 325 TABLET, FILM COATED ORAL at 00:03

## 2017-10-17 NOTE — DISCHARGE SUMMARY
Discharge Summary    Valeria Thibodeaux MRN# [id number]   YOB: 1995 Age: 22 year old     Date of Admission:  10/14/2017  Date of Discharge:  10/17/2017  Admitting Physician:  Ramez Marquez MD  Discharge Physician:  Ramez Marquez MD  Discharging Service:  Obstetrics and Gynecology     Primary Provider: No Ref-Primary, Physician          Admission Diagnoses:   Encounter for triage in pregnant patient  Normal labor   delivery delivered          Discharge Diagnosis:     Term IUP, Delivered  Preeclampsia          Discharge Disposition:   Discharged to home           Condition on Discharge:   Discharge condition: Stable   Discharge vitals: Blood pressure 139/85, pulse 88, temperature 99  F (37.2  C), temperature source Oral, resp. rate 18, height 5' (1.524 m), weight 160 lb (72.6 kg), last menstrual period 2017, SpO2 95 %.   Code status on discharge: Full Code           Procedures / Labs / Imaging:   Primary LTCS October / 15/2016          Medications Prior to Admission:     Prescriptions Prior to Admission   Medication Sig Dispense Refill Last Dose     Prenatal Vit-Fe Fumarate-FA (PRENATAL MULTIVITAMIN PLUS IRON) 27-0.8 MG TABS per tablet Take 1 tablet by mouth daily   10/13/2017 at 2300     Sertraline HCl (ZOLOFT PO) Take 50 mg by mouth daily   10/13/2017 at 2230             Discharge Medications:     Current Discharge Medication List      START taking these medications    Details   senna-docusate (NICKIE-COLACE) 8.6-50 MG per tablet Take 1-2 tablets by mouth 2 times daily as needed for constipation  Qty: 20 tablet, Refills: 1    Associated Diagnoses:  delivery delivered      ibuprofen (ADVIL/MOTRIN) 800 MG tablet Take 1 tablet (800 mg) by mouth every 8 hours as needed for moderate pain  Qty: 40 tablet, Refills: 1    Associated Diagnoses:  delivery delivered      oxyCODONE-acetaminophen (PERCOCET) 5-325 MG per tablet Take 2 tablets by mouth every 6 hours as needed for  moderate to severe pain  Qty: 40 tablet, Refills: 0    Associated Diagnoses:  delivery delivered      ferrous sulfate (IRON) 325 (65 FE) MG tablet Take 1 tablet (325 mg) by mouth 2 times daily  Qty: 60 tablet, Refills: 2    Associated Diagnoses:  delivery delivered      cephALEXin (KEFLEX) 500 MG capsule Take 1 capsule (500 mg) by mouth 3 times daily for 7 days  Qty: 21 capsule, Refills: 0    Associated Diagnoses:  delivery delivered         CONTINUE these medications which have NOT CHANGED    Details   Prenatal Vit-Fe Fumarate-FA (PRENATAL MULTIVITAMIN PLUS IRON) 27-0.8 MG TABS per tablet Take 1 tablet by mouth daily      Sertraline HCl (ZOLOFT PO) Take 50 mg by mouth daily                   Consultations:   No consultations were requested during this admission             Brief History of Illness:   Valeria Thibodeaux is a 22 year old female who was admitted for labor at term          Hospital Course:   Primary LTCS October / 15/2016 for failure to progress.  Received Mag sulfate for 24 hours for preeclampsia with renal dysfunction which resolved.   Burrows catheter removed on pod#1 and diet advanced.  Patient was afebrile throughout hospitalization and otherwise uncomplicated postoperative course.         Significant Results:   None             Pending Results:   None           Discharge Instructions and Follow-Up:   Discharge diet: Regular   Discharge activity: Lifting restricted to 20 pounds for 6 weeks   Discharge follow-up: Follow up with Ryan Hansen CNM weeks   Wound care: Keep clean and dry   Other instructions: None

## 2017-10-17 NOTE — PLAN OF CARE
Face to face report given with opportunity to observe patient.    Report given to Blanquita BEVERLY.     Leslie Jackson   10/16/2017  7:31 PM

## 2017-10-17 NOTE — PLAN OF CARE
Problem: Patient Care Overview  Goal: Plan of Care/Patient Progress Review  Outcome: Improving  Assessments as charted. B/P: 139/85, T: 99, P: 88, R: 18. Rates pain: 5/10 pt given prn pain meds. Incision: Healing well, well approximated and without signs of infection. Voiding without difficulty. Fundus firm at U-1. Lochia: None. Activity: slightly limited due to incisional pain. Infant feeding: Breast feeding going well.      LATCH Score:   Latch: 2 - Good Latch  Audible Swallowin - Spontaneous & frequent  Type of Nipple: (Breast/Nipple) 2 - Everted  Comfort: 1 - Filling, small blisters, mild/mod pain  Hold: 2 - No Assist   Total LATCH Score: 9     Postpartum breastfeeding assessment completed and education provided, see Patient Education Activity.  Items included in the education are:     proper positioning and latch    effectiveness of feeding    manual expression    sign/symptoms of infant feeding issues requiring referral to qualified health care provider  Postpartum care education provided, see Patient Education activity. Patient denies needs. Will monitor.  Leslie Jackson

## 2017-10-17 NOTE — DISCHARGE INSTRUCTIONS
No heavy lifting greater than 20lb  for 4 weeks  No driving today or while on pain meds  Pelvic rest for 4 weeks  No vigorous activity, exercise, swim, bath for 4 weeks  Schedule  Lactation f/u 2-4 days prn.  Schedule PO stable removal Next Monday Ryan Hansen CNM  Call Dr. Marquez [phone] as necessary if problems in interim    Postop  Birth Instructions    Activity    Do not lift more than 10 pounds for 6 weeks after surgery.  Ask family and friends for help when you need it.  No driving until you have stopped taking your narcotic pain medications   No heavy exercise or activity for 6 weeks.  Don't do anything that will put a strain on your surgery site.  Don't strain when using the toilet.  Your care team may prescribe a stool softener if you have problems with your bowel movements.    To care for your incision:    Keep the incision clean and dry.  Do not soak your incision in water. No swimming or hot tubs until it has fully healed. You may soak in the bathtub if the water level is below your incision.  Do not use peroxide, gel, cream, lotion, or ointment on your incision.  Adjust your clothes to avoid pressure on your surgery site (check the elastic in your underwear for example).    You may see a small amount of clear or pink drainage and this is normal.  Check with your health care provider:    If the drainage increases or has an odor.  If the incision reddens, you have swelling, or develop a rash.  If you have increased pain and the medicine we prescribed doesn't help.  If you have a fever above 100.4 F (38 C) with or without chills when placing thermometer under your tongue.  The area around your incision (surgery wound), will feel numb.  This is normal. The numbness should go away in less than a year.     Keep your hands clean:  Always wash your hands before touching your incision (surgery wound). This helps reduce your risk of infection. If your hands aren't dirty, you may use an alcohol hand-rub  to clean your hands. Keep your nails clean and short.    Call your healthcare provider if you have any of these symptoms:    You soak a sanitary pad with blood within 1 hour, or you see blood clots larger than a golf ball.  Bleeding that lasts more than 6 weeks.  Vaginal discharge that smells bad.  Severe pain, cramping or tenderness in your lower belly area.  A need to urinate more frequently (use the toilet more often), more urgently (use the toilet very quickly), or it burns when you urinate.  Nausea and vomiting.  Redness, swelling or pain around a vein in your leg.  Problems breastfeeding or a red or painful area on your breast.  Chest pain and cough or are gasping for air.  Problems with coping with sadness, anxiety or depression. If you have concerns about hurting yourself or the baby, call your provider immediately. The Safe Place for Newborns law allows you to bring your baby to the hospital up to 7 days after birth, no questions asked.  You have questions or concerns after you return home.

## 2017-10-17 NOTE — PLAN OF CARE
Patient discharged at 12:49 PM via ambulation accompanied by daughter and mother and staff. Prescriptions sent to patients preferred pharmacy. All belongings sent with patient.     Discharge instructions reviewed with trever. Listed belongings gathered and returned to patient.     Patient discharged to home.     Core Measures and Vaccines  Core Measures applicable during stay:   Pneumonia and Influenza given prior to discharge, if indicated: No    Surgical Patient   Surgical Procedures during stay:  Section  Did patient receive discharge instruction on wound care and recognition of infection symptoms? Yes    MISC  Follow up appointment made:  Yes  Home and hospital aquired medications returned to patient: N/A  Patient reports pain was well managed at discharge: Yes  Patient reported to staff that lost camera cover.  Notified housekeeping.  Will call if found.

## 2017-10-17 NOTE — PLAN OF CARE
Problem: Postpartum ( Delivery) (Adult,Obstetrics,Pediatric)  Goal: Signs and Symptoms of Listed Potential Problems Will be Absent, Minimized or Managed (Postpartum)  Signs and symptoms of listed potential problems will be absent, minimized or managed by discharge/transition of care (reference Postpartum ( Delivery) (Adult,Obstetrics,Pediatric) CPG).   Outcome: Improving  Assessments as charted. B/P: 139/87, T: 99.8, P: 88, R: 18. Rates pain: 2/10 at incision. Incision: Healing well, well approximated, without signs of infection, no drainage and staples in place. Voiding without difficulty. Fundus Firm and U/U. Lochia: Scant. Activity: up independently in room notes some soreness with position changing and getting in and out of bed. Infant feeding: Breast feeding going well.      LATCH Score:   Latch: 2 - Good Latch  Audible Swallowin - Spontaneous & frequent  Type of Nipple: (Breast/Nipple) 2 - Everted  Comfort: 1 - Filling, small blisters, mild/mod pain  Hold: 2 - No Assist   Total LATCH Score: 9     Postpartum care education provided, see Patient Education activity. Patient denies needs. Will monitor.  Cee Bull

## 2017-10-19 ENCOUNTER — OFFICE VISIT (OUTPATIENT)
Dept: OBGYN | Facility: OTHER | Age: 22
End: 2017-10-19
Attending: ADVANCED PRACTICE MIDWIFE
Payer: COMMERCIAL

## 2017-10-19 VITALS
HEART RATE: 89 BPM | HEIGHT: 60 IN | SYSTOLIC BLOOD PRESSURE: 116 MMHG | TEMPERATURE: 97.8 F | WEIGHT: 156 LBS | BODY MASS INDEX: 30.63 KG/M2 | OXYGEN SATURATION: 98 % | DIASTOLIC BLOOD PRESSURE: 78 MMHG | RESPIRATION RATE: 20 BRPM

## 2017-10-19 DIAGNOSIS — Z71.89 ENCOUNTER FOR ANTEPARTUM CONSULTATION REGARDING LACTATION: Primary | ICD-10-CM

## 2017-10-19 PROCEDURE — 99213 OFFICE O/P EST LOW 20 MIN: CPT | Performed by: ADVANCED PRACTICE MIDWIFE

## 2017-10-19 ASSESSMENT — PAIN SCALES - GENERAL: PAINLEVEL: MILD PAIN (3)

## 2017-10-19 NOTE — MR AVS SNAPSHOT
After Visit Summary   10/19/2017    Valeria Thibodeaux    MRN: [id number]           Patient Information     Date Of Birth          1995        Visit Information        Provider Department      10/19/2017 3:30 PM Ryan Hansen APRN CNM [facility]        Today's Diagnoses     Encounter for antepartum consultation regarding lactation    -  1      Care Instructions    Return to office as needed.    Thank you for allowing Ryan SANDRA, BEST and our OB team to participate in your care.  If you have a scheduling or an appointment question please contact [facility] Unit Coordinator at their direct line [phone].   ALL nursing questions or concerns can be directed to your OB nurse at: [phone] - Laura              Follow-ups after your visit        Your next 10 appointments already scheduled     Oct 23, 2017 11:30 AM CDT   (Arrive by 11:15 AM)   Post Op with JOCY Garcia CNM   [facility] ([facility] )    [address] [address]   [phone]            Oct 25, 2017  2:00 PM CDT   Consult HOD with HI LACTATION NURSE   HI LACTATION ([facility] )    [address]  [employer] [id number] [phone]              Who to contact     If you have questions or need follow up information about today's clinic visit or your schedule please contact [facility] directly at [phone].  Normal or non-critical lab and imaging results will be communicated to you by MyChart, letter or phone within 4 business days after the clinic has received the results. If you do not hear from us within 7 days, please contact the clinic through MyChart or phone. If you have a critical or abnormal lab result, we will notify you by phone as soon as possible.  Submit refill requests through dotHIV or call your pharmacy and they will forward the refill request to us. Please allow 3 business days for your refill to be  "completed.          Additional Information About Your Visit        [employer] Information     [employer] lets you send messages to your doctor, view your test results, renew your prescriptions, schedule appointments and more. To sign up, go to [email or web address] . Click on \"Log in\" on the left side of the screen, which will take you to the Welcome page. Then click on \"Sign up Now\" on the right side of the page.     You will be asked to enter the access code listed below, as well as some personal information. Please follow the directions to create your username and password.     Your access code is: [email or web address]  Expires: 2017  4:49 PM     Your access code will  in 90 days. If you need help or a new code, please call your [place] clinic or [phone].        Care EveryWhere ID     This is your Care EveryWhere ID. This could be used by other organizations to access your [place] medical records  [id number]        Your Vitals Were     Pulse Temperature Respirations Height Last Period Pulse Oximetry    89 97.8  F (36.6  C) (Tympanic) 20 5' (1.524 m) 2017 98%    BMI (Body Mass Index)                   30.47 kg/m2            Blood Pressure from Last 3 Encounters:   10/19/17 116/78   10/17/17 139/85   10/12/17 109/70    Weight from Last 3 Encounters:   10/19/17 156 lb (70.8 kg)   10/14/17 160 lb (72.6 kg)   10/12/17 159 lb (72.1 kg)              Today, you had the following     No orders found for display       Primary Care Provider    Physician No Ref-Primary       NO REF-PRIMARY PHYSICIAN        Equal Access to Services     [facility] : Hadii rober Conner, fabiola stauffer, qaliz marcelo. So [facility] [phone].    ATENCIÓN: Si habla español, tiene a rodriguez disposición servicios gratuitos de asistencia lingüística. Llame al [phone].    We comply with applicable federal civil rights laws and [place] laws. We do not discriminate on " the basis of race, color, national origin, age, disability, sex, sexual orientation, or gender identity.            Thank you!     Thank you for choosing [facility]  for your care. Our goal is always to provide you with excellent care. Hearing back from our patients is one way we can continue to improve our services. Please take a few minutes to complete the written survey that you may receive in the mail after your visit with us. Thank you!             Your Updated Medication List - Protect others around you: Learn how to safely use, store and throw away your medicines at www.disposemymeds.org.          This list is accurate as of: 10/19/17  5:44 PM.  Always use your most recent med list.                   Brand Name Dispense Instructions for use Diagnosis    cephALEXin 500 MG capsule    KEFLEX    21 capsule    Take 1 capsule (500 mg) by mouth 3 times daily for 7 days     delivery delivered       ferrous sulfate 325 (65 FE) MG tablet    IRON    60 tablet    Take 1 tablet (325 mg) by mouth 2 times daily     delivery delivered       ibuprofen 800 MG tablet    ADVIL/MOTRIN    40 tablet    Take 1 tablet (800 mg) by mouth every 8 hours as needed for moderate pain     delivery delivered       oxyCODONE-acetaminophen 5-325 MG per tablet    PERCOCET    40 tablet    Take 2 tablets by mouth every 6 hours as needed for moderate to severe pain     delivery delivered       prenatal multivitamin plus iron 27-0.8 MG Tabs per tablet      Take 1 tablet by mouth daily        senna-docusate 8.6-50 MG per tablet    NICKIE-COLACE    20 tablet    Take 1-2 tablets by mouth 2 times daily as needed for constipation     delivery delivered       ZOLOFT PO      Take 50 mg by mouth daily

## 2017-10-19 NOTE — NURSING NOTE
Chief Complaint   Patient presents with     Lactation Consult       Initial /78 (BP Location: Left arm, Patient Position: Chair, Cuff Size: Adult Regular)  Pulse 89  Temp 97.8  F (36.6  C) (Tympanic)  Resp 20  Ht 5' (1.524 m)  Wt 156 lb (70.8 kg)  LMP 01/02/2017  SpO2 98%  BMI 30.47 kg/m2 Estimated body mass index is 30.47 kg/(m^2) as calculated from the following:    Height as of this encounter: 5' (1.524 m).    Weight as of this encounter: 156 lb (70.8 kg).  Medication Reconciliation: alexa Rodriguez

## 2017-10-19 NOTE — PROGRESS NOTES
Valeria Thibodeaux is a 22 year old female  For breastfeeding consult.  Breastfeeding going well.  Several questions and concerns addressed.  Left nipple with small crack.  Encouraged good latch every time and apply breast milk or lanolin cream after each feeding, and dry in room air.  Notify provider if signs of infection:  Red area or streak, fever, flu-like symptoms, pain.  Breast slightly engorged at this time.    Observed a feeding:  Good latch  Strong suck  Good position:  Belly to belly    Before feeding wt.:  8 lb 2 oz    After 5 minute feedin lb 3 oz      O:   /78 (BP Location: Left arm, Patient Position: Chair, Cuff Size: Adult Regular)  Pulse 89  Temp 97.8  F (36.6  C) (Tympanic)  Resp 20  Ht 5' (1.524 m)  Wt 156 lb (70.8 kg)  LMP 2017  SpO2 98%  BMI 30.47 kg/m2   Pleasant without acute distress.  Breast:  Both slightly engorged.  Left nipple with small crack.    A:  Breastfeeding 5 day old   Small crack in left nipple  Slight engorgement bilateral breast   weight gain with feeding    P:  Continue with breastfeeding  Breast care instructions/Signs of infection  RTO as needed    Greater than 15 minutes were spent face to face counseling this patient    JOCY Aguero, BEST

## 2017-10-24 ENCOUNTER — OFFICE VISIT (OUTPATIENT)
Dept: OBGYN | Facility: OTHER | Age: 22
End: 2017-10-24
Attending: ADVANCED PRACTICE MIDWIFE
Payer: COMMERCIAL

## 2017-10-24 VITALS
SYSTOLIC BLOOD PRESSURE: 120 MMHG | HEART RATE: 81 BPM | RESPIRATION RATE: 20 BRPM | DIASTOLIC BLOOD PRESSURE: 76 MMHG | HEIGHT: 60 IN | OXYGEN SATURATION: 98 %

## 2017-10-24 DIAGNOSIS — Z48.02: Primary | ICD-10-CM

## 2017-10-24 PROCEDURE — 99024 POSTOP FOLLOW-UP VISIT: CPT | Performed by: ADVANCED PRACTICE MIDWIFE

## 2017-10-24 ASSESSMENT — PAIN SCALES - GENERAL: PAINLEVEL: NO PAIN (0)

## 2017-10-24 NOTE — MR AVS SNAPSHOT
After Visit Summary   10/24/2017    Valeria Thibodeaux    MRN: [id number]           Patient Information     Date Of Birth          1995        Visit Information        Provider Department      10/24/2017 9:30 AM Ryan Hansen APRN CNM [facility]        Today's Diagnoses     Encounter for removal of staples    -  1      Care Instructions    Return to office in one week and as needed.    Thank you for allowing Ryan SANDRA CNM and our OB team to participate in your care.  If you have a scheduling or an appointment question please contact [facility] at their direct line [phone].   ALL nursing questions or concerns can be directed to your OB nurse at: [phone] - Laura              Follow-ups after your visit        Your next 10 appointments already scheduled     Oct 25, 2017  2:00 PM CDT   Consult HOD with HI LACTATION NURSE   HI LACTATION ([facility] )    [address] [facility] [address] [phone]            Oct 31, 2017 10:00 AM CDT   (Arrive by 9:45 AM)   SHORT with JOCY Garcia CNM   [facility] ([facility] )    [address] [facility] [address] [phone]              Who to contact     If you have questions or need follow up information about today's clinic visit or your schedule please contact [facility] directly at [phone].  Normal or non-critical lab and imaging results will be communicated to you by MyChart, letter or phone within 4 business days after the clinic has received the results. If you do not hear from us within 7 days, please contact the clinic through MyChart or phone. If you have a critical or abnormal lab result, we will notify you by phone as soon as possible.  Submit refill requests through PLDT or call your pharmacy and they will forward the refill request to us. Please allow 3 business days for your refill to  "be completed.          Additional Information About Your Visit        [employer] Information     [employer] lets you send messages to your doctor, view your test results, renew your prescriptions, schedule appointments and more. To sign up, go to [email or web address] . Click on \"Log in\" on the left side of the screen, which will take you to the Welcome page. Then click on \"Sign up Now\" on the right side of the page.     You will be asked to enter the access code listed below, as well as some personal information. Please follow the directions to create your username and password.     Your access code is: [email or web address]  Expires: 2017  4:49 PM     Your access code will  in 90 days. If you need help or a new code, please call your [place] clinic or [phone].        Care EveryWhere ID     This is your Care EveryWhere ID. This could be used by other organizations to access your [place] medical records  [id number]        Your Vitals Were     Pulse Respirations Height Last Period Pulse Oximetry       81 20 5' (1.524 m) 2017 98%        Blood Pressure from Last 3 Encounters:   10/24/17 120/76   10/19/17 116/78   10/17/17 139/85    Weight from Last 3 Encounters:   10/19/17 156 lb (70.8 kg)   10/14/17 160 lb (72.6 kg)   10/12/17 159 lb (72.1 kg)              We Performed the Following     SUTURE REMOVAL TRAY        Primary Care Provider    Physician No Ref-Primary       NO REF-PRIMARY PHYSICIAN        Equal Access to Services     [facility]: Hadii aad ku hadasho Soomaali, waaxda luqadaha, qaybta kaalmada adeegyada, waxay nellie arita . So [facility] [phone].    ATENCIÓN: Si habla español, tiene a rodriguez disposición servicios gratuitos de asistencia lingüística. Llame al [phone].    We comply with applicable federal civil rights laws and [place] laws. We do not discriminate on the basis of race, color, national origin, age, disability, sex, sexual orientation, or gender " identity.            Thank you!     Thank you for choosing [facility]  for your care. Our goal is always to provide you with excellent care. Hearing back from our patients is one way we can continue to improve our services. Please take a few minutes to complete the written survey that you may receive in the mail after your visit with us. Thank you!             Your Updated Medication List - Protect others around you: Learn how to safely use, store and throw away your medicines at www.disposemymeds.org.          This list is accurate as of: 10/24/17 11:53 AM.  Always use your most recent med list.                   Brand Name Dispense Instructions for use Diagnosis    cephALEXin 500 MG capsule    KEFLEX    21 capsule    Take 1 capsule (500 mg) by mouth 3 times daily for 7 days     delivery delivered       ferrous sulfate 325 (65 FE) MG tablet    IRON    60 tablet    Take 1 tablet (325 mg) by mouth 2 times daily     delivery delivered       ibuprofen 800 MG tablet    ADVIL/MOTRIN    40 tablet    Take 1 tablet (800 mg) by mouth every 8 hours as needed for moderate pain     delivery delivered       oxyCODONE-acetaminophen 5-325 MG per tablet    PERCOCET    40 tablet    Take 2 tablets by mouth every 6 hours as needed for moderate to severe pain     delivery delivered       prenatal multivitamin plus iron 27-0.8 MG Tabs per tablet      Take 1 tablet by mouth daily        senna-docusate 8.6-50 MG per tablet    NICKIE-COLACE    20 tablet    Take 1-2 tablets by mouth 2 times daily as needed for constipation     delivery delivered       ZOLOFT PO      Take 50 mg by mouth daily

## 2017-10-24 NOTE — PATIENT INSTRUCTIONS
Return to office in one week and as needed.    Thank you for allowing Ryan SANDRA CNM and our OB team to participate in your care.  If you have a scheduling or an appointment question please contact [facility] Unit Coordinator at their direct line [phone].   ALL nursing questions or concerns can be directed to your OB nurse at: [phone] - Laura

## 2017-10-24 NOTE — PROGRESS NOTES
Valeria Thibodeaux is a 22 year old female  /76 (BP Location: Left arm, Patient Position: Chair, Cuff Size: Adult Regular)  Pulse 81  Resp 20  Ht 5' (1.524 m)  LMP 2017  SpO2 98%  Pleasant without acute distress  Incision site:  Clean and well approximated.  Small amount of clear discharge noted.    Breast feeding:  y        Baby name: Flora   : n Stitches: na  Primary C/S    Bleeding:  Y, light to moderate  Vulva:  No complaint  Family planning:  abstinence  Other concerns:  No  Reports using Ibuprofen only for discomfort.  She has discontinued using Oxycodone.    Staples removed without complications.  Benzoin and steri strips placed    Assessment:    Stable Post op/PP one week  Wound site clean  Staples in place  Tolerating post op pain well with Ibuprofen    Plan:   Assess wound  Discuss pain  Staple removal and steri strip placement  RTO one week for 2 week PP care and as needed    Greater than 10 minutes in addition to procedure were spent with this patient explaining procedure and wound care.  Ryan Hansen APRN, CNM

## 2017-10-25 NOTE — PROGRESS NOTES
Fetal Non-Stress Test Results    NST Ordered By: Ryan Hansen  NST Medical Indication: establish care w/CNM    NST Start & Stop Times  NST Start Time: 1100  NST Stop Time: 1120                            NST Results  Fetus A   Baseline Rate: 130  Accelerations: Present  Decelerations: None  Interpretation: reactive

## 2017-10-27 ENCOUNTER — CARE COORDINATION (OUTPATIENT)
Dept: CARE COORDINATION | Facility: CLINIC | Age: 22
End: 2017-10-27

## 2017-10-30 ENCOUNTER — TELEPHONE (OUTPATIENT)
Dept: OBGYN | Facility: OTHER | Age: 22
End: 2017-10-30

## 2017-10-30 NOTE — TELEPHONE ENCOUNTER
Pt called about her 2 week 1 day old baby's umbilical cord.  The cord has not fallen off yet.  Denies any signs of infection such as discharge, foul smelling, redness, swelling, fever, or feeding issues.  Pt reassured that cord may take longer than 2 weeks to fall off.  Pt instructed to notify baby's healthcare provider if any sign of infection or other concerns.

## 2017-10-31 ENCOUNTER — PRENATAL OFFICE VISIT (OUTPATIENT)
Dept: OBGYN | Facility: OTHER | Age: 22
End: 2017-10-31
Attending: ADVANCED PRACTICE MIDWIFE
Payer: COMMERCIAL

## 2017-10-31 VITALS
WEIGHT: 128 LBS | DIASTOLIC BLOOD PRESSURE: 70 MMHG | OXYGEN SATURATION: 98 % | SYSTOLIC BLOOD PRESSURE: 104 MMHG | RESPIRATION RATE: 20 BRPM | TEMPERATURE: 97.7 F | HEIGHT: 60 IN | BODY MASS INDEX: 25.13 KG/M2 | HEART RATE: 100 BPM

## 2017-10-31 DIAGNOSIS — O14.93 PRE-ECLAMPSIA IN THIRD TRIMESTER: ICD-10-CM

## 2017-10-31 PROCEDURE — 99024 POSTOP FOLLOW-UP VISIT: CPT | Performed by: ADVANCED PRACTICE MIDWIFE

## 2017-10-31 ASSESSMENT — ANXIETY QUESTIONNAIRES
3. WORRYING TOO MUCH ABOUT DIFFERENT THINGS: NOT AT ALL
5. BEING SO RESTLESS THAT IT IS HARD TO SIT STILL: NOT AT ALL
7. FEELING AFRAID AS IF SOMETHING AWFUL MIGHT HAPPEN: NOT AT ALL
2. NOT BEING ABLE TO STOP OR CONTROL WORRYING: NOT AT ALL
4. TROUBLE RELAXING: NOT AT ALL
IF YOU CHECKED OFF ANY PROBLEMS ON THIS QUESTIONNAIRE, HOW DIFFICULT HAVE THESE PROBLEMS MADE IT FOR YOU TO DO YOUR WORK, TAKE CARE OF THINGS AT HOME, OR GET ALONG WITH OTHER PEOPLE: NOT DIFFICULT AT ALL
1. FEELING NERVOUS, ANXIOUS, OR ON EDGE: SEVERAL DAYS
GAD7 TOTAL SCORE: 1
6. BECOMING EASILY ANNOYED OR IRRITABLE: NOT AT ALL

## 2017-10-31 ASSESSMENT — PATIENT HEALTH QUESTIONNAIRE - PHQ9: SUM OF ALL RESPONSES TO PHQ QUESTIONS 1-9: 1

## 2017-10-31 ASSESSMENT — PAIN SCALES - GENERAL: PAINLEVEL: NO PAIN (0)

## 2017-10-31 NOTE — NURSING NOTE
Chief Complaint   Patient presents with     Post Partum Exam     2 wk PP        Initial /70 (BP Location: Left arm, Patient Position: Chair, Cuff Size: Adult Regular)  Pulse 100  Temp 97.7  F (36.5  C) (Tympanic)  Resp 20  Ht 5' (1.524 m)  Wt 128 lb (58.1 kg)  LMP 01/02/2017  SpO2 98%  BMI 25 kg/m2 Estimated body mass index is 25 kg/(m^2) as calculated from the following:    Height as of this encounter: 5' (1.524 m).    Weight as of this encounter: 128 lb (58.1 kg).  Medication Reconciliation: alexa Rodriguez
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
## 2017-10-31 NOTE — PATIENT INSTRUCTIONS
Return to office in 4 weeks for prenatal care and as needed.    Thank you for allowing Ryan SANDRA CNM and our OB team to participate in your care.  If you have a scheduling or an appointment question please contact [facility] Unit Coordinator at their direct line [phone].   ALL nursing questions or concerns can be directed to your OB nurse at: [phone] - Laura

## 2017-10-31 NOTE — MR AVS SNAPSHOT
After Visit Summary   10/31/2017    Valeria Thibodeaux    MRN: [id number]           Patient Information     Date Of Birth          1995        Visit Information        Provider Department      10/31/2017 10:00 AM Ryan Hansen APRN CNM [facility]        Today's Diagnoses     Pre-eclampsia in third trimester        Routine postpartum follow-up          Care Instructions    Return to office in 4 weeks for prenatal care and as needed.    Thank you for allowing Ryan SANDRA CNM and our OB team to participate in your care.  If you have a scheduling or an appointment question please contact [facility] Unit Coordinator at their direct line [phone].   ALL nursing questions or concerns can be directed to your OB nurse at: [phone] - Laura              Follow-ups after your visit        Your next 10 appointments already scheduled     Nov 28, 2017 10:00 AM CST   (Arrive by 9:45 AM)   Post Partum with JOCY Garcia CNM   [facility] ([facility] )    [address] [address] [phone]              Who to contact     If you have questions or need follow up information about today's clinic visit or your schedule please contact [facility] directly at [phone].  Normal or non-critical lab and imaging results will be communicated to you by MyChart, letter or phone within 4 business days after the clinic has received the results. If you do not hear from us within 7 days, please contact the clinic through [employer] or phone. If you have a critical or abnormal lab result, we will notify you by phone as soon as possible.  Submit refill requests through [employer] or call your pharmacy and they will forward the refill request to us. Please allow 3 business days for your refill to be completed.          Additional Information About Your Visit        [employer]trust Information     [employer] lets you send  "messages to your doctor, view your test results, renew your prescriptions, schedule appointments and more. To sign up, go to [email or web address] . Click on \"Log in\" on the left side of the screen, which will take you to the Welcome page. Then click on \"Sign up Now\" on the right side of the page.     You will be asked to enter the access code listed below, as well as some personal information. Please follow the directions to create your username and password.     Your access code is: [email or web address]  Expires: 2017  4:49 PM     Your access code will  in 90 days. If you need help or a new code, please call your [place] clinic or [phone].        Care EveryWhere ID     This is your Care EveryWhere ID. This could be used by other organizations to access your [place] medical records  [id number]        Your Vitals Were     Pulse Temperature Respirations Height Last Period Pulse Oximetry    100 97.7  F (36.5  C) (Tympanic) 20 5' (1.524 m) 2017 98%    BMI (Body Mass Index)                   25 kg/m2            Blood Pressure from Last 3 Encounters:   10/31/17 104/70   10/24/17 120/76   10/19/17 116/78    Weight from Last 3 Encounters:   10/31/17 128 lb (58.1 kg)   10/19/17 156 lb (70.8 kg)   10/14/17 160 lb (72.6 kg)              Today, you had the following     No orders found for display       Primary Care Provider    Physician No Ref-Primary       NO REF-PRIMARY PHYSICIAN        Equal Access to Services     [facility]: Hadii rober zimmermano Sosumit, waaxda luqadaha, qaybta kaalmada liz patel . So [facility] [phone].    ATENCIÓN: Si habla español, tiene a rodriguez disposición servicios gratuitos de asistencia lingüística. Llame al [phone].    We comply with applicable federal civil rights laws and [place] laws. We do not discriminate on the basis of race, color, national origin, age, disability, sex, sexual orientation, or gender identity.          "   Thank you!     Thank you for choosing [facility]  for your care. Our goal is always to provide you with excellent care. Hearing back from our patients is one way we can continue to improve our services. Please take a few minutes to complete the written survey that you may receive in the mail after your visit with us. Thank you!             Your Updated Medication List - Protect others around you: Learn how to safely use, store and throw away your medicines at www.disposemymeds.org.          This list is accurate as of: 10/31/17 11:29 AM.  Always use your most recent med list.                   Brand Name Dispense Instructions for use Diagnosis    ferrous sulfate 325 (65 FE) MG tablet    IRON    60 tablet    Take 1 tablet (325 mg) by mouth 2 times daily     delivery delivered       ibuprofen 800 MG tablet    ADVIL/MOTRIN    40 tablet    Take 1 tablet (800 mg) by mouth every 8 hours as needed for moderate pain     delivery delivered       prenatal multivitamin plus iron 27-0.8 MG Tabs per tablet      Take 1 tablet by mouth daily        ZOLOFT PO      Take 50 mg by mouth daily

## 2017-11-01 ASSESSMENT — ANXIETY QUESTIONNAIRES: GAD7 TOTAL SCORE: 1

## 2017-11-28 ENCOUNTER — PRENATAL OFFICE VISIT (OUTPATIENT)
Dept: OBGYN | Facility: OTHER | Age: 22
End: 2017-11-28
Attending: ADVANCED PRACTICE MIDWIFE
Payer: COMMERCIAL

## 2017-11-28 VITALS
SYSTOLIC BLOOD PRESSURE: 106 MMHG | DIASTOLIC BLOOD PRESSURE: 72 MMHG | WEIGHT: 134 LBS | OXYGEN SATURATION: 99 % | BODY MASS INDEX: 26.31 KG/M2 | HEART RATE: 101 BPM | HEIGHT: 60 IN

## 2017-11-28 PROBLEM — O14.93 PRE-ECLAMPSIA IN THIRD TRIMESTER: Status: RESOLVED | Noted: 2017-10-15 | Resolved: 2017-11-28

## 2017-11-28 PROCEDURE — 99207 ZZC POST PARTUM EXAM: CPT | Performed by: ADVANCED PRACTICE MIDWIFE

## 2017-11-28 ASSESSMENT — ANXIETY QUESTIONNAIRES
GAD7 TOTAL SCORE: 0
1. FEELING NERVOUS, ANXIOUS, OR ON EDGE: NOT AT ALL
4. TROUBLE RELAXING: NOT AT ALL
5. BEING SO RESTLESS THAT IT IS HARD TO SIT STILL: NOT AT ALL
7. FEELING AFRAID AS IF SOMETHING AWFUL MIGHT HAPPEN: NOT AT ALL
6. BECOMING EASILY ANNOYED OR IRRITABLE: NOT AT ALL
2. NOT BEING ABLE TO STOP OR CONTROL WORRYING: NOT AT ALL
3. WORRYING TOO MUCH ABOUT DIFFERENT THINGS: NOT AT ALL

## 2017-11-28 ASSESSMENT — PAIN SCALES - GENERAL: PAINLEVEL: NO PAIN (0)

## 2017-11-28 ASSESSMENT — PATIENT HEALTH QUESTIONNAIRE - PHQ9: SUM OF ALL RESPONSES TO PHQ QUESTIONS 1-9: 1

## 2017-11-28 NOTE — MR AVS SNAPSHOT
"              After Visit Summary   11/28/2017    Valeria Thibodeaux    MRN: [id number]           Patient Information     Date Of Birth          1995        Visit Information        Provider Department      11/28/2017 10:00 AM Ryan Hansen APRN CNM [facility]        Today's Diagnoses     Routine postpartum follow-up    -  1      Care Instructions    Return for annual visit in March, 2018 and as needed.    Thank you for allowing Ryan SANDRA CNM and our OB team to participate in your care.  If you have a scheduling or an appointment question please contact [facility] Health Unit Coordinator at their direct line [phone].   ALL nursing questions or concerns can be directed to your OB nurse at: [phone] - leah              Follow-ups after your visit        Who to contact     If you have questions or need follow up information about today's clinic visit or your schedule please contact [facility] directly at [phone].  Normal or non-critical lab and imaging results will be communicated to you by MyChart, letter or phone within 4 business days after the clinic has received the results. If you do not hear from us within 7 days, please contact the clinic through [employer] or phone. If you have a critical or abnormal lab result, we will notify you by phone as soon as possible.  Submit refill requests through [employer] or call your pharmacy and they will forward the refill request to us. Please allow 3 business days for your refill to be completed.          Additional Information About Your Visit        [employer] Information     [employer] lets you send messages to your doctor, view your test results, renew your prescriptions, schedule appointments and more. To sign up, go to [email or web address] . Click on \"Log in\" on the left side of the screen, which will take you to the Welcome page. Then click on \"Sign up Now\" on the right side of the page.     You will be asked to enter " the access code listed below, as well as some personal information. Please follow the directions to create your username and password.     Your access code is: [email or web address]  Expires: 2017  3:49 PM     Your access code will  in 90 days. If you need help or a new code, please call your [place] clinic or [phone].        Care EveryWhere ID     This is your Care EveryWhere ID. This could be used by other organizations to access your [place] medical records  [id number]        Your Vitals Were     Pulse Height Last Period Pulse Oximetry BMI (Body Mass Index)       101 5' (1.524 m) 2017 99% 26.17 kg/m2        Blood Pressure from Last 3 Encounters:   17 106/72   10/31/17 104/70   10/24/17 120/76    Weight from Last 3 Encounters:   17 134 lb (60.8 kg)   10/31/17 128 lb (58.1 kg)   10/19/17 156 lb (70.8 kg)              Today, you had the following     No orders found for display       Primary Care Provider Fax #    Physician No Ref-Primary [phone]       No address on file        Equal Access to Services     [place] : Hadii rober zimmermano Dalila, waaxda luqadaha, qaybta kaalmada adeegyada, liz arita . So [facility] [phone].    ATENCIÓN: Si habla español, tiene a rodriguez disposición servicios gratuitos de asistencia lingüística. [facility] [phone].    We comply with applicable federal civil rights laws and [place] laws. We do not discriminate on the basis of race, color, national origin, age, disability, sex, sexual orientation, or gender identity.            Thank you!     Thank you for choosing [facility]  for your care. Our goal is always to provide you with excellent care. Hearing back from our patients is one way we can continue to improve our services. Please take a few minutes to complete the written survey that you may receive in the mail after your visit with us. Thank you!             Your Updated Medication List - Protect  others around you: Learn how to safely use, store and throw away your medicines at www.disposemymeds.org.          This list is accurate as of: 17 11:54 AM.  Always use your most recent med list.                   Brand Name Dispense Instructions for use Diagnosis    ferrous sulfate 325 (65 FE) MG tablet    IRON    60 tablet    Take 1 tablet (325 mg) by mouth 2 times daily     delivery delivered       prenatal multivitamin plus iron 27-0.8 MG Tabs per tablet      Take 1 tablet by mouth daily        ZOLOFT PO      Take 50 mg by mouth daily

## 2017-11-28 NOTE — NURSING NOTE
Chief Complaint   Patient presents with     Post Partum Exam     6 week        Initial /72 (BP Location: Right arm, Patient Position: Chair, Cuff Size: Adult Regular)  Pulse 101  Ht 5' (1.524 m)  Wt 134 lb (60.8 kg)  LMP 01/02/2017  SpO2 99%  BMI 26.17 kg/m2 Estimated body mass index is 26.17 kg/(m^2) as calculated from the following:    Height as of this encounter: 5' (1.524 m).    Weight as of this encounter: 134 lb (60.8 kg).  Medication Reconciliation: alexa Rodriguez

## 2017-11-28 NOTE — PATIENT INSTRUCTIONS
Return for annual visit in March, 2018 and as needed.    Thank you for allowing Ryan SANDRA CNM and our OB team to participate in your care.  If you have a scheduling or an appointment question please contact [facility] Health Unit Coordinator at their direct line [phone].   ALL nursing questions or concerns can be directed to your OB nurse at: [phone] - Laura

## 2017-11-28 NOTE — PROGRESS NOTES
Valeria Thibodeaux is a 22 year old female is 6 weeks post partum  /72 (BP Location: Right arm, Patient Position: Chair, Cuff Size: Adult Regular)  Pulse 101  Ht 5' (1.524 m)  Wt 134 lb (60.8 kg)  LMP 01/02/2017  SpO2 99%  BMI 26.17 kg/m2  Pleasant without acute distress.  Breast feeding:  y        Baby name: Flora   Spontaneous vaginal delivery: no    Primary C/S   PHQ9:  1  Bleeding:  no  Vulva:  NA  Family planning:  none  Other concerns:  no    Pelvic:  Vagina and vulva are normal; well healed, no discharge is noted.    Cervix: normal without lesions.    Uterus: mobile,  normal in size and shape without tenderness.  Adnexa: without masses or tenderness.  Incision:  clean    Assessment:    Post-op/PP 6 weeks  Family planning  Not sexually active at this time    Plan:   Pelvic exam  Depression/anxiety screening  Schedule appt for Nexplanon when needed    RTO for annual in March, 2018 and as needed.    Greater than 25 were spent with this patient  JOCY Aguero, CNM

## 2017-11-29 ASSESSMENT — ANXIETY QUESTIONNAIRES: GAD7 TOTAL SCORE: 0

## 2017-12-21 DIAGNOSIS — F32.A DEPRESSION, UNSPECIFIED DEPRESSION TYPE: Primary | ICD-10-CM

## 2018-01-17 ENCOUNTER — OFFICE VISIT (OUTPATIENT)
Dept: FAMILY MEDICINE | Facility: OTHER | Age: 23
End: 2018-01-17
Attending: NURSE PRACTITIONER
Payer: COMMERCIAL

## 2018-01-17 VITALS
HEART RATE: 98 BPM | WEIGHT: 146 LBS | DIASTOLIC BLOOD PRESSURE: 62 MMHG | BODY MASS INDEX: 28.66 KG/M2 | TEMPERATURE: 98.2 F | HEIGHT: 60 IN | SYSTOLIC BLOOD PRESSURE: 102 MMHG | OXYGEN SATURATION: 99 % | RESPIRATION RATE: 14 BRPM

## 2018-01-17 DIAGNOSIS — F41.9 ANXIETY: Primary | ICD-10-CM

## 2018-01-17 PROBLEM — O99.810 GLUCOSE INTOLERANCE OF PREGNANCY: Status: RESOLVED | Noted: 2017-09-14 | Resolved: 2018-01-17

## 2018-01-17 PROBLEM — Z34.03 SUPERVISION OF NORMAL FIRST PREGNANCY IN THIRD TRIMESTER: Status: RESOLVED | Noted: 2017-09-14 | Resolved: 2018-01-17

## 2018-01-17 PROBLEM — Z71.89 ENCOUNTER FOR ANTEPARTUM CONSULTATION REGARDING LACTATION: Status: RESOLVED | Noted: 2017-10-19 | Resolved: 2018-01-17

## 2018-01-17 PROBLEM — Z48.02: Status: RESOLVED | Noted: 2017-10-24 | Resolved: 2018-01-17

## 2018-01-17 PROBLEM — Z37.9 NORMAL LABOR: Status: RESOLVED | Noted: 2017-10-14 | Resolved: 2018-01-17

## 2018-01-17 PROBLEM — Z36.89 ENCOUNTER FOR TRIAGE IN PREGNANT PATIENT: Status: RESOLVED | Noted: 2017-09-20 | Resolved: 2018-01-17

## 2018-01-17 PROCEDURE — 36415 COLL VENOUS BLD VENIPUNCTURE: CPT | Performed by: NURSE PRACTITIONER

## 2018-01-17 PROCEDURE — 99000 SPECIMEN HANDLING OFFICE-LAB: CPT | Performed by: NURSE PRACTITIONER

## 2018-01-17 PROCEDURE — 99214 OFFICE O/P EST MOD 30 MIN: CPT | Performed by: NURSE PRACTITIONER

## 2018-01-17 PROCEDURE — 84443 ASSAY THYROID STIM HORMONE: CPT | Performed by: NURSE PRACTITIONER

## 2018-01-17 PROCEDURE — 82306 VITAMIN D 25 HYDROXY: CPT | Mod: 90 | Performed by: NURSE PRACTITIONER

## 2018-01-17 RX ORDER — ESCITALOPRAM OXALATE 10 MG/1
10 TABLET ORAL DAILY
Qty: 90 TABLET | Refills: 1 | Status: SHIPPED | OUTPATIENT
Start: 2018-01-17 | End: 2018-05-22

## 2018-01-17 ASSESSMENT — PATIENT HEALTH QUESTIONNAIRE - PHQ9
SUM OF ALL RESPONSES TO PHQ QUESTIONS 1-9: 3
5. POOR APPETITE OR OVEREATING: SEVERAL DAYS

## 2018-01-17 ASSESSMENT — ANXIETY QUESTIONNAIRES
5. BEING SO RESTLESS THAT IT IS HARD TO SIT STILL: NOT AT ALL
GAD7 TOTAL SCORE: 3
IF YOU CHECKED OFF ANY PROBLEMS ON THIS QUESTIONNAIRE, HOW DIFFICULT HAVE THESE PROBLEMS MADE IT FOR YOU TO DO YOUR WORK, TAKE CARE OF THINGS AT HOME, OR GET ALONG WITH OTHER PEOPLE: SOMEWHAT DIFFICULT
2. NOT BEING ABLE TO STOP OR CONTROL WORRYING: NOT AT ALL
1. FEELING NERVOUS, ANXIOUS, OR ON EDGE: SEVERAL DAYS
6. BECOMING EASILY ANNOYED OR IRRITABLE: NOT AT ALL
3. WORRYING TOO MUCH ABOUT DIFFERENT THINGS: SEVERAL DAYS
7. FEELING AFRAID AS IF SOMETHING AWFUL MIGHT HAPPEN: NOT AT ALL

## 2018-01-17 NOTE — NURSING NOTE
Chief Complaint   Patient presents with     [facility] Care     ELVIN     Would like Aderall restarted.       Initial /62 (BP Location: Left arm, Patient Position: Sitting, Cuff Size: Adult Regular)  Pulse 98  Temp 98.2  F (36.8  C) (Tympanic)  Resp 14  Ht 5' (1.524 m)  Wt 146 lb (66.2 kg)  SpO2 99%  BMI 28.51 kg/m2 Estimated body mass index is 28.51 kg/(m^2) as calculated from the following:    Height as of this encounter: 5' (1.524 m).    Weight as of this encounter: 146 lb (66.2 kg).  Medication Reconciliation: complete   Adela Blackmon

## 2018-01-17 NOTE — LETTER
My Depression Action Plan  Name: Valeria Thibodeaux   Date of Birth 1995  Date: 1/17/2018    My doctor: No Ref-Primary, Physician   My clinic: [facility]  [address]  [place] [address]  [phone]          GREEN    ZONE   Good Control    What it looks like:     Things are going generally well. You have normal up s and down s. You may even feel depressed from time to time, but bad moods usually last less than a day.   What you need to do:  1. Continue to care for yourself (see self care plan)  2. Check your depression survival kit and update it as needed  3. Follow your physician s recommendations including any medication.  4. Do not stop taking medication unless you consult with your physician first.           YELLOW         ZONE Getting Worse    What it looks like:     Depression is starting to interfere with your life.     It may be hard to get out of bed; you may be starting to isolate yourself from others.    Symptoms of depression are starting to last most all day and this has happened for several days.     You may have suicidal thoughts but they are not constant.   What you need to do:     1. Call your care team, your response to treatment will improve if you keep your care team informed of your progress. Yellow periods are signs an adjustment may need to be made.     2. Continue your self-care, even if you have to fake it!    3. Talk to someone in your support network    4. Open up your depression survival kit           RED    ZONE Medical Alert - Get Help    What it looks like:     Depression is seriously interfering with your life.     You may experience these or other symptoms: You can t get out of bed most days, can t work or engage in other necessary activities, you have trouble taking care of basic hygiene, or basic responsibilities, thoughts of suicide or death that will not go away, self-injurious behavior.     What you need to do:  1. Call your  care team and request a same-day appointment. If they are not available (weekends or after hours) call your local crisis line, emergency room or 911.      Electronically signed by: Adela Blackmon, January 17, 2018    Depression Self Care Plan / Survival Kit    Self-Care for Depression  Here s the deal. Your body and mind are really not as separate as most people think.  What you do and think affects how you feel and how you feel influences what you do and think. This means if you do things that people who feel good do, it will help you feel better.  Sometimes this is all it takes.  There is also a place for medication and therapy depending on how severe your depression is, so be sure to consult with your medical provider and/ or Behavioral Health Consultant if your symptoms are worsening or not improving.     In order to better manage my stress, I will:    Exercise  Get some form of exercise, every day. This will help reduce pain and release endorphins, the  feel good  chemicals in your brain. This is almost as good as taking antidepressants!  This is not the same as joining a gym and then never going! (they count on that by the way ) It can be as simple as just going for a walk or doing some gardening, anything that will get you moving.      Hygiene   Maintain good hygiene (Get out of bed in the morning, Make your bed, Brush your teeth, Take a shower, and Get dressed like you were going to work, even if you are unemployed).  If your clothes don't fit try to get ones that do.    Diet  I will strive to eat foods that are good for me, drink plenty of water, and avoid excessive sugar, caffeine, alcohol, and other mood-altering substances.  Some foods that are helpful in depression are: complex carbohydrates, B vitamins, flaxseed, fish or fish oil, fresh fruits and vegetables.    Psychotherapy  I agree to participate in Individual Therapy (if recommended).    Medication  If prescribed medications, I agree to take them.   Missing doses can result in serious side effects.  I understand that drinking alcohol, or other illicit drug use, may cause potential side effects.  I will not stop my medication abruptly without first discussing it with my provider.    Staying Connected With Others  I will stay in touch with my friends, family members, and my primary care provider/team.    Use your imagination  Be creative.  We all have a creative side; it doesn t matter if it s oil painting, sand castles, or mud pies! This will also kick up the endorphins.    Witness Beauty  (AKA stop and smell the roses) Take a look outside, even in mid-winter. Notice colors, textures. Watch the squirrels and birds.     Service to others  Be of service to others.  There is always someone else in need.  By helping others we can  get out of ourselves  and remember the really important things.  This also provides opportunities for practicing all the other parts of the program.    Humor  Laugh and be silly!  Adjust your TV habits for less news and crime-drama and more comedy.    Control your stress  Try breathing deep, massage therapy, biofeedback, and meditation. Find time to relax each day.     My support system    Clinic Contact:  Phone number:    Contact 1:  Phone number:    Contact 2:  Phone number:    Nondenominational/:  Phone number:    Therapist:  Phone number:    Local crisis center:    Phone number:    Other community support:  Phone number:

## 2018-01-17 NOTE — PROGRESS NOTES
SUBJECTIVE:   Valeria Thibodeaux is a 22 year old female who presents to clinic today for the following health issues:      Anxiety Follow-Up    Status since last visit: Worsened     Other associated symptoms:None    Complicating factors:   Significant life event: No   Current substance abuse: None  Depression symptoms: No  BETSY-7 SCORE 10/31/2017 2017 2018   Total Score 1 0 3       GAD7      ADD, was on Adderall XR 20 mg - would like to restart, which is fine, but  I would like her anxiety better controlled        Amount of exercise or physical activity: None    Problems taking medications regularly: No    Medication side effects: none    Diet: regular (no restrictions)    Problem list and histories reviewed & adjusted, as indicated.  Additional history: as documented    Patient Active Problem List   Diagnosis     ADHD (attention deficit hyperactivity disorder)     Anxiety     Past Surgical History:   Procedure Laterality Date      SECTION N/A 10/15/2017    Procedure:  SECTION;   Section;  Surgeon: Ramez Marquez MD;  Location: [facility]     surgically repaired      blocked tear duct - left     TONSILLECTOMY      tonsillitis       Social History   Substance Use Topics     Smoking status: Former Smoker     Packs/day: 0.30     Years: 5.00     Types: Cigarettes     Smokeless tobacco: Never Used      Comment: tried to quit: no     Alcohol use Yes      Comment: occasionally     Family History   Problem Relation Age of Onset     DIABETES Maternal Grandfather      HEART DISEASE Maternal Grandfather      heart disease     Myocardial Infarction Paternal Aunt      Myocardial Infarction Paternal Grandfather      cause of death     Myocardial Infarction Paternal Uncle          Current Outpatient Prescriptions   Medication Sig Dispense Refill     UNABLE TO FIND MEDICATION NAME: post partum vitamin       escitalopram (LEXAPRO) 10 MG tablet Take 1 tablet (10 mg) by mouth daily 90 tablet 1      Allergies   Allergen Reactions     Bacitracin      Neosporin-Pt. Had eye swelling as a baby, Pt. States not even sure if she still has an allergy to neosporin     Bacitracin Zinc      Neosporin     Gramicidin D      Neosporin     Neomycin Sulfate      Neosporin     Polymyxin B      Neosporin     Polymyxin B Sulfate      Neosporin     Recent Labs   Lab Test  10/16/17   0523  10/15/17   1102  10/14/17   1841   09/20/17   1050   A1C   --    --    --    --   5.6   ALT   --    --   11   --    --    CR  0.85  1.13*  1.11*   < >   --    GFRESTIMATED  82  59*  60*   < >   --    GFRESTBLACK  >90  71  73   < >   --    POTASSIUM  4.1  4.3  4.5   < >   --     < > = values in this interval not displayed.      BP Readings from Last 3 Encounters:   01/17/18 102/62   11/28/17 106/72   10/31/17 104/70    Wt Readings from Last 3 Encounters:   01/17/18 146 lb (66.2 kg)   11/28/17 134 lb (60.8 kg)   10/31/17 128 lb (58.1 kg)                  Labs reviewed in EPIC          Reviewed and updated as needed this visit by clinical staffTobacco  Allergies  Meds       Reviewed and updated as needed this visit by Provider         ROS:  Constitutional, HEENT, cardiovascular, pulmonary, gi and gu systems are negative, except as otherwise noted.      OBJECTIVE:   /62 (BP Location: Left arm, Patient Position: Sitting, Cuff Size: Adult Regular)  Pulse 98  Temp 98.2  F (36.8  C) (Tympanic)  Resp 14  Ht 5' (1.524 m)  Wt 146 lb (66.2 kg)  SpO2 99%  BMI 28.51 kg/m2  Body mass index is 28.51 kg/(m^2).     GENERAL: healthy, alert and no distress  NECK: no adenopathy, no asymmetry, masses, or scars and thyroid normal to palpation  RESP: lungs clear to auscultation - no rales, rhonchi or wheezes  CV: regular rate and rhythm, normal S1 S2, no S3 or S4, no murmur, click or rub, no peripheral edema and peripheral pulses strong  SKIN: no suspicious lesions or rashes  PSYCH: mentation appears normal, affect normal/bright      ASSESSMENT/PLAN:      1. Anxiety  - TSH with free T4 reflex  - Vitamin D Deficiency  - escitalopram (LEXAPRO) 10 MG tablet; Take 1 tablet (10 mg) by mouth daily  Dispense: 90 tablet; Refill: 1    Vitamin D3 5000 U daily    Will re-eval ADD after labs and once on lexapro      FU 1 month    Rosa Hanson NP  [facility]

## 2018-01-17 NOTE — MR AVS SNAPSHOT
"              After Visit Summary   1/17/2018    Valeria Thibodeaux    MRN: [id number]           Patient Information     Date Of Birth          1995        Visit Information        Provider Department      1/17/2018 2:00 PM Rosa Hanson NP [facility]        Today's Diagnoses     Anxiety    -  1      Care Instructions        ASSESSMENT/PLAN:     1. Anxiety  - TSH with free T4 reflex  - Vitamin D Deficiency  - escitalopram (LEXAPRO) 10 MG tablet; Take 1 tablet (10 mg) by mouth daily  Dispense: 90 tablet; Refill: 1    Vitamin D3 5000 U daily      FU 1 month    Rosa Hanson NP  [facility]            Follow-ups after your visit        Who to contact     If you have questions or need follow up information about today's clinic visit or your schedule please contact [facility] directly at [phone].  Normal or non-critical lab and imaging results will be communicated to you by MyChart, letter or phone within 4 business days after the clinic has received the results. If you do not hear from us within 7 days, please contact the clinic through MyChart or phone. If you have a critical or abnormal lab result, we will notify you by phone as soon as possible.  Submit refill requests through [facility] or call your pharmacy and they will forward the refill request to us. Please allow 3 business days for your refill to be completed.          Additional Information About Your Visit        MyChart Information     [facility] lets you send messages to your doctor, view your test results, renew your prescriptions, schedule appointments and more. To sign up, go to [email or web address] . Click on \"Log in\" on the left side of the screen, which will take you to the Welcome page. Then click on \"Sign up Now\" on the right side of the page.     You will be asked to enter the access code listed below, as well as some personal information. Please follow the directions to create your username and " password.     Your access code is: [email or web address]  Expires: 2018  2:53 PM     Your access code will  in 90 days. If you need help or a new code, please call your [facility] clinic or [phone].        Care EveryWhere ID     This is your Care EveryWhere ID. This could be used by other organizations to access your [facility] medical records  [id number]        Your Vitals Were     Pulse Temperature Respirations Height Pulse Oximetry BMI (Body Mass Index)    98 98.2  F (36.8  C) (Tympanic) 14 5' (1.524 m) 99% 28.51 kg/m2       Blood Pressure from Last 3 Encounters:   18 102/62   17 106/72   10/31/17 104/70    Weight from Last 3 Encounters:   18 146 lb (66.2 kg)   17 134 lb (60.8 kg)   10/31/17 128 lb (58.1 kg)              We Performed the Following     TSH with free T4 reflex     Vitamin D Deficiency          Today's Medication Changes          These changes are accurate as of: 18  2:53 PM.  If you have any questions, ask your nurse or doctor.               Start taking these medicines.        Dose/Directions    escitalopram 10 MG tablet   Commonly known as:  LEXAPRO   Used for:  Anxiety   Started by:  Rosa Hanson NP        Dose:  10 mg   Take 1 tablet (10 mg) by mouth daily   Quantity:  90 tablet   Refills:  1         Stop taking these medicines if you haven't already. Please contact your care team if you have questions.     sertraline 50 MG tablet   Commonly known as:  ZOLOFT   Stopped by:  Rosa Hanson NP                Where to get your medicines      These medications were sent to [id number] - [place]  [address] AT [facility] [address]   [address], [place] [address]     Phone:  [phone]     escitalopram 10 MG tablet                Primary Care Provider Fax #    Physician No Ref-Primary [phone]       No address on file        Equal Access to Services     [place] AH: fabiola Donaldson,  alfredo gonzalezclarissarose marie pinedojaime yordanin hayaan adeeg kharash la'aan ah. So [facility] [phone].    ATENCIÓN: Si vijay cummins, tiene a rodriguez disposición servicios gratuitos de asistencia lingüística. Caleb al [phone].    We comply with applicable federal civil rights laws and [place] laws. We do not discriminate on the basis of race, color, national origin, age, disability, sex, sexual orientation, or gender identity.            Thank you!     Thank you for choosing [facility]  for your care. Our goal is always to provide you with excellent care. Hearing back from our patients is one way we can continue to improve our services. Please take a few minutes to complete the written survey that you may receive in the mail after your visit with us. Thank you!             Your Updated Medication List - Protect others around you: Learn how to safely use, store and throw away your medicines at www.disposemymeds.org.          This list is accurate as of: 1/17/18  2:53 PM.  Always use your most recent med list.                   Brand Name Dispense Instructions for use Diagnosis    escitalopram 10 MG tablet    LEXAPRO    90 tablet    Take 1 tablet (10 mg) by mouth daily    Anxiety       UNABLE TO FIND      MEDICATION NAME: post partum vitamin

## 2018-01-17 NOTE — PATIENT INSTRUCTIONS
ASSESSMENT/PLAN:     1. Anxiety  - TSH with free T4 reflex  - Vitamin D Deficiency  - escitalopram (LEXAPRO) 10 MG tablet; Take 1 tablet (10 mg) by mouth daily  Dispense: 90 tablet; Refill: 1    Vitamin D3 5000 U daily      FU 1 month    Rosa Hanson NP  [facility]

## 2018-01-18 LAB — TSH SERPL DL<=0.005 MIU/L-ACNC: 1.03 MU/L (ref 0.4–4)

## 2018-01-19 LAB — DEPRECATED CALCIDIOL+CALCIFEROL SERPL-MC: 25 UG/L (ref 20–75)

## 2018-01-19 ASSESSMENT — ANXIETY QUESTIONNAIRES: GAD7 TOTAL SCORE: 3

## 2018-01-30 ENCOUNTER — TELEPHONE (OUTPATIENT)
Dept: FAMILY MEDICINE | Facility: OTHER | Age: 23
End: 2018-01-30

## 2018-01-30 DIAGNOSIS — F98.8 ATTENTION DEFICIT DISORDER (ADD) WITHOUT HYPERACTIVITY: Primary | ICD-10-CM

## 2018-01-30 RX ORDER — DEXTROAMPHETAMINE SACCHARATE, AMPHETAMINE ASPARTATE MONOHYDRATE, DEXTROAMPHETAMINE SULFATE AND AMPHETAMINE SULFATE 5; 5; 5; 5 MG/1; MG/1; MG/1; MG/1
20 CAPSULE, EXTENDED RELEASE ORAL DAILY
Qty: 30 CAPSULE | Refills: 0 | Status: SHIPPED | OUTPATIENT
Start: 2018-01-30 | End: 2018-02-27

## 2018-01-30 NOTE — TELEPHONE ENCOUNTER
Adderall xr 20mg prescribed for 1 month  FU in 1 month prior to next refill to see how she is doing    [facility]  [phone]

## 2018-02-27 ENCOUNTER — OFFICE VISIT (OUTPATIENT)
Dept: FAMILY MEDICINE | Facility: OTHER | Age: 23
End: 2018-02-27
Attending: NURSE PRACTITIONER
Payer: COMMERCIAL

## 2018-02-27 ENCOUNTER — OFFICE VISIT (OUTPATIENT)
Dept: OBGYN | Facility: OTHER | Age: 23
End: 2018-02-27
Attending: ADVANCED PRACTICE MIDWIFE
Payer: COMMERCIAL

## 2018-02-27 VITALS
HEART RATE: 73 BPM | BODY MASS INDEX: 27.29 KG/M2 | WEIGHT: 139 LBS | SYSTOLIC BLOOD PRESSURE: 104 MMHG | TEMPERATURE: 98.2 F | RESPIRATION RATE: 14 BRPM | HEIGHT: 60 IN | OXYGEN SATURATION: 98 % | DIASTOLIC BLOOD PRESSURE: 60 MMHG

## 2018-02-27 VITALS
SYSTOLIC BLOOD PRESSURE: 104 MMHG | HEART RATE: 73 BPM | OXYGEN SATURATION: 98 % | BODY MASS INDEX: 27.29 KG/M2 | DIASTOLIC BLOOD PRESSURE: 60 MMHG | WEIGHT: 139 LBS | HEIGHT: 60 IN

## 2018-02-27 DIAGNOSIS — M67.431 GANGLION CYST OF WRIST, RIGHT: ICD-10-CM

## 2018-02-27 DIAGNOSIS — Z30.09 BIRTH CONTROL COUNSELING: ICD-10-CM

## 2018-02-27 DIAGNOSIS — F90.0 ATTENTION DEFICIT HYPERACTIVITY DISORDER (ADHD), PREDOMINANTLY INATTENTIVE TYPE: ICD-10-CM

## 2018-02-27 DIAGNOSIS — N94.6 MENSTRUAL CRAMPS: Primary | ICD-10-CM

## 2018-02-27 DIAGNOSIS — Z30.09 ENCOUNTER FOR OTHER GENERAL COUNSELING OR ADVICE ON CONTRACEPTION: Primary | ICD-10-CM

## 2018-02-27 DIAGNOSIS — F41.9 ANXIETY: ICD-10-CM

## 2018-02-27 PROBLEM — Z30.8 ENCOUNTER FOR OTHER CONTRACEPTIVE MANAGEMENT: Status: ACTIVE | Noted: 2018-02-27

## 2018-02-27 LAB — HCG UR QL: NEGATIVE

## 2018-02-27 PROCEDURE — 99214 OFFICE O/P EST MOD 30 MIN: CPT | Performed by: NURSE PRACTITIONER

## 2018-02-27 PROCEDURE — 81025 URINE PREGNANCY TEST: CPT | Performed by: ADVANCED PRACTICE MIDWIFE

## 2018-02-27 PROCEDURE — 99213 OFFICE O/P EST LOW 20 MIN: CPT | Performed by: ADVANCED PRACTICE MIDWIFE

## 2018-02-27 RX ORDER — DEXTROAMPHETAMINE SACCHARATE, AMPHETAMINE ASPARTATE MONOHYDRATE, DEXTROAMPHETAMINE SULFATE AND AMPHETAMINE SULFATE 5; 5; 5; 5 MG/1; MG/1; MG/1; MG/1
20 CAPSULE, EXTENDED RELEASE ORAL DAILY
Qty: 30 CAPSULE | Refills: 0 | Status: SHIPPED | OUTPATIENT
Start: 2018-03-30 | End: 2019-01-30

## 2018-02-27 RX ORDER — DEXTROAMPHETAMINE SACCHARATE, AMPHETAMINE ASPARTATE MONOHYDRATE, DEXTROAMPHETAMINE SULFATE AND AMPHETAMINE SULFATE 5; 5; 5; 5 MG/1; MG/1; MG/1; MG/1
20 CAPSULE, EXTENDED RELEASE ORAL DAILY
Qty: 30 CAPSULE | Refills: 0 | Status: SHIPPED | OUTPATIENT
Start: 2018-02-27 | End: 2019-01-30

## 2018-02-27 RX ORDER — DEXTROAMPHETAMINE SACCHARATE, AMPHETAMINE ASPARTATE MONOHYDRATE, DEXTROAMPHETAMINE SULFATE AND AMPHETAMINE SULFATE 5; 5; 5; 5 MG/1; MG/1; MG/1; MG/1
20 CAPSULE, EXTENDED RELEASE ORAL DAILY
Qty: 30 CAPSULE | Refills: 0 | Status: SHIPPED | OUTPATIENT
Start: 2018-04-30 | End: 2018-05-22

## 2018-02-27 ASSESSMENT — ANXIETY QUESTIONNAIRES
7. FEELING AFRAID AS IF SOMETHING AWFUL MIGHT HAPPEN: NOT AT ALL
1. FEELING NERVOUS, ANXIOUS, OR ON EDGE: NOT AT ALL
3. WORRYING TOO MUCH ABOUT DIFFERENT THINGS: SEVERAL DAYS
6. BECOMING EASILY ANNOYED OR IRRITABLE: NOT AT ALL
4. TROUBLE RELAXING: NOT AT ALL
2. NOT BEING ABLE TO STOP OR CONTROL WORRYING: NOT AT ALL
GAD7 TOTAL SCORE: 1
5. BEING SO RESTLESS THAT IT IS HARD TO SIT STILL: NOT AT ALL
IF YOU CHECKED OFF ANY PROBLEMS ON THIS QUESTIONNAIRE, HOW DIFFICULT HAVE THESE PROBLEMS MADE IT FOR YOU TO DO YOUR WORK, TAKE CARE OF THINGS AT HOME, OR GET ALONG WITH OTHER PEOPLE: NOT DIFFICULT AT ALL

## 2018-02-27 ASSESSMENT — PAIN SCALES - GENERAL
PAINLEVEL: NO PAIN (0)
PAINLEVEL: NO PAIN (0)

## 2018-02-27 NOTE — PROGRESS NOTES
Valeria Thibodeaux is a 22 year old female  Here today for contraception counseling.  She reports she has severe cramping with her periods.  She also reports her periods are heavier since having her baby.    Discussed contraception options, effectiveness, side effects, risks and benefits.      O:   /60 (BP Location: Left arm, Patient Position: Chair, Cuff Size: Adult Regular)  Pulse 73  Ht 5' (1.524 m)  Wt 139 lb (63 kg)  SpO2 98%  BMI 27.15 kg/m2   Pleasant without acute distress    A:    Family planning  Dysmenorrhea  Regular cycle heavy periods  Desires long-term/Nexplanon or Mirena IUD    P:    Contraceptive counseling  Urine hCG qualitative today; no sexual intercourse for 7 days; repeat hCG in 7 days.  Given pamphlets for Nexplanon and Mirena    RTO in one week for follow up      Greater than 15 minutes were spent face to face counseling this patient    JOCY Aguero, CNM

## 2018-02-27 NOTE — PATIENT INSTRUCTIONS
ASSESSMENT/PLAN:     1. Attention deficit hyperactivity disorder (ADHD), predominantly inattentive type  - amphetamine-dextroamphetamine (ADDERALL XR) 20 MG per 24 hr capsule; Take 1 capsule (20 mg) by mouth daily  Dispense: 30 capsule; Refill: 0  - amphetamine-dextroamphetamine (ADDERALL XR) 20 MG per 24 hr capsule; Take 1 capsule (20 mg) by mouth daily  Dispense: 30 capsule; Refill: 0  - amphetamine-dextroamphetamine (ADDERALL XR) 20 MG per 24 hr capsule; Take 1 capsule (20 mg) by mouth daily  Dispense: 30 capsule; Refill: 0    2. Anxiety  - Continue lexapro    3. Menstrual cramps  - OB/GYN REFERRAL    4. Birth control counseling  - OB/GYN REFERRAL    5. Ganglion cyst of wrist, right  - ORTHOPEDICS ADULT REFERRAL        Rosa Hanson NP  [facility]

## 2018-02-27 NOTE — PATIENT INSTRUCTIONS
Return to office in one week for follow up.    Thank you for allowing Ryan SANDRA CNM and our OB team to participate in your care.  If you have a scheduling or an appointment question please contact [facility] Coordinator at their direct line [phone].   ALL nursing questions or concerns can be directed to your OB nurse at: [phone] - Luara

## 2018-02-27 NOTE — NURSING NOTE
Chief Complaint   Patient presents with     Depression     A.D.H.D     Contraception       Initial /60 (BP Location: Left arm, Patient Position: Chair, Cuff Size: Adult Regular)  Pulse 73  Temp 98.2  F (36.8  C) (Tympanic)  Resp 14  Ht 5' (1.524 m)  Wt 139 lb (63 kg)  SpO2 98%  BMI 27.15 kg/m2 Estimated body mass index is 27.15 kg/(m^2) as calculated from the following:    Height as of this encounter: 5' (1.524 m).    Weight as of this encounter: 139 lb (63 kg).  Medication Reconciliation: complete     Emilee Conde

## 2018-02-27 NOTE — MR AVS SNAPSHOT
"              After Visit Summary   2/27/2018    Valeria Thibodeaux    MRN: [id number]           Patient Information     Date Of Birth          1995        Visit Information        Provider Department      2/27/2018 1:30 PM Ryan Hansen APRN CNM [facility]        Today's Diagnoses     Encounter for other general counseling or advice on contraception    -  1      Care Instructions    Return to office in one week for follow up.    Thank you for allowing Ryan SANDRA CNM and our OB team to participate in your care.  If you have a scheduling or an appointment question please contact [facility] Unit Coordinator at their direct line [phone].   ALL nursing questions or concerns can be directed to your OB nurse at: [phone] - leah              Follow-ups after your visit        Who to contact     If you have questions or need follow up information about today's clinic visit or your schedule please contact [facility] directly at [phone].  Normal or non-critical lab and imaging results will be communicated to you by [facility], letter or phone within 4 business days after the clinic has received the results. If you do not hear from us within 7 days, please contact the clinic through [facility] or phone. If you have a critical or abnormal lab result, we will notify you by phone as soon as possible.  Submit refill requests through [email or web address] or call your pharmacy and they will forward the refill request to us. Please allow 3 business days for your refill to be completed.          Additional Information About Your Visit        Naverahart Information     [email or web address] lets you send messages to your doctor, view your test results, renew your prescriptions, schedule appointments and more. To sign up, go to [email or web address] . Click on \"Log in\" on the left side of the screen, which will take you to the Welcome page. Then click on \"Sign up Now\" on the right side of the page.     You " will be asked to enter the access code listed below, as well as some personal information. Please follow the directions to create your username and password.     Your access code is: [email or web address]  Expires: 2018  2:53 PM     Your access code will  in 90 days. If you need help or a new code, please call your [facility] clinic or [phone].        Care EveryWhere ID     This is your Care EveryWhere ID. This could be used by other organizations to access your [facility] medical records  [id number]        Your Vitals Were     Pulse Height Pulse Oximetry BMI (Body Mass Index)          73 5' (1.524 m) 98% 27.15 kg/m2         Blood Pressure from Last 3 Encounters:   18 104/60   18 104/60   18 102/62    Weight from Last 3 Encounters:   18 139 lb (63 kg)   18 139 lb (63 kg)   18 146 lb (66.2 kg)              We Performed the Following     HCG qualitative urine          Today's Medication Changes          These changes are accurate as of 18  2:48 PM.  If you have any questions, ask your nurse or doctor.               These medicines have changed or have updated prescriptions.        Dose/Directions    * amphetamine-dextroamphetamine 20 MG per 24 hr capsule   Commonly known as:  ADDERALL XR   This may have changed:  Another medication with the same name was added. Make sure you understand how and when to take each.   Used for:  Attention deficit hyperactivity disorder (ADHD), predominantly inattentive type   Changed by:  Rosa Hanson NP        Dose:  20 mg   Take 1 capsule (20 mg) by mouth daily   Quantity:  30 capsule   Refills:  0       * amphetamine-dextroamphetamine 20 MG per 24 hr capsule   Commonly known as:  ADDERALL XR   This may have changed:  You were already taking a medication with the same name, and this prescription was added. Make sure you understand how and when to take each.   Used for:  Attention deficit hyperactivity disorder (ADHD), predominantly  inattentive type   Changed by:  Rosa Hanson NP        Dose:  20 mg   Start taking on:  3/30/2018   Take 1 capsule (20 mg) by mouth daily   Quantity:  30 capsule   Refills:  0       * amphetamine-dextroamphetamine 20 MG per 24 hr capsule   Commonly known as:  ADDERALL XR   This may have changed:  You were already taking a medication with the same name, and this prescription was added. Make sure you understand how and when to take each.   Used for:  Attention deficit hyperactivity disorder (ADHD), predominantly inattentive type   Changed by:  Rosa Hanson NP        Dose:  20 mg   Start taking on:  4/30/2018   Take 1 capsule (20 mg) by mouth daily   Quantity:  30 capsule   Refills:  0       * Notice:  This list has 3 medication(s) that are the same as other medications prescribed for you. Read the directions carefully, and ask your doctor or other care provider to review them with you.         Where to get your medicines      Some of these will need a paper prescription and others can be bought over the counter.  Ask your nurse if you have questions.     Bring a paper prescription for each of these medications     amphetamine-dextroamphetamine 20 MG per 24 hr capsule    amphetamine-dextroamphetamine 20 MG per 24 hr capsule    amphetamine-dextroamphetamine 20 MG per 24 hr capsule                Primary Care Provider Office Phone # Fax #    Rosa Hanson [phone] [address]  [place] [address]        Equal Access to Services     [facility] Center[facility]CIE : Hadii [place] hadasho Soomaali, waaxda luqadaha, qaybta kaalmada adeegyada, liz weiss. So [facility] [phone].    ATENCIÓN: Si habla español, tiene a rodriguez disposición servicios gratuitos de asistencia lingüística. Llame al [phone].    We comply with applicable federal civil rights laws and [place] laws. We do not discriminate on the basis of race, color, national origin, age, disability, sex, sexual  orientation, or gender identity.            Thank you!     Thank you for choosing [facility]  for your care. Our goal is always to provide you with excellent care. Hearing back from our patients is one way we can continue to improve our services. Please take a few minutes to complete the written survey that you may receive in the mail after your visit with us. Thank you!             Your Updated Medication List - Protect others around you: Learn how to safely use, store and throw away your medicines at www.disposemymeds.org.          This list is accurate as of 2/27/18  2:48 PM.  Always use your most recent med list.                   Brand Name Dispense Instructions for use Diagnosis    * amphetamine-dextroamphetamine 20 MG per 24 hr capsule    ADDERALL XR    30 capsule    Take 1 capsule (20 mg) by mouth daily    Attention deficit hyperactivity disorder (ADHD), predominantly inattentive type       * amphetamine-dextroamphetamine 20 MG per 24 hr capsule   Start taking on:  3/30/2018    ADDERALL XR    30 capsule    Take 1 capsule (20 mg) by mouth daily    Attention deficit hyperactivity disorder (ADHD), predominantly inattentive type       * amphetamine-dextroamphetamine 20 MG per 24 hr capsule   Start taking on:  4/30/2018    ADDERALL XR    30 capsule    Take 1 capsule (20 mg) by mouth daily    Attention deficit hyperactivity disorder (ADHD), predominantly inattentive type       escitalopram 10 MG tablet    LEXAPRO    90 tablet    Take 1 tablet (10 mg) by mouth daily    Anxiety       UNABLE TO FIND      MEDICATION NAME: post partum vitamin        * Notice:  This list has 3 medication(s) that are the same as other medications prescribed for you. Read the directions carefully, and ask your doctor or other care provider to review them with you.

## 2018-02-27 NOTE — PROGRESS NOTES
SUBJECTIVE:   Valeria Thibodeaux is a 22 year old female who presents to clinic today for the following health issues:        Right wrist - ganglion cyst, painful    Onset: 2 years, progressively worsening, painful    Description:   Location: right wrist, dorsum  Character: Sharp and Dull ache    Intensity: moderate    Progression of Symptoms: worse    Accompanying Signs & Symptoms:  Other symptoms: radiation of pain to arm    History:   Previous similar pain: no       Precipitating factors:   Trauma or overuse: Overuse - massage therapy    Alleviating factors:  Improved by: nothing        Anxiety Followup    Status since last visit: Stable     See PHQ-9 for current symptoms.  Other associated symptoms: None    Complicating factors:   Significant life event:  No   Current substance abuse:  None  Anxiety or Panic symptoms:  No        PHQ-9 SCORE 1/17/2018 1/30/2018 2/27/2018   Total Score - - -   Total Score 3 1 1     BETSY-7 SCORE 1/17/2018 1/30/2018 2/27/2018   Total Score 3 0 1            ADD Follow up  Symptoms have been present for: Longstanding, over 1 year  Aggravating factors: No  Relieving factors:  Medication as prescribed  Recent stressors:  No  Drug or alcohol use:  No  Past medications:  Se health history  Therapy: No  Psychiatric history: ADD  Focus at home or school:  Good  Ability to sequence tasks:  Good  Weight Stable  Yes  Suicidal Ideation or plan:  No    Evident abuse or misuse of medication: No  Request for early refills: No       PHQ-9   Any Language  Suicide Assessment Five-step Evaluation and Treatment (SAFE-T)        Birth Control    Duration: NA    Description (location/character/radiation): Needing birth control    Intensity:  mild    Accompanying signs and symptoms: bad cramps with periods    History (similar episodes/previous evaluation): None    Precipitating or alleviating factors: None    Therapies tried and outcome: birth control helped before with cramps (the pill)             Amount of exercise or physical activity: 4-5 days/week for an average of greater than 60 minutes    Problems taking medications regularly: No    Medication side effects: none    Diet: regular (no restrictions)      Problem list and histories reviewed & adjusted, as indicated.  Additional history: as documented    Patient Active Problem List   Diagnosis     ADHD (attention deficit hyperactivity disorder)     Anxiety     Past Surgical History:   Procedure Laterality Date      SECTION N/A 10/15/2017    Procedure:  SECTION;   Section;  Surgeon: Ramez Marquez MD;  Location: [facility]     surgically repaired      blocked tear duct - left     TONSILLECTOMY      tonsillitis       Social History   Substance Use Topics     Smoking status: Former Smoker     Packs/day: 0.30     Years: 5.00     Types: Cigarettes     Smokeless tobacco: Never Used      Comment: tried to quit: no     Alcohol use Yes      Comment: occasionally     Family History   Problem Relation Age of Onset     DIABETES Maternal Grandfather      HEART DISEASE Maternal Grandfather      heart disease     Myocardial Infarction Paternal Aunt      Myocardial Infarction Paternal Grandfather      cause of death     Myocardial Infarction Paternal Uncle          Current Outpatient Prescriptions   Medication Sig Dispense Refill     amphetamine-dextroamphetamine (ADDERALL XR) 20 MG per 24 hr capsule Take 1 capsule (20 mg) by mouth daily 30 capsule 0     UNABLE TO FIND MEDICATION NAME: post partum vitamin       escitalopram (LEXAPRO) 10 MG tablet Take 1 tablet (10 mg) by mouth daily 90 tablet 1     Allergies   Allergen Reactions     Bacitracin      Neosporin-Pt. Had eye swelling as a baby, Pt. States not even sure if she still has an allergy to neosporin     Bacitracin Zinc      Neosporin     Gramicidin D      Neosporin     Neomycin Sulfate      Neosporin     Polymyxin B      Neosporin     Polymyxin B Sulfate      Neosporin     Recent Labs   Lab  Test  01/17/18   1507  10/16/17   0523  10/15/17   1102  10/14/17   1841   09/20/17   1050   A1C   --    --    --    --    --   5.6   ALT   --    --    --   11   --    --    CR   --   0.85  1.13*  1.11*   < >   --    GFRESTIMATED   --   82  59*  60*   < >   --    GFRESTBLACK   --   >90  71  73   < >   --    POTASSIUM   --   4.1  4.3  4.5   < >   --    TSH  1.03   --    --    --    --    --     < > = values in this interval not displayed.      BP Readings from Last 3 Encounters:   02/27/18 104/60   01/17/18 102/62   11/28/17 106/72    Wt Readings from Last 3 Encounters:   02/27/18 139 lb (63 kg)   01/17/18 146 lb (66.2 kg)   11/28/17 134 lb (60.8 kg)                  Labs reviewed in EPIC    Reviewed and updated as needed this visit by clinical staff  Tobacco  Allergies  Meds  Problems  Med Hx  Surg Hx  Fam Hx  Soc Hx        Reviewed and updated as needed this visit by Provider         ROS:  Constitutional, HEENT, cardiovascular, pulmonary, gi and gu systems are negative, except as otherwise noted.    OBJECTIVE:     /60 (BP Location: Left arm, Patient Position: Chair, Cuff Size: Adult Regular)  Pulse 73  Temp 98.2  F (36.8  C) (Tympanic)  Resp 14  Ht 5' (1.524 m)  Wt 139 lb (63 kg)  SpO2 98%  BMI 27.15 kg/m2  Body mass index is 27.15 kg/(m^2).       GENERAL: healthy, alert and no distress  EYES: Eyes grossly normal to inspection, PERRL and conjunctivae and sclerae normal  HENT: ear canals and TM's normal, nose and mouth without ulcers or lesions  NECK: no adenopathy, no asymmetry, masses, or scars and thyroid normal to palpation  RESP: lungs clear to auscultation - no rales, rhonchi or wheezes  CV: regular rate and rhythm, normal S1 S2, no S3 or S4, no murmur, click or rub, no peripheral edema and peripheral pulses strong  MS: Right wrist - ganglion cyst  SKIN: no suspicious lesions or rashes  PSYCH: as below      Mental Status Assessment:  Constitutional: awake, alert, and no apparent  distress  Appearance:   Appropriate   Eye Contact:   Good   Psychomotor Behavior: Normal   Attitude:   Cooperative   Orientation:   All  Speech   Rate / Production: Normal    Volume:  Normal   Mood:    Normal  Affect:    Appropriate   Thought Content:  Clear   Thought Form:  Coherent  Logical   Insight:    Good         Visit time:   Over 30 minutes are spent with the patient, over 50% of which was in education and counseling regarding current conditions and treatment/therapy.  Time spent with patient, including examination, face to face patient education, review of disease process, and plan of care  15 - mn  Visit Content: During our face to face time, patient education was provided regarding diagnosis, and treatment pan.  Relevant laboratory and diagnostic testing is reviewed prior to visit, and updated at this appointment as indicated  Health Maintenance - updated as appropriate.  Record review completed        ASSESSMENT/PLAN:     1. Attention deficit hyperactivity disorder (ADHD), predominantly inattentive type  - amphetamine-dextroamphetamine (ADDERALL XR) 20 MG per 24 hr capsule; Take 1 capsule (20 mg) by mouth daily  Dispense: 30 capsule; Refill: 0  - amphetamine-dextroamphetamine (ADDERALL XR) 20 MG per 24 hr capsule; Take 1 capsule (20 mg) by mouth daily  Dispense: 30 capsule; Refill: 0  - amphetamine-dextroamphetamine (ADDERALL XR) 20 MG per 24 hr capsule; Take 1 capsule (20 mg) by mouth daily  Dispense: 30 capsule; Refill: 0    2. Anxiety  - Continue lexapro    3. Menstrual cramps  - OB/GYN REFERRAL    4. Birth control counseling  - OB/GYN REFERRAL    5. Ganglion cyst of wrist, right  - ORTHOPEDICS ADULT REFERRAL        Rosa Hanson NP  [facility]

## 2018-02-27 NOTE — NURSING NOTE
Chief Complaint   Patient presents with     Contraception       Initial /60 (BP Location: Left arm, Patient Position: Chair, Cuff Size: Adult Regular)  Pulse 73  Ht 5' (1.524 m)  Wt 139 lb (63 kg)  SpO2 98%  BMI 27.15 kg/m2 Estimated body mass index is 27.15 kg/(m^2) as calculated from the following:    Height as of this encounter: 5' (1.524 m).    Weight as of this encounter: 139 lb (63 kg).  Medication Reconciliation: alexa Rodriguez

## 2018-02-27 NOTE — MR AVS SNAPSHOT
After Visit Summary   2/27/2018    Valeria Thibodeaux    MRN: [id number]           Patient Information     Date Of Birth          1995        Visit Information        Provider Department      2/27/2018 1:15 PM Rosa Hanson NP [facility]        Today's Diagnoses     Menstrual cramps    -  1    Attention deficit hyperactivity disorder (ADHD), predominantly inattentive type        Anxiety        Birth control counseling        Ganglion cyst of wrist, right          Care Instructions        ASSESSMENT/PLAN:     1. Attention deficit hyperactivity disorder (ADHD), predominantly inattentive type  - amphetamine-dextroamphetamine (ADDERALL XR) 20 MG per 24 hr capsule; Take 1 capsule (20 mg) by mouth daily  Dispense: 30 capsule; Refill: 0  - amphetamine-dextroamphetamine (ADDERALL XR) 20 MG per 24 hr capsule; Take 1 capsule (20 mg) by mouth daily  Dispense: 30 capsule; Refill: 0  - amphetamine-dextroamphetamine (ADDERALL XR) 20 MG per 24 hr capsule; Take 1 capsule (20 mg) by mouth daily  Dispense: 30 capsule; Refill: 0    2. Anxiety  - Continue lexapro    3. Menstrual cramps  - OB/GYN REFERRAL    4. Birth control counseling  - OB/GYN REFERRAL    5. Ganglion cyst of wrist, right  - ORTHOPEDICS ADULT REFERRAL        Rosa Hanson NP  [facility]          Follow-ups after your visit        Additional Services     OB/GYN REFERRAL       Your provider has referred you to:  Ryan Hansen - [facility], today - already on schedule    Please be aware that coverage of these services is subject to the terms and limitations of your health insurance plan.  Call member services at your health plan with any benefit or coverage questions.      Please bring the following with you to your appointment:    (1) Any X-Rays, CTs or MRIs which have been performed.  Contact the facility where they were done to arrange for  prior to your scheduled appointment.   (2) List of current medications   (3) This  "referral request   (4) Any documents/labs given to you for this referral            ORTHOPEDICS ADULT REFERRAL       Your provider has referred you to: Orhtopedic eval - right wrist, ganglion cyst  Please be aware that coverage of these services is subject to the terms and limitations of your health insurance plan.  Call member services at your health plan with any benefit or coverage questions.      Please bring the following to your appointment:    >>   Any x-rays, CTs or MRIs which have been performed.  Contact the facility where they were done to arrange for  prior to your scheduled appointment.    >>   List of current medications   >>   This referral request   >>   Any documents/labs given to you for this referral                  Who to contact     If you have questions or need follow up information about today's clinic visit or your schedule please contact [facility] directly at [phone].  Normal or non-critical lab and imaging results will be communicated to you by MyChart, letter or phone within 4 business days after the clinic has received the results. If you do not hear from us within 7 days, please contact the clinic through [employer] or phone. If you have a critical or abnormal lab result, we will notify you by phone as soon as possible.  Submit refill requests through [employer] or call your pharmacy and they will forward the refill request to us. Please allow 3 business days for your refill to be completed.          Additional Information About Your Visit        [employer] Information     [employer] lets you send messages to your doctor, view your test results, renew your prescriptions, schedule appointments and more. To sign up, go to [email or web address] . Click on \"Log in\" on the left side of the screen, which will take you to the Welcome page. Then click on \"Sign up Now\" on the right side of the page.     You will be asked to enter the access code listed below, as well as some " personal information. Please follow the directions to create your username and password.     Your access code is: [email or web address]  Expires: 2018  2:53 PM     Your access code will  in 90 days. If you need help or a new code, please call your [place] clinic or [phone].        Care EveryWhere ID     This is your Care EveryWhere ID. This could be used by other organizations to access your [place] medical records  [id number]        Your Vitals Were     Pulse Temperature Respirations Height Pulse Oximetry BMI (Body Mass Index)    73 98.2  F (36.8  C) (Tympanic) 14 5' (1.524 m) 98% 27.15 kg/m2       Blood Pressure from Last 3 Encounters:   18 104/60   18 104/60   18 102/62    Weight from Last 3 Encounters:   18 139 lb (63 kg)   18 139 lb (63 kg)   18 146 lb (66.2 kg)              We Performed the Following     OB/GYN REFERRAL     ORTHOPEDICS ADULT REFERRAL          Today's Medication Changes          These changes are accurate as of 18  1:45 PM.  If you have any questions, ask your nurse or doctor.               These medicines have changed or have updated prescriptions.        Dose/Directions    * amphetamine-dextroamphetamine 20 MG per 24 hr capsule   Commonly known as:  ADDERALL XR   This may have changed:  Another medication with the same name was added. Make sure you understand how and when to take each.   Used for:  Attention deficit hyperactivity disorder (ADHD), predominantly inattentive type   Changed by:  Rosa Hanson NP        Dose:  20 mg   Take 1 capsule (20 mg) by mouth daily   Quantity:  30 capsule   Refills:  0       * amphetamine-dextroamphetamine 20 MG per 24 hr capsule   Commonly known as:  ADDERALL XR   This may have changed:  You were already taking a medication with the same name, and this prescription was added. Make sure you understand how and when to take each.   Used for:  Attention deficit hyperactivity disorder (ADHD), predominantly  inattentive type   Changed by:  Rosa Hanson NP        Dose:  20 mg   Start taking on:  3/30/2018   Take 1 capsule (20 mg) by mouth daily   Quantity:  30 capsule   Refills:  0       * amphetamine-dextroamphetamine 20 MG per 24 hr capsule   Commonly known as:  ADDERALL XR   This may have changed:  You were already taking a medication with the same name, and this prescription was added. Make sure you understand how and when to take each.   Used for:  Attention deficit hyperactivity disorder (ADHD), predominantly inattentive type   Changed by:  Rosa Hanson NP        Dose:  20 mg   Start taking on:  4/30/2018   Take 1 capsule (20 mg) by mouth daily   Quantity:  30 capsule   Refills:  0       * Notice:  This list has 3 medication(s) that are the same as other medications prescribed for you. Read the directions carefully, and ask your doctor or other care provider to review them with you.         Where to get your medicines      Some of these will need a paper prescription and others can be bought over the counter.  Ask your nurse if you have questions.     Bring a paper prescription for each of these medications     amphetamine-dextroamphetamine 20 MG per 24 hr capsule    amphetamine-dextroamphetamine 20 MG per 24 hr capsule    amphetamine-dextroamphetamine 20 MG per 24 hr capsule                Primary Care Provider Office Phone # Fax #    Rosa Hanson [phone] [address]  [place] [address]        Equal Access to Services     [facility] : Hadii [place] hadasho Soomaali, waaxda luqadaha, qaybta kaalmada adeegyada, liz weiss. So [facility] [phone].    ATENCIÓN: Si habla español, tiene a rodriguez disposición servicios gratuitos de asistencia lingüística. Llame al [phone].    We comply with applicable federal civil rights laws and [place] laws. We do not discriminate on the basis of race, color, national origin, age, disability, sex, sexual  orientation, or gender identity.            Thank you!     Thank you for choosing [facility]  for your care. Our goal is always to provide you with excellent care. Hearing back from our patients is one way we can continue to improve our services. Please take a few minutes to complete the written survey that you may receive in the mail after your visit with us. Thank you!             Your Updated Medication List - Protect others around you: Learn how to safely use, store and throw away your medicines at www.disposemymeds.org.          This list is accurate as of 2/27/18  1:45 PM.  Always use your most recent med list.                   Brand Name Dispense Instructions for use Diagnosis    * amphetamine-dextroamphetamine 20 MG per 24 hr capsule    ADDERALL XR    30 capsule    Take 1 capsule (20 mg) by mouth daily    Attention deficit hyperactivity disorder (ADHD), predominantly inattentive type       * amphetamine-dextroamphetamine 20 MG per 24 hr capsule   Start taking on:  3/30/2018    ADDERALL XR    30 capsule    Take 1 capsule (20 mg) by mouth daily    Attention deficit hyperactivity disorder (ADHD), predominantly inattentive type       * amphetamine-dextroamphetamine 20 MG per 24 hr capsule   Start taking on:  4/30/2018    ADDERALL XR    30 capsule    Take 1 capsule (20 mg) by mouth daily    Attention deficit hyperactivity disorder (ADHD), predominantly inattentive type       escitalopram 10 MG tablet    LEXAPRO    90 tablet    Take 1 tablet (10 mg) by mouth daily    Anxiety       UNABLE TO FIND      MEDICATION NAME: post partum vitamin        * Notice:  This list has 3 medication(s) that are the same as other medications prescribed for you. Read the directions carefully, and ask your doctor or other care provider to review them with you.

## 2018-02-28 ASSESSMENT — ANXIETY QUESTIONNAIRES: GAD7 TOTAL SCORE: 1

## 2018-02-28 ASSESSMENT — PATIENT HEALTH QUESTIONNAIRE - PHQ9: SUM OF ALL RESPONSES TO PHQ QUESTIONS 1-9: 1

## 2018-03-06 ENCOUNTER — OFFICE VISIT (OUTPATIENT)
Dept: OBGYN | Facility: OTHER | Age: 23
End: 2018-03-06
Attending: ADVANCED PRACTICE MIDWIFE
Payer: COMMERCIAL

## 2018-03-06 ENCOUNTER — APPOINTMENT (OUTPATIENT)
Dept: LAB | Facility: OTHER | Age: 23
End: 2018-03-06
Attending: ADVANCED PRACTICE MIDWIFE
Payer: COMMERCIAL

## 2018-03-06 VITALS
SYSTOLIC BLOOD PRESSURE: 100 MMHG | BODY MASS INDEX: 27.29 KG/M2 | HEART RATE: 79 BPM | WEIGHT: 139 LBS | OXYGEN SATURATION: 100 % | HEIGHT: 60 IN | DIASTOLIC BLOOD PRESSURE: 54 MMHG

## 2018-03-06 DIAGNOSIS — Z11.3 SCREEN FOR STD (SEXUALLY TRANSMITTED DISEASE): ICD-10-CM

## 2018-03-06 DIAGNOSIS — Z30.430 ENCOUNTER FOR INSERTION OF INTRAUTERINE CONTRACEPTIVE DEVICE (IUD): Primary | ICD-10-CM

## 2018-03-06 DIAGNOSIS — Z30.430 ENCOUNTER FOR IUD INSERTION: ICD-10-CM

## 2018-03-06 LAB
HCG UR QL: NEGATIVE
SPECIMEN SOURCE: NORMAL
WET PREP SPEC: NORMAL

## 2018-03-06 PROCEDURE — 58300 INSERT INTRAUTERINE DEVICE: CPT | Performed by: ADVANCED PRACTICE MIDWIFE

## 2018-03-06 PROCEDURE — 87491 CHLMYD TRACH DNA AMP PROBE: CPT | Performed by: ADVANCED PRACTICE MIDWIFE

## 2018-03-06 PROCEDURE — 87210 SMEAR WET MOUNT SALINE/INK: CPT | Performed by: ADVANCED PRACTICE MIDWIFE

## 2018-03-06 PROCEDURE — 81025 URINE PREGNANCY TEST: CPT | Performed by: ADVANCED PRACTICE MIDWIFE

## 2018-03-06 PROCEDURE — 87591 N.GONORRHOEAE DNA AMP PROB: CPT | Performed by: ADVANCED PRACTICE MIDWIFE

## 2018-03-06 ASSESSMENT — PAIN SCALES - GENERAL: PAINLEVEL: NO PAIN (0)

## 2018-03-06 NOTE — PROGRESS NOTES
Chief Comlaint:  IUD insertion for family planning or bleeding control  HPI:  Valeria Thibodeaux is a 23 year old female No LMP recorded.  No other complaints.  She is here for an IUD insertion. Patient has verbalized understanding of risks and benefits and has signed the consent form.          Prior ectopic n  Prior CT n    Allergies: Bacitracin; Bacitracin zinc; Gramicidin d; Neomycin sulfate; Polymyxin b; and Polymyxin b sulfate    EXAM:  Blood pressure 100/54, pulse 79, height 5' (1.524 m), weight 139 lb (63 kg), SpO2 100 %, unknown if currently breastfeeding.  General - pleasant female in no acute distress.  Pelvic - External Genitalia: normal adult female; Bartholin,urethral and [place]: within normal limits,   Vagina: well rugated, no discharge,   Cervix: no lesions or Cervical Motion Tenderness,   Uterus: Retroflexed, firm, normal sized and nontender,  Adnexae: no masses or tenderness.    PROCEDURE:  After informed consent was obtained from the patient, a speculum was placed in the vagina to visualized the cervix  Tenaculum was placed at the 12 o'clock.  The Mirena  IUD was then placed in the usual fashion.  Strings were clipped about 2-3 cm from the cervical os.  Tenaculum was removed and cervix was hemostatic. There were no complications. The patient tolerated the procedure well.    5/10 went to 0/10 immediately after.    ASSESSMENT:  Family planning  Desires Mirena IUD    PLAN:  Mirena IUD insertion    RTO in 3 weeks for IUD check    The patient should feel for the IUD strings after her next menses.  If unable to locate them, she should return to clinic for a speculum examination for confirmation that the IUD is in place. Bleeding pattern of this particular IUD was discussed with the patient. She is aware that the IUD will need to be removed in 5 years or PRN.  She is to return in 3 wks  to clinic and for her next annual or PRN.    An addition 10 minutes spent explaining procedure, risks, benefits, side  effects, effectiveness.      Ryan Hansen, APRN, CNM

## 2018-03-06 NOTE — MR AVS SNAPSHOT
After Visit Summary   3/6/2018    Valeria Thibodeaux    MRN: [id number]           Patient Information     Date Of Birth          1995        Visit Information        Provider Department      3/6/2018 11:00 AM Ryan Hansen APRN CNM [facility]        Today's Diagnoses     Encounter for insertion of intrauterine contraceptive device (IUD)    -  1    Encounter for IUD insertion        Screen for STD (sexually transmitted disease)          Care Instructions    Return to office in 3 weeks for follow up visit.    Thank you for allowing Ryan SANDRA CNM and our OB team to participate in your care.  If you have a scheduling or an appointment question please contact [facility] Health Unit Coordinator at their direct line [phone].   ALL nursing questions or concerns can be directed to your OB nurse at: [phone] - Laura              Follow-ups after your visit        Your next 10 appointments already scheduled     Mar 29, 2018  9:00 AM CDT   (Arrive by 8:45 AM)   Office Visit with JOCY Garcia CNM   [facility] ([facility] )    [address] [address] [phone]           Bring a current list of meds and any records pertaining to this visit. For Physicals, please bring immunization records and any forms needing to be filled out. Please arrive 10 minutes early to complete paperwork.              Who to contact     If you have questions or need follow up information about today's clinic visit or your schedule please contact [facility] directly at [phone].  Normal or non-critical lab and imaging results will be communicated to you by MyChart, letter or phone within 4 business days after the clinic has received the results. If you do not hear from us within 7 days, please contact the clinic through MyChart or phone. If you have a critical or abnormal lab result, we will notify you by  "phone as soon as possible.  Submit refill requests through [employer] or call your pharmacy and they will forward the refill request to us. Please allow 3 business days for your refill to be completed.          Additional Information About Your Visit        [employer] Information     [employer] lets you send messages to your doctor, view your test results, renew your prescriptions, schedule appointments and more. To sign up, go to [email or web address] . Click on \"Log in\" on the left side of the screen, which will take you to the Welcome page. Then click on \"Sign up Now\" on the right side of the page.     You will be asked to enter the access code listed below, as well as some personal information. Please follow the directions to create your username and password.     Your access code is: [email or web address]  Expires: 2018  2:53 PM     Your access code will  in 90 days. If you need help or a new code, please call your [place] clinic or [phone].        Care EveryWhere ID     This is your Care EveryWhere ID. This could be used by other organizations to access your [facility] medical records  [id number]        Your Vitals Were     Pulse Height Pulse Oximetry BMI (Body Mass Index)          79 5' (1.524 m) 100% 27.15 kg/m2         Blood Pressure from Last 3 Encounters:   18 100/54   18 104/60   18 104/60    Weight from Last 3 Encounters:   18 139 lb (63 kg)   18 139 lb (63 kg)   18 139 lb (63 kg)              We Performed the Following     GC/Chlamydia by PCR - HI,GH     HC LEVONORGESTREL IU 52MG 5 YR     HCG qualitative urine     INSERTION INTRAUTERINE DEVICE     Wet prep          Today's Medication Changes          These changes are accurate as of 3/6/18  4:57 PM.  If you have any questions, ask your nurse or doctor.               Start taking these medicines.        Dose/Directions    levonorgestrel 20 MCG/24HR IUD   Commonly known as:  MIRENA   Used for:  Encounter for insertion " of intrauterine contraceptive device (IUD)   Started by:  Ryan Hansen APRN CNM        Dose:  1 each   1 each (20 mcg) by Intrauterine route continuous   Refills:  0            Where to get your medicines      Some of these will need a paper prescription and others can be bought over the counter.  Ask your nurse if you have questions.     You don't need a prescription for these medications     levonorgestrel 20 MCG/24HR IUD                Primary Care Provider Office Phone # Fax #    Rosa HansonBERYL [phone] [address] [place]  [facility] [address]        Equal Access to Services     [facility]: Hadii [place] hadasho Soomaali, waaxda luqadaha, qaybta kaalmada akashyakevin, liz arita . So [facility] [phone].    ATENCIÓN: Si habla español, tiene a rodriguez disposición servicios gratuitos de asistencia lingüística. Llame al [phone].    We comply with applicable federal civil rights laws and [place] laws. We do not discriminate on the basis of race, color, national origin, age, disability, sex, sexual orientation, or gender identity.            Thank you!     Thank you for choosing [facility]  for your care. Our goal is always to provide you with excellent care. Hearing back from our patients is one way we can continue to improve our services. Please take a few minutes to complete the written survey that you may receive in the mail after your visit with us. Thank you!             Your Updated Medication List - Protect others around you: Learn how to safely use, store and throw away your medicines at www.disposemymeds.org.          This list is accurate as of 3/6/18  4:57 PM.  Always use your most recent med list.                   Brand Name Dispense Instructions for use Diagnosis    * amphetamine-dextroamphetamine 20 MG per 24 hr capsule    ADDERALL XR    30 capsule    Take 1 capsule (20 mg) by mouth daily    Attention deficit hyperactivity  disorder (ADHD), predominantly inattentive type       * amphetamine-dextroamphetamine 20 MG per 24 hr capsule   Start taking on:  3/30/2018    ADDERALL XR    30 capsule    Take 1 capsule (20 mg) by mouth daily    Attention deficit hyperactivity disorder (ADHD), predominantly inattentive type       * amphetamine-dextroamphetamine 20 MG per 24 hr capsule   Start taking on:  4/30/2018    ADDERALL XR    30 capsule    Take 1 capsule (20 mg) by mouth daily    Attention deficit hyperactivity disorder (ADHD), predominantly inattentive type       escitalopram 10 MG tablet    LEXAPRO    90 tablet    Take 1 tablet (10 mg) by mouth daily    Anxiety       levonorgestrel 20 MCG/24HR IUD    MIRENA     1 each (20 mcg) by Intrauterine route continuous    Encounter for insertion of intrauterine contraceptive device (IUD)       UNABLE TO FIND      MEDICATION NAME: post partum vitamin        * Notice:  This list has 3 medication(s) that are the same as other medications prescribed for you. Read the directions carefully, and ask your doctor or other care provider to review them with you.

## 2018-03-06 NOTE — NURSING NOTE
Chief Complaint   Patient presents with     IUD       Initial /54 (BP Location: Left arm, Patient Position: Chair, Cuff Size: Adult Regular)  Pulse 79  Ht 5' (1.524 m)  Wt 139 lb (63 kg)  SpO2 100%  BMI 27.15 kg/m2 Estimated body mass index is 27.15 kg/(m^2) as calculated from the following:    Height as of this encounter: 5' (1.524 m).    Weight as of this encounter: 139 lb (63 kg).  Medication Reconciliation: alexa Rodriguez

## 2018-03-06 NOTE — PATIENT INSTRUCTIONS
Return to office in 3 weeks for follow up visit.    Thank you for allowing Ryan SANDRA CNM and our OB team to participate in your care.  If you have a scheduling or an appointment question please contact [facility] Health Unit Coordinator at their direct line [phone].   ALL nursing questions or concerns can be directed to your OB nurse at: [phone] - Laura

## 2018-03-07 LAB
C TRACH DNA SPEC QL PROBE+SIG AMP: NOT DETECTED
N GONORRHOEA DNA SPEC QL PROBE+SIG AMP: NOT DETECTED
SPECIMEN SOURCE: NORMAL

## 2018-03-21 ENCOUNTER — TRANSFERRED RECORDS (OUTPATIENT)
Dept: HEALTH INFORMATION MANAGEMENT | Facility: CLINIC | Age: 23
End: 2018-03-21

## 2018-03-29 ENCOUNTER — OFFICE VISIT (OUTPATIENT)
Dept: OBGYN | Facility: OTHER | Age: 23
End: 2018-03-29
Attending: FAMILY MEDICINE
Payer: COMMERCIAL

## 2018-03-29 VITALS
BODY MASS INDEX: 27.29 KG/M2 | WEIGHT: 139 LBS | DIASTOLIC BLOOD PRESSURE: 66 MMHG | HEART RATE: 110 BPM | SYSTOLIC BLOOD PRESSURE: 106 MMHG | HEIGHT: 60 IN | OXYGEN SATURATION: 100 %

## 2018-03-29 DIAGNOSIS — Z30.431 ENCOUNTER FOR ROUTINE CHECKING OF INTRAUTERINE CONTRACEPTIVE DEVICE (IUD): Primary | ICD-10-CM

## 2018-03-29 PROCEDURE — 99213 OFFICE O/P EST LOW 20 MIN: CPT | Performed by: ADVANCED PRACTICE MIDWIFE

## 2018-03-29 ASSESSMENT — PAIN SCALES - GENERAL: PAINLEVEL: NO PAIN (0)

## 2018-03-29 NOTE — NURSING NOTE
Chief Complaint   Patient presents with     IUD     IUD check        Initial /66 (BP Location: Left arm, Patient Position: Chair, Cuff Size: Adult Regular)  Pulse 110  Ht 5' (1.524 m)  Wt 139 lb (63 kg)  SpO2 100%  BMI 27.15 kg/m2 Estimated body mass index is 27.15 kg/(m^2) as calculated from the following:    Height as of this encounter: 5' (1.524 m).    Weight as of this encounter: 139 lb (63 kg).  Medication Reconciliation: alexa Rodriguez

## 2018-03-29 NOTE — PROGRESS NOTES
Valeria Thibodeaux is a 23 year old female  Here for contraception management with IUD routine checking 3 weeks post insertion.    Pt reports occasional light bleeding/spotting.   Mild uterine cramping past 2 day (when period would have been).    Denies any sharp pain or heavy bleeding.      O:   /66 (BP Location: Left arm, Patient Position: Chair, Cuff Size: Adult Regular)  Pulse 110  Ht 5' (1.524 m)  Wt 139 lb (63 kg)  SpO2 100%  BMI 27.15 kg/m2   Pleasant without acute distress.    Pelvic:  Vagina and vulva are normal;  no discharge is noted.    Cervix: normal without lesions. IUD strings visible.   Uterus:  Mobile,  normal in size and shape without tenderness.  Adnexa: without masses or tenderness.      A:    Mirena IUD in place    P:    Pelvic exam    RTO for annual visit and as needed,    Greater than 15 minutes were spent face to face counseling this patient    Ryan Hansen, JOCY, CNM

## 2018-03-29 NOTE — PATIENT INSTRUCTIONS
Return to office for annual visit and as needed.    Thank you for allowing Ryan SANDRA CNM and our OB team to participate in your care.  If you have a scheduling or an appointment question please contact [facility] Health Unit Coordinator at their direct line [phone].   ALL nursing questions or concerns can be directed to your OB nurse at: [phone] - Laura

## 2018-03-29 NOTE — MR AVS SNAPSHOT
"              After Visit Summary   3/29/2018    Valeria Thibodeaux    MRN: [id number]           Patient Information     Date Of Birth          1995        Visit Information        Provider Department      3/29/2018 4:00 PM Ryan Hansen APRN CNM [facility]        Today's Diagnoses     Encounter for routine checking of intrauterine contraceptive device (IUD)    -  1      Care Instructions    Return to office for annual visit and as needed.    Thank you for allowing Ryan SANDRA CNM and our OB team to participate in your care.  If you have a scheduling or an appointment question please contact [facility] Unit Coordinator at their direct line [phone].   ALL nursing questions or concerns can be directed to your OB nurse at: [phone] - leah              Follow-ups after your visit        Who to contact     If you have questions or need follow up information about today's clinic visit or your schedule please contact [facility] directly at [phone].  Normal or non-critical lab and imaging results will be communicated to you by [employer], letter or phone within 4 business days after the clinic has received the results. If you do not hear from us within 7 days, please contact the clinic through [employer] or phone. If you have a critical or abnormal lab result, we will notify you by phone as soon as possible.  Submit refill requests through [employer] or call your pharmacy and they will forward the refill request to us. Please allow 3 business days for your refill to be completed.          Additional Information About Your Visit        [employer] Information     [employer] lets you send messages to your doctor, view your test results, renew your prescriptions, schedule appointments and more. To sign up, go to [email or web address] . Click on \"Log in\" on the left side of the screen, which will take you to the Welcome page. Then click on \"Sign up Now\" on the right side of the " page.     You will be asked to enter the access code listed below, as well as some personal information. Please follow the directions to create your username and password.     Your access code is: [email or web address]  Expires: 2018  3:53 PM     Your access code will  in 90 days. If you need help or a new code, please call your [place] clinic or [phone].        Care EveryWhere ID     This is your Care EveryWhere ID. This could be used by other organizations to access your [place] medical records  [id number]        Your Vitals Were     Pulse Height Pulse Oximetry BMI (Body Mass Index)          110 5' (1.524 m) 100% 27.15 kg/m2         Blood Pressure from Last 3 Encounters:   18 106/66   18 100/54   18 104/60    Weight from Last 3 Encounters:   18 139 lb (63 kg)   18 139 lb (63 kg)   18 139 lb (63 kg)              Today, you had the following     No orders found for display       Primary Care Provider Office Phone # Fax #    Rosa BERYL Hanson [phone] [address]  [place] [address]        Equal Access to Services     [place] : Hadii aad ku hadasho Soomaali, waaxda luqadaha, qaybta kaalmada adeegyada, waxay nellie haymireyan akash farris lanish . So [facility] [phone].    ATENCIÓN: Si habla español, tiene a rodriguez disposición servicios gratuitos de asistencia lingüística. Llame al [phone].    We comply with applicable federal civil rights laws and [place] laws. We do not discriminate on the basis of race, color, national origin, age, disability, sex, sexual orientation, or gender identity.            Thank you!     Thank you for choosing [facility]  for your care. Our goal is always to provide you with excellent care. Hearing back from our patients is one way we can continue to improve our services. Please take a few minutes to complete the written survey that you may receive in the mail after your visit with us. Thank  you!             Your Updated Medication List - Protect others around you: Learn how to safely use, store and throw away your medicines at www.disposemymeds.org.          This list is accurate as of 3/29/18  4:24 PM.  Always use your most recent med list.                   Brand Name Dispense Instructions for use Diagnosis    * amphetamine-dextroamphetamine 20 MG per 24 hr capsule    ADDERALL XR    30 capsule    Take 1 capsule (20 mg) by mouth daily    Attention deficit hyperactivity disorder (ADHD), predominantly inattentive type       * amphetamine-dextroamphetamine 20 MG per 24 hr capsule   Start taking on:  3/30/2018    ADDERALL XR    30 capsule    Take 1 capsule (20 mg) by mouth daily    Attention deficit hyperactivity disorder (ADHD), predominantly inattentive type       * amphetamine-dextroamphetamine 20 MG per 24 hr capsule   Start taking on:  4/30/2018    ADDERALL XR    30 capsule    Take 1 capsule (20 mg) by mouth daily    Attention deficit hyperactivity disorder (ADHD), predominantly inattentive type       escitalopram 10 MG tablet    LEXAPRO    90 tablet    Take 1 tablet (10 mg) by mouth daily    Anxiety       levonorgestrel 20 MCG/24HR IUD    MIRENA     1 each (20 mcg) by Intrauterine route continuous    Encounter for insertion of intrauterine contraceptive device (IUD)       UNABLE TO FIND      MEDICATION NAME: post partum vitamin        * Notice:  This list has 3 medication(s) that are the same as other medications prescribed for you. Read the directions carefully, and ask your doctor or other care provider to review them with you.

## 2018-05-22 ENCOUNTER — OFFICE VISIT (OUTPATIENT)
Dept: FAMILY MEDICINE | Facility: OTHER | Age: 23
End: 2018-05-22
Attending: NURSE PRACTITIONER
Payer: COMMERCIAL

## 2018-05-22 VITALS
RESPIRATION RATE: 18 BRPM | HEART RATE: 91 BPM | WEIGHT: 139 LBS | HEIGHT: 60 IN | BODY MASS INDEX: 27.29 KG/M2 | OXYGEN SATURATION: 98 % | DIASTOLIC BLOOD PRESSURE: 70 MMHG | TEMPERATURE: 98.3 F | SYSTOLIC BLOOD PRESSURE: 100 MMHG

## 2018-05-22 DIAGNOSIS — F41.9 ANXIETY: ICD-10-CM

## 2018-05-22 DIAGNOSIS — F90.0 ATTENTION DEFICIT HYPERACTIVITY DISORDER (ADHD), PREDOMINANTLY INATTENTIVE TYPE: Primary | ICD-10-CM

## 2018-05-22 PROBLEM — Z30.8 ENCOUNTER FOR OTHER CONTRACEPTIVE MANAGEMENT: Status: RESOLVED | Noted: 2018-02-27 | Resolved: 2018-05-22

## 2018-05-22 PROBLEM — Z30.430 ENCOUNTER FOR IUD INSERTION: Status: RESOLVED | Noted: 2018-03-06 | Resolved: 2018-05-22

## 2018-05-22 PROCEDURE — 99214 OFFICE O/P EST MOD 30 MIN: CPT | Performed by: NURSE PRACTITIONER

## 2018-05-22 RX ORDER — DEXTROAMPHETAMINE SACCHARATE, AMPHETAMINE ASPARTATE MONOHYDRATE, DEXTROAMPHETAMINE SULFATE AND AMPHETAMINE SULFATE 5; 5; 5; 5 MG/1; MG/1; MG/1; MG/1
20 CAPSULE, EXTENDED RELEASE ORAL DAILY
Qty: 30 CAPSULE | Refills: 0 | Status: SHIPPED | OUTPATIENT
Start: 2018-07-29 | End: 2018-08-10

## 2018-05-22 RX ORDER — DEXTROAMPHETAMINE SACCHARATE, AMPHETAMINE ASPARTATE MONOHYDRATE, DEXTROAMPHETAMINE SULFATE AND AMPHETAMINE SULFATE 5; 5; 5; 5 MG/1; MG/1; MG/1; MG/1
20 CAPSULE, EXTENDED RELEASE ORAL DAILY
Qty: 30 CAPSULE | Refills: 0 | Status: SHIPPED | OUTPATIENT
Start: 2018-06-29 | End: 2019-01-30

## 2018-05-22 RX ORDER — ESCITALOPRAM OXALATE 20 MG/1
20 TABLET ORAL DAILY
Qty: 90 TABLET | Refills: 1 | Status: SHIPPED | OUTPATIENT
Start: 2018-05-22 | End: 2018-10-23

## 2018-05-22 RX ORDER — DEXTROAMPHETAMINE SACCHARATE, AMPHETAMINE ASPARTATE MONOHYDRATE, DEXTROAMPHETAMINE SULFATE AND AMPHETAMINE SULFATE 5; 5; 5; 5 MG/1; MG/1; MG/1; MG/1
20 CAPSULE, EXTENDED RELEASE ORAL DAILY
Qty: 30 CAPSULE | Refills: 0 | Status: SHIPPED | OUTPATIENT
Start: 2018-05-30 | End: 2019-01-30

## 2018-05-22 ASSESSMENT — ANXIETY QUESTIONNAIRES
6. BECOMING EASILY ANNOYED OR IRRITABLE: NOT AT ALL
GAD7 TOTAL SCORE: 4
5. BEING SO RESTLESS THAT IT IS HARD TO SIT STILL: SEVERAL DAYS
1. FEELING NERVOUS, ANXIOUS, OR ON EDGE: SEVERAL DAYS
3. WORRYING TOO MUCH ABOUT DIFFERENT THINGS: SEVERAL DAYS
7. FEELING AFRAID AS IF SOMETHING AWFUL MIGHT HAPPEN: NOT AT ALL
IF YOU CHECKED OFF ANY PROBLEMS ON THIS QUESTIONNAIRE, HOW DIFFICULT HAVE THESE PROBLEMS MADE IT FOR YOU TO DO YOUR WORK, TAKE CARE OF THINGS AT HOME, OR GET ALONG WITH OTHER PEOPLE: NOT DIFFICULT AT ALL
2. NOT BEING ABLE TO STOP OR CONTROL WORRYING: SEVERAL DAYS

## 2018-05-22 ASSESSMENT — PATIENT HEALTH QUESTIONNAIRE - PHQ9: 5. POOR APPETITE OR OVEREATING: NOT AT ALL

## 2018-05-22 ASSESSMENT — PAIN SCALES - GENERAL: PAINLEVEL: NO PAIN (0)

## 2018-05-22 NOTE — NURSING NOTE
Chief Complaint   Patient presents with     Depression     A.D.H.D       Initial /70  Pulse 91  Temp 98.3  F (36.8  C) (Tympanic)  Resp 18  Ht 5' (1.524 m)  Wt 139 lb (63 kg)  SpO2 98%  BMI 27.15 kg/m2 Estimated body mass index is 27.15 kg/(m^2) as calculated from the following:    Height as of this encounter: 5' (1.524 m).    Weight as of this encounter: 139 lb (63 kg).  Medication Reconciliation: complete    Pooja Meek LPN

## 2018-05-22 NOTE — PATIENT INSTRUCTIONS
ASSESSMENT/PLAN:     1. Anxiety  - escitalopram (LEXAPRO) 20 MG tablet; Take 1 tablet (20 mg) by mouth daily  Dispense: 90 tablet; Refill: 1    2. Attention deficit hyperactivity disorder (ADHD), predominantly inattentive type  - amphetamine-dextroamphetamine (ADDERALL XR) 20 MG per 24 hr capsule; Take 1 capsule (20 mg) by mouth daily  Dispense: 30 capsule; Refill: 0  - amphetamine-dextroamphetamine (ADDERALL XR) 20 MG per 24 hr capsule; Take 1 capsule (20 mg) by mouth daily  Dispense: 30 capsule; Refill: 0  - amphetamine-dextroamphetamine (ADDERALL XR) 20 MG per 24 hr capsule; Take 1 capsule (20 mg) by mouth daily  Dispense: 30 capsule; Refill: 0      FU 3 months    Rosa Hanson NP  [facility]

## 2018-05-22 NOTE — MR AVS SNAPSHOT
After Visit Summary   5/22/2018    Valeria Thibodeaux    MRN: [id number]           Patient Information     Date Of Birth          1995        Visit Information        Provider Department      5/22/2018 11:00 AM Rosa Hanson NP [facility]        Today's Diagnoses     Attention deficit hyperactivity disorder (ADHD), predominantly inattentive type    -  1    Anxiety          Care Instructions        ASSESSMENT/PLAN:     1. Anxiety  - escitalopram (LEXAPRO) 20 MG tablet; Take 1 tablet (20 mg) by mouth daily  Dispense: 90 tablet; Refill: 1    2. Attention deficit hyperactivity disorder (ADHD), predominantly inattentive type  - amphetamine-dextroamphetamine (ADDERALL XR) 20 MG per 24 hr capsule; Take 1 capsule (20 mg) by mouth daily  Dispense: 30 capsule; Refill: 0  - amphetamine-dextroamphetamine (ADDERALL XR) 20 MG per 24 hr capsule; Take 1 capsule (20 mg) by mouth daily  Dispense: 30 capsule; Refill: 0  - amphetamine-dextroamphetamine (ADDERALL XR) 20 MG per 24 hr capsule; Take 1 capsule (20 mg) by mouth daily  Dispense: 30 capsule; Refill: 0      FU 3 months    Rosa Hanson NP  [facility]            Follow-ups after your visit        Who to contact     If you have questions or need follow up information about today's clinic visit or your schedule please contact [facility] directly at [phone].  Normal or non-critical lab and imaging results will be communicated to you by MyChart, letter or phone within 4 business days after the clinic has received the results. If you do not hear from us within 7 days, please contact the clinic through MyChart or phone. If you have a critical or abnormal lab result, we will notify you by phone as soon as possible.  Submit refill requests through [facility] or call your pharmacy and they will forward the refill request to us. Please allow 3 business days for your refill to be completed.          Additional Information About  "Your Visit        [employer] Information     [employer] lets you send messages to your doctor, view your test results, renew your prescriptions, schedule appointments and more. To sign up, go to [email or web address] . Click on \"Log in\" on the left side of the screen, which will take you to the Welcome page. Then click on \"Sign up Now\" on the right side of the page.     You will be asked to enter the access code listed below, as well as some personal information. Please follow the directions to create your username and password.     Your access code is: [email or web address]  Expires: 2018 11:31 AM     Your access code will  in 90 days. If you need help or a new code, please call your [facility] clinic or [phone].        Care EveryWhere ID     This is your Care EveryWhere ID. This could be used by other organizations to access your [facility] medical records  [id number]        Your Vitals Were     Pulse Temperature Respirations Height Pulse Oximetry BMI (Body Mass Index)    91 98.3  F (36.8  C) (Tympanic) 18 5' (1.524 m) 98% 27.15 kg/m2       Blood Pressure from Last 3 Encounters:   18 100/70   18 106/66   18 100/54    Weight from Last 3 Encounters:   18 139 lb (63 kg)   18 139 lb (63 kg)   18 139 lb (63 kg)              Today, you had the following     No orders found for display         Today's Medication Changes          These changes are accurate as of 18 11:31 AM.  If you have any questions, ask your nurse or doctor.               These medicines have changed or have updated prescriptions.        Dose/Directions    * amphetamine-dextroamphetamine 20 MG per 24 hr capsule   Commonly known as:  ADDERALL XR   This may have changed:  Another medication with the same name was added. Make sure you understand how and when to take each.   Used for:  Attention deficit hyperactivity disorder (ADHD), predominantly inattentive type   Changed by:  Rosa Hanson NP        Dose:  20 mg "   Start taking on:  5/30/2018   Take 1 capsule (20 mg) by mouth daily   Quantity:  30 capsule   Refills:  0       * amphetamine-dextroamphetamine 20 MG per 24 hr capsule   Commonly known as:  ADDERALL XR   This may have changed:  You were already taking a medication with the same name, and this prescription was added. Make sure you understand how and when to take each.   Used for:  Attention deficit hyperactivity disorder (ADHD), predominantly inattentive type   Changed by:  Rosa Hanson NP        Dose:  20 mg   Start taking on:  6/29/2018   Take 1 capsule (20 mg) by mouth daily   Quantity:  30 capsule   Refills:  0       * amphetamine-dextroamphetamine 20 MG per 24 hr capsule   Commonly known as:  ADDERALL XR   This may have changed:  You were already taking a medication with the same name, and this prescription was added. Make sure you understand how and when to take each.   Used for:  Attention deficit hyperactivity disorder (ADHD), predominantly inattentive type   Changed by:  Rosa Hanson NP        Dose:  20 mg   Start taking on:  7/29/2018   Take 1 capsule (20 mg) by mouth daily   Quantity:  30 capsule   Refills:  0       escitalopram 20 MG tablet   Commonly known as:  LEXAPRO   This may have changed:    - medication strength  - how much to take   Used for:  Anxiety   Changed by:  Rosa Hanson NP        Dose:  20 mg   Take 1 tablet (20 mg) by mouth daily   Quantity:  90 tablet   Refills:  1       * Notice:  This list has 3 medication(s) that are the same as other medications prescribed for you. Read the directions carefully, and ask your doctor or other care provider to review them with you.      Stop taking these medicines if you haven't already. Please contact your care team if you have questions.     UNABLE TO FIND   Stopped by:  Rosa Hanson NP                Where to get your medicines      These medications were sent to [facility] [address] [place] [address] [address] [facility] [address] [place] [address]     Phone:  [phone]     escitalopram 20 MG tablet         Some of these will need a paper prescription and others can be bought over the counter.  Ask your nurse if you have questions.     Bring a paper prescription for each of these medications     amphetamine-dextroamphetamine 20 MG per 24 hr capsule    amphetamine-dextroamphetamine 20 MG per 24 hr capsule    amphetamine-dextroamphetamine 20 MG per 24 hr capsule                Primary Care Provider Office Phone # Fax #    Rosa JacksonBERYL mena [phone] [address] [place] [address]        Equal Access to Services     [facility]: Hadii aad ku hadasho Soomaali, waaxda luqadaha, qaybta kaalmada adeegyada, liz ballard haymireyan adesophia arita . So [facility] [phone].    ATENCIÓN: Si habla español, tiene a rodriguez disposición servicios gratuitos de asistencia lingüística. [facility] [phone].    We comply with applicable federal civil rights laws and [place] laws. We do not discriminate on the basis of race, color, national origin, age, disability, sex, sexual orientation, or gender identity.            Thank you!     Thank you for choosing [facility]  for your care. Our goal is always to provide you with excellent care. Hearing back from our patients is one way we can continue to improve our services. Please take a few minutes to complete the written survey that you may receive in the mail after your visit with us. Thank you!             Your Updated Medication List - Protect others around you: Learn how to safely use, store and throw away your medicines at www.disposemymeds.org.          This list is accurate as of 5/22/18 11:31 AM.  Always use your most recent med list.                   Brand Name Dispense Instructions for use Diagnosis    * amphetamine-dextroamphetamine 20 MG per 24 hr capsule   Start taking on:  5/30/2018    ADDERALL XR    30 capsule     Take 1 capsule (20 mg) by mouth daily    Attention deficit hyperactivity disorder (ADHD), predominantly inattentive type       * amphetamine-dextroamphetamine 20 MG per 24 hr capsule   Start taking on:  6/29/2018    ADDERALL XR    30 capsule    Take 1 capsule (20 mg) by mouth daily    Attention deficit hyperactivity disorder (ADHD), predominantly inattentive type       * amphetamine-dextroamphetamine 20 MG per 24 hr capsule   Start taking on:  7/29/2018    ADDERALL XR    30 capsule    Take 1 capsule (20 mg) by mouth daily    Attention deficit hyperactivity disorder (ADHD), predominantly inattentive type       escitalopram 20 MG tablet    LEXAPRO    90 tablet    Take 1 tablet (20 mg) by mouth daily    Anxiety       levonorgestrel 20 MCG/24HR IUD    MIRENA     1 each (20 mcg) by Intrauterine route continuous    Encounter for insertion of intrauterine contraceptive device (IUD)       * Notice:  This list has 3 medication(s) that are the same as other medications prescribed for you. Read the directions carefully, and ask your doctor or other care provider to review them with you.

## 2018-05-22 NOTE — PROGRESS NOTES
SUBJECTIVE:   Valeria Thibodeaux is a 23 year old female who presents to clinic today for the following health issues:      Anxiety Follow-Up    Status since last visit: No change, but feels more anxious - interested in increasing Lexapro    Other associated symptoms:None    Complicating factors:     Significant life event: No     Current substance abuse: None        ADD Follow up  Symptoms have been present for: Longstanding  Aggravating factors: No  Relieving factors:  Medication as prescribed  Recent stressors:  No  Drug or alcohol use:  No  Past medications:  Se health history  Therapy: No  Psychiatric history: ADD  Focus at home or school:  Good  Ability to sequence tasks:  Good  Weight Stable  Yes  Suicidal Ideation or plan:  No  Evident abuse or misuse of medication: No  Request for early refills: No        PHQ-9 2018   Total Score 1 1 1   Q9: Suicide Ideation Not at all Not at all Not at all     BETSY-7 SCORE 2018   Total Score 0 1 4       PHQ-9  English  PHQ-9   Any Language  BETSY-7  Suicide Assessment Five-step Evaluation and Treatment (SAFE-T)            Amount of exercise or physical activity: 4-5 days/week for an average of 30-45 minutes    Problems taking medications regularly: No    Medication side effects: none    Diet: regular (no restrictions)        Problem list and histories reviewed & adjusted, as indicated.  Additional history: as documented    Patient Active Problem List   Diagnosis     ADHD (attention deficit hyperactivity disorder)     Anxiety     Ganglion cyst of wrist, right     Past Surgical History:   Procedure Laterality Date      SECTION N/A 10/15/2017    Procedure:  SECTION;   Section;  Surgeon: Ramez Marquez MD;  Location: [facility] OR     surgically repaired      blocked tear duct - left     TONSILLECTOMY      tonsillitis       Social History   Substance Use Topics     Smoking status: Former Smoker     Packs/day:  0.30     Years: 5.00     Types: Cigarettes     Smokeless tobacco: Never Used      Comment: tried to quit: no     Alcohol use Yes      Comment: occasionally     Family History   Problem Relation Age of Onset     DIABETES Maternal Grandfather      HEART DISEASE Maternal Grandfather      heart disease     Myocardial Infarction Paternal Aunt      Myocardial Infarction Paternal Grandfather      cause of death     Myocardial Infarction Paternal Uncle          Current Outpatient Prescriptions   Medication Sig Dispense Refill     [START ON 5/30/2018] amphetamine-dextroamphetamine (ADDERALL XR) 20 MG per 24 hr capsule Take 1 capsule (20 mg) by mouth daily 30 capsule 0     [START ON 6/29/2018] amphetamine-dextroamphetamine (ADDERALL XR) 20 MG per 24 hr capsule Take 1 capsule (20 mg) by mouth daily 30 capsule 0     [START ON 7/29/2018] amphetamine-dextroamphetamine (ADDERALL XR) 20 MG per 24 hr capsule Take 1 capsule (20 mg) by mouth daily 30 capsule 0     escitalopram (LEXAPRO) 20 MG tablet Take 1 tablet (20 mg) by mouth daily 90 tablet 1     levonorgestrel (MIRENA) 20 MCG/24HR IUD 1 each (20 mcg) by Intrauterine route continuous       [DISCONTINUED] escitalopram (LEXAPRO) 10 MG tablet Take 1 tablet (10 mg) by mouth daily 90 tablet 1     Allergies   Allergen Reactions     Bacitracin      Neosporin-Pt. Had eye swelling as a baby, Pt. States not even sure if she still has an allergy to neosporin     Bacitracin Zinc      Neosporin     Gramicidin D      Neosporin     Neomycin Sulfate      Neosporin     Polymyxin B      Neosporin     Polymyxin B Sulfate      Neosporin     Recent Labs   Lab Test  01/17/18   1507  10/16/17   0523  10/15/17   1102  10/14/17   1841   09/20/17   1050   A1C   --    --    --    --    --   5.6   ALT   --    --    --   11   --    --    CR   --   0.85  1.13*  1.11*   < >   --    GFRESTIMATED   --   82  59*  60*   < >   --    GFRESTBLACK   --   >90  71  73   < >   --    POTASSIUM   --   4.1  4.3  4.5   < >    --    TSH  1.03   --    --    --    --    --     < > = values in this interval not displayed.      BP Readings from Last 3 Encounters:   05/22/18 100/70   03/29/18 106/66   03/06/18 100/54    Wt Readings from Last 3 Encounters:   05/22/18 139 lb (63 kg)   03/29/18 139 lb (63 kg)   03/06/18 139 lb (63 kg)                  Labs reviewed in EPIC    Reviewed and updated as needed this visit by clinical staff  Tobacco  Allergies  Meds  Med Hx  Surg Hx  Fam Hx  Soc Hx      Reviewed and updated as needed this visit by Provider         ROS:  Constitutional, HEENT, cardiovascular, pulmonary, gi and gu systems are negative, except as otherwise noted.    OBJECTIVE:     /70  Pulse 91  Temp 98.3  F (36.8  C) (Tympanic)  Resp 18  Ht 5' (1.524 m)  Wt 139 lb (63 kg)  SpO2 98%  BMI 27.15 kg/m2  Body mass index is 27.15 kg/(m^2).       GENERAL: healthy, alert and no distress  EYES: Eyes grossly normal to inspection, PERRL and conjunctivae and sclerae normal  HENT: ear canals and TM's normal, nose and mouth without ulcers or lesions  NECK: no adenopathy, no asymmetry, masses, or scars and thyroid normal to palpation  RESP: lungs clear to auscultation - no rales, rhonchi or wheezes  CV: regular rate and rhythm, normal S1 S2, no S3 or S4, no murmur, click or rub, no peripheral edema and peripheral pulses strong  MS: no gross musculoskeletal defects noted, no edema  SKIN: no suspicious lesions or rashes  PSYCH: mentation appears normal, affect normal/bright - a bit more anxious          ASSESSMENT/PLAN:     1. Anxiety  - escitalopram (LEXAPRO) 20 MG tablet; Take 1 tablet (20 mg) by mouth daily  Dispense: 90 tablet; Refill: 1    2. Attention deficit hyperactivity disorder (ADHD), predominantly inattentive type  - amphetamine-dextroamphetamine (ADDERALL XR) 20 MG per 24 hr capsule; Take 1 capsule (20 mg) by mouth daily  Dispense: 30 capsule; Refill: 0  - amphetamine-dextroamphetamine (ADDERALL XR) 20 MG per 24 hr  capsule; Take 1 capsule (20 mg) by mouth daily  Dispense: 30 capsule; Refill: 0  - amphetamine-dextroamphetamine (ADDERALL XR) 20 MG per 24 hr capsule; Take 1 capsule (20 mg) by mouth daily  Dispense: 30 capsule; Refill: 0      FU 3 months    Rosa Hanson NP  [facility]

## 2018-05-23 ASSESSMENT — PATIENT HEALTH QUESTIONNAIRE - PHQ9: SUM OF ALL RESPONSES TO PHQ QUESTIONS 1-9: 1

## 2018-05-23 ASSESSMENT — ANXIETY QUESTIONNAIRES: GAD7 TOTAL SCORE: 4

## 2018-06-19 NOTE — PLAN OF CARE
History   No chief complaint on file.    HPI    History obtained from family    Carey is a 1 year old previously healthy male who presents with a three day history of runny nose and cough.  Symptoms started three days ago with clear rhinorrhea and a dry cough.  Over the past two days carey has also developed several episodes of non bloody non bilious post tussive emesis.  He continues to eat and drink although somewhat less than normal.  Associated symptoms include sore throat and ear pain.  He has no fever, diarrhea, respiratory distress, abdominal pain, or rash.  He is otherwise healthy, no sick contacts, no recent travels.  Immunizations UTD.    PMHx:  Past Medical History:   Diagnosis Date     Macrocephaly      Otitis media      History reviewed. No pertinent surgical history.  These were reviewed with the patient/family.    MEDICATIONS were reviewed and are as follows:   No current facility-administered medications for this encounter.      Current Outpatient Prescriptions   Medication     acetaminophen (TYLENOL) 160 MG/5ML elixir     ibuprofen (ADVIL/MOTRIN) 100 MG/5ML suspension     ondansetron (ZOFRAN) 4 MG/5ML solution     polyethylene glycol (MIRALAX) powder       ALLERGIES:  Review of patient's allergies indicates no known allergies.    IMMUNIZATIONS:  UTD by report.    SOCIAL HISTORY: Carey lives with family.      I have reviewed the Medications, Allergies, Past Medical and Surgical History, and Social History in the Epic system.    Review of Systems  Please see HPI for pertinent positives and negatives.  All other systems reviewed and found to be negative.        Physical Exam   Pulse: 102  Temp: 96.8  F (36  C)  Resp: 26  Weight: 17.3 kg (38 lb 2.2 oz)  SpO2: 96 %    Physical Exam  Appearance: Alert and appropriate, well developed, nontoxic, with moist mucous membranes.  HEENT: Head: Normocephalic and atraumatic. Eyes: PERRL, EOM grossly intact, conjunctivae and sclerae clear. Ears:  Burrows catheter removed, bandage removed from  incision, patient showered- patient tolerated well, and up in room independently. Bedding changed   Tympanic membranes clear bilaterally, without inflammation or effusion. Nose: clear rhinorrhea. Mouth/Throat: No oral lesions, pharynx clear with no erythema or exudate.  Neck: Supple, no masses, no meningismus. No significant cervical lymphadenopathy.  Pulmonary: No grunting, flaring, retractions or stridor. Good air entry, clear to auscultation bilaterally, with no rales, rhonchi, or wheezing.  Cardiovascular: Regular rate and rhythm, normal S1 and S2, with no murmurs.  Normal symmetric peripheral pulses and brisk cap refill.  Abdominal: Normal bowel sounds, soft, nontender, nondistended, with no masses and no hepatosplenomegaly.  Neurologic: Alert, cranial nerves II-XII grossly intact, moving all extremities equally.  Extremities/Back: No deformity.  Skin: No significant rashes, ecchymoses, or lacerations.  Genitourinary: Deferred  Rectal: Deferred    ED Course     ED Course     Procedures    No results found for this or any previous visit (from the past 24 hour(s)).    Medications - No data to display    Old [facility] from [facility] reviewed, supported history as above.  Patient was attended to immediately upon arrival and assessed for immediate life-threatening conditions.  History obtained from family.    Critical care time:  none       Assessments & Plan (with Medical Decision Making)   1. Viral URI    Deon is a 1 year old previously healthy female who presents with a three day history of rhinorrhea, dry cough, and post tussive emesis.  His presentation is most consistent with a viral URI.  No sign of AOM.  No fever, tachypnea, or pulmonary exam findings that would concern me for a bacterial pneumonia.  He is well hydrated and well appearing today in the emergency department thus I have no concern for an SBI such as UTI, sepsis, or meningitis.    Plan:  - d/c home  - ibuprofen, tylenol prn for fever, pain  - f/u with pcp as needed  - indications for f/u include worsening cough, developing respiratory distress,  unable to tolerate po intake    Froilan Li MD    I have reviewed the nursing notes.    I have reviewed the findings, diagnosis, plan and need for follow up with the patient.  6/18/2018   [facility] EMERGENCY DEPARTMENT     Froilan Li MD  06/19/18 0004

## 2018-08-10 ENCOUNTER — OFFICE VISIT (OUTPATIENT)
Dept: FAMILY MEDICINE | Facility: OTHER | Age: 23
End: 2018-08-10
Attending: NURSE PRACTITIONER
Payer: COMMERCIAL

## 2018-08-10 ENCOUNTER — TELEPHONE (OUTPATIENT)
Dept: FAMILY MEDICINE | Facility: OTHER | Age: 23
End: 2018-08-10

## 2018-08-10 VITALS
RESPIRATION RATE: 16 BRPM | HEIGHT: 60 IN | TEMPERATURE: 96.5 F | BODY MASS INDEX: 26.5 KG/M2 | HEART RATE: 105 BPM | OXYGEN SATURATION: 99 % | DIASTOLIC BLOOD PRESSURE: 64 MMHG | SYSTOLIC BLOOD PRESSURE: 104 MMHG | WEIGHT: 135 LBS

## 2018-08-10 DIAGNOSIS — Z01.818 PRE-OP EXAM: Primary | ICD-10-CM

## 2018-08-10 DIAGNOSIS — Z01.818 PREOP GENERAL PHYSICAL EXAM: ICD-10-CM

## 2018-08-10 DIAGNOSIS — F90.0 ATTENTION DEFICIT HYPERACTIVITY DISORDER (ADHD), PREDOMINANTLY INATTENTIVE TYPE: ICD-10-CM

## 2018-08-10 LAB
ANION GAP SERPL CALCULATED.3IONS-SCNC: 9 MMOL/L (ref 3–14)
BASOPHILS # BLD AUTO: 0 10E9/L (ref 0–0.2)
BASOPHILS NFR BLD AUTO: 0.3 %
BUN SERPL-MCNC: 14 MG/DL (ref 7–30)
CALCIUM SERPL-MCNC: 8.9 MG/DL (ref 8.5–10.1)
CHLORIDE SERPL-SCNC: 105 MMOL/L (ref 94–109)
CO2 SERPL-SCNC: 25 MMOL/L (ref 20–32)
CREAT SERPL-MCNC: 0.79 MG/DL (ref 0.52–1.04)
DIFFERENTIAL METHOD BLD: NORMAL
EOSINOPHIL # BLD AUTO: 0.2 10E9/L (ref 0–0.7)
EOSINOPHIL NFR BLD AUTO: 3.4 %
ERYTHROCYTE [DISTWIDTH] IN BLOOD BY AUTOMATED COUNT: 12.5 % (ref 10–15)
GFR SERPL CREATININE-BSD FRML MDRD: 88 ML/MIN/1.7M2
GLUCOSE SERPL-MCNC: 141 MG/DL (ref 70–99)
HCG UR QL: NEGATIVE
HCT VFR BLD AUTO: 40.5 % (ref 35–47)
HGB BLD-MCNC: 13.9 G/DL (ref 11.7–15.7)
LYMPHOCYTES # BLD AUTO: 2.3 10E9/L (ref 0.8–5.3)
LYMPHOCYTES NFR BLD AUTO: 37.9 %
MCH RBC QN AUTO: 31.5 PG (ref 26.5–33)
MCHC RBC AUTO-ENTMCNC: 34.3 G/DL (ref 31.5–36.5)
MCV RBC AUTO: 92 FL (ref 78–100)
MONOCYTES # BLD AUTO: 0.4 10E9/L (ref 0–1.3)
MONOCYTES NFR BLD AUTO: 6.1 %
NEUTROPHILS # BLD AUTO: 3.2 10E9/L (ref 1.6–8.3)
NEUTROPHILS NFR BLD AUTO: 52.3 %
PLATELET # BLD AUTO: 259 10E9/L (ref 150–450)
POTASSIUM SERPL-SCNC: 4 MMOL/L (ref 3.4–5.3)
RBC # BLD AUTO: 4.41 10E12/L (ref 3.8–5.2)
SODIUM SERPL-SCNC: 139 MMOL/L (ref 133–144)
WBC # BLD AUTO: 6.1 10E9/L (ref 4–11)

## 2018-08-10 PROCEDURE — 36415 COLL VENOUS BLD VENIPUNCTURE: CPT | Performed by: NURSE PRACTITIONER

## 2018-08-10 PROCEDURE — 80048 BASIC METABOLIC PNL TOTAL CA: CPT | Performed by: NURSE PRACTITIONER

## 2018-08-10 PROCEDURE — 85025 COMPLETE CBC W/AUTO DIFF WBC: CPT | Performed by: NURSE PRACTITIONER

## 2018-08-10 PROCEDURE — 93000 ELECTROCARDIOGRAM COMPLETE: CPT | Performed by: INTERNAL MEDICINE

## 2018-08-10 PROCEDURE — 81025 URINE PREGNANCY TEST: CPT | Performed by: NURSE PRACTITIONER

## 2018-08-10 PROCEDURE — 99214 OFFICE O/P EST MOD 30 MIN: CPT | Mod: 25 | Performed by: NURSE PRACTITIONER

## 2018-08-10 RX ORDER — DEXTROAMPHETAMINE SACCHARATE, AMPHETAMINE ASPARTATE MONOHYDRATE, DEXTROAMPHETAMINE SULFATE AND AMPHETAMINE SULFATE 7.5; 7.5; 7.5; 7.5 MG/1; MG/1; MG/1; MG/1
30 CAPSULE, EXTENDED RELEASE ORAL DAILY
Qty: 30 CAPSULE | Refills: 0 | Status: SHIPPED | OUTPATIENT
Start: 2018-10-11 | End: 2018-10-23

## 2018-08-10 RX ORDER — DEXTROAMPHETAMINE SACCHARATE, AMPHETAMINE ASPARTATE MONOHYDRATE, DEXTROAMPHETAMINE SULFATE AND AMPHETAMINE SULFATE 7.5; 7.5; 7.5; 7.5 MG/1; MG/1; MG/1; MG/1
30 CAPSULE, EXTENDED RELEASE ORAL DAILY
Qty: 30 CAPSULE | Refills: 0 | Status: SHIPPED | OUTPATIENT
Start: 2018-08-10 | End: 2019-01-30

## 2018-08-10 RX ORDER — DEXTROAMPHETAMINE SACCHARATE, AMPHETAMINE ASPARTATE MONOHYDRATE, DEXTROAMPHETAMINE SULFATE AND AMPHETAMINE SULFATE 7.5; 7.5; 7.5; 7.5 MG/1; MG/1; MG/1; MG/1
30 CAPSULE, EXTENDED RELEASE ORAL DAILY
Qty: 30 CAPSULE | Refills: 0 | Status: SHIPPED | OUTPATIENT
Start: 2018-09-10 | End: 2019-01-30

## 2018-08-10 ASSESSMENT — ANXIETY QUESTIONNAIRES
1. FEELING NERVOUS, ANXIOUS, OR ON EDGE: NOT AT ALL
2. NOT BEING ABLE TO STOP OR CONTROL WORRYING: NOT AT ALL
GAD7 TOTAL SCORE: 0
3. WORRYING TOO MUCH ABOUT DIFFERENT THINGS: NOT AT ALL
4. TROUBLE RELAXING: NOT AT ALL
6. BECOMING EASILY ANNOYED OR IRRITABLE: NOT AT ALL
7. FEELING AFRAID AS IF SOMETHING AWFUL MIGHT HAPPEN: NOT AT ALL
5. BEING SO RESTLESS THAT IT IS HARD TO SIT STILL: NOT AT ALL

## 2018-08-10 ASSESSMENT — PAIN SCALES - GENERAL: PAINLEVEL: NO PAIN (0)

## 2018-08-10 NOTE — MR AVS SNAPSHOT
After Visit Summary   8/10/2018    Valeria Thibodeaux    MRN: [id number]           Patient Information     Date Of Birth          1995        Visit Information        Provider Department      8/10/2018 10:00 AM Rosa Hanson NP [facility]        Today's Diagnoses     Pre-op exam    -  1    Preop general physical exam        Attention deficit hyperactivity disorder (ADHD), predominantly inattentive type          Care Instructions      Before Your Surgery      Call your surgeon if there is any change in your health. This includes signs of a cold or flu (such as a sore throat, runny nose, cough, rash or fever).    Do not smoke, drink alcohol or take over the counter medicine (unless your surgeon or primary care doctor tells you to) for the 24 hours before and after surgery.    If you take prescribed drugs: Follow your doctor s orders about which medicines to take and which to stop until after surgery.    Eating and drinking prior to surgery: follow the instructions from your surgeon    Take a shower or bath the night before surgery. Use the soap your surgeon gave you to gently clean your skin. If you do not have soap from your surgeon, use your regular soap. Do not shave or scrub the surgery site.  Wear clean pajamas and have clean sheets on your bed.           Follow-ups after your visit        Who to contact     If you have questions or need follow up information about today's clinic visit or your schedule please contact [facility] directly at [phone].  Normal or non-critical lab and imaging results will be communicated to you by MyChart, letter or phone within 4 business days after the clinic has received the results. If you do not hear from us within 7 days, please contact the clinic through MyChart or phone. If you have a critical or abnormal lab result, we will notify you by phone as soon as possible.  Submit refill requests through [employer] or call your pharmacy  "and they will forward the refill request to us. Please allow 3 business days for your refill to be completed.          Additional Information About Your Visit        [employer] Information     [employer] lets you send messages to your doctor, view your test results, renew your prescriptions, schedule appointments and more. To sign up, go to [email or web address] . Click on \"Log in\" on the left side of the screen, which will take you to the Welcome page. Then click on \"Sign up Now\" on the right side of the page.     You will be asked to enter the access code listed below, as well as some personal information. Please follow the directions to create your username and password.     Your access code is: [email or web address]  Expires: 2018  9:59 AM     Your access code will  in 90 days. If you need help or a new code, please call your [facility] clinic or [phone].        Care EveryWhere ID     This is your [facility] EveryWhere ID. This could be used by other organizations to access your [facility] medical records  [id number]        Your Vitals Were     Pulse Temperature Respirations Height Pulse Oximetry BMI (Body Mass Index)    105 96.5  F (35.8  C) (Tympanic) 16 4' 11.5\" (1.511 m) 99% 26.81 kg/m2       Blood Pressure from Last 3 Encounters:   08/10/18 104/64   18 100/70   18 106/66    Weight from Last 3 Encounters:   08/10/18 135 lb (61.2 kg)   18 139 lb (63 kg)   18 139 lb (63 kg)              We Performed the Following     Basic metabolic panel     CBC with platelets differential     EKG 12-lead complete w/read - (Clinic Performed)     HCG qualitative urine          Today's Medication Changes          These changes are accurate as of 8/10/18 10:37 AM.  If you have any questions, ask your nurse or doctor.               Start taking these medicines.        Dose/Directions    * amphetamine-dextroamphetamine 30 MG per 24 hr capsule   Commonly known as:  ADDERALL XR   Used for:  Attention deficit " hyperactivity disorder (ADHD), predominantly inattentive type   Replaces:  amphetamine-dextroamphetamine 20 MG per 24 hr capsule   Started by:  Rosa Hanson NP        Dose:  30 mg   Take 1 capsule (30 mg) by mouth daily   Quantity:  30 capsule   Refills:  0       * amphetamine-dextroamphetamine 30 MG per 24 hr capsule   Commonly known as:  ADDERALL XR   Used for:  Attention deficit hyperactivity disorder (ADHD), predominantly inattentive type   Started by:  Rosa Hanson NP        Dose:  30 mg   Start taking on:  9/10/2018   Take 1 capsule (30 mg) by mouth daily   Quantity:  30 capsule   Refills:  0       * amphetamine-dextroamphetamine 30 MG per 24 hr capsule   Commonly known as:  ADDERALL XR   Used for:  Attention deficit hyperactivity disorder (ADHD), predominantly inattentive type   Started by:  Rosa Hanson NP        Dose:  30 mg   Start taking on:  10/11/2018   Take 1 capsule (30 mg) by mouth daily   Quantity:  30 capsule   Refills:  0       * Notice:  This list has 3 medication(s) that are the same as other medications prescribed for you. Read the directions carefully, and ask your doctor or other care provider to review them with you.      Stop taking these medicines if you haven't already. Please contact your care team if you have questions.     amphetamine-dextroamphetamine 20 MG per 24 hr capsule   Commonly known as:  ADDERALL XR   Replaced by:  amphetamine-dextroamphetamine 30 MG per 24 hr capsule   Stopped by:  Rosa Hanson NP                Where to get your medicines      Some of these will need a paper prescription and others can be bought over the counter.  Ask your nurse if you have questions.     Bring a paper prescription for each of these medications     amphetamine-dextroamphetamine 30 MG per 24 hr capsule    amphetamine-dextroamphetamine 30 MG per 24 hr capsule    amphetamine-dextroamphetamine 30 MG per 24 hr capsule                Primary Care Provider Office Phone # Fax #    Rosa Hanson NP  [phone]       [address]  [place] [address]        Equal Access to Services     [place] : Hadii rober Conner, waalexia castrofrancisha, stpehankoko gonzalezclarissakevin bustoslisakevin, liz ballard caroaugie chensophia tishlindseyjax weiss. So [facility] [phone].    ATENCIÓN: Si habla español, tiene a rodriguez disposición servicios gratuitos de asistencia lingüística. Llame al [phone].    We comply with applicable federal civil rights laws and [place] laws. We do not discriminate on the basis of race, color, national origin, age, disability, sex, sexual orientation, or gender identity.            Thank you!     Thank you for choosing [facility]  for your care. Our goal is always to provide you with excellent care. Hearing back from our patients is one way we can continue to improve our services. Please take a few minutes to complete the written survey that you may receive in the mail after your visit with us. Thank you!             Your Updated Medication List - Protect others around you: Learn how to safely use, store and throw away your medicines at www.disposemymeds.org.          This list is accurate as of 8/10/18 10:37 AM.  Always use your most recent med list.                   Brand Name Dispense Instructions for use Diagnosis    * amphetamine-dextroamphetamine 30 MG per 24 hr capsule    ADDERALL XR    30 capsule    Take 1 capsule (30 mg) by mouth daily    Attention deficit hyperactivity disorder (ADHD), predominantly inattentive type       * amphetamine-dextroamphetamine 30 MG per 24 hr capsule   Start taking on:  9/10/2018    ADDERALL XR    30 capsule    Take 1 capsule (30 mg) by mouth daily    Attention deficit hyperactivity disorder (ADHD), predominantly inattentive type       * amphetamine-dextroamphetamine 30 MG per 24 hr capsule   Start taking on:  10/11/2018    ADDERALL XR    30 capsule    Take 1 capsule (30 mg) by mouth daily    Attention deficit hyperactivity disorder (ADHD),  predominantly inattentive type       escitalopram 20 MG tablet    LEXAPRO    90 tablet    Take 1 tablet (20 mg) by mouth daily    Anxiety       levonorgestrel 20 MCG/24HR IUD    MIRENA     1 each (20 mcg) by Intrauterine route continuous    Encounter for insertion of intrauterine contraceptive device (IUD)       * Notice:  This list has 3 medication(s) that are the same as other medications prescribed for you. Read the directions carefully, and ask your doctor or other care provider to review them with you.

## 2018-08-10 NOTE — NURSING NOTE
"Chief Complaint   Patient presents with     Pre-Op Exam       Initial /64 (BP Location: Right arm, Patient Position: Sitting, Cuff Size: Adult Large)  Pulse 105  Temp 96.5  F (35.8  C) (Tympanic)  Resp 16  Ht 4' 11.5\" (1.511 m)  Wt 135 lb (61.2 kg)  SpO2 99%  BMI 26.81 kg/m2 Estimated body mass index is 26.81 kg/(m^2) as calculated from the following:    Height as of this encounter: 4' 11.5\" (1.511 m).    Weight as of this encounter: 135 lb (61.2 kg).  Medication Reconciliation: complete    Deisy Dong LPN    "

## 2018-08-10 NOTE — PROGRESS NOTES
[facility]  [address]  [place] [address]  [phone]  Dept: [phone]    PRE-OP EVALUATION:  Today's date: 8/10/2018    Valeria Thibodeaux (: 1995) presents for pre-operative evaluation assessment as requested by Dr. FLORES.  She requires evaluation and anesthesia risk assessment prior to undergoing surgery/procedure for treatment of RIGHT WRIST CYST .    Proposed Surgery/ Procedure: RIGHT WRIST CYST REMOVAL  Date of Surgery/ Procedure:   Time of Surgery/ Procedure: 8 15 AM  [facility]urgical [facility]  Fax number for surgical facility: ON FILE WITH [facility]  Primary Physician: Rosa Hanson  Type of Anesthesia Anticipated: to be determined    Patient has a Health Care Directive or Living Will:  NO    1. NO - Do you have a history of heart attack, stroke, stent, bypass or surgery on an artery in the head, neck, heart or legs?  2. NO - Do you ever have any pain or discomfort in your chest?  3. NO - Do you have a history of  Heart Failure?  4. NO - Are you troubled by shortness of breath when: walking on the level, up a slight hill or at night?  5. NO - Do you currently have a cold, bronchitis or other respiratory infection?  6. NO - Do you have a cough, shortness of breath or wheezing?  7. NO - Do you sometimes get pains in the calves of your legs when you walk?  8. NO - Do you or anyone in your family have previous history of blood clots?  9. NO - Do you or does anyone in your family have a serious bleeding problem such as prolonged bleeding following surgeries or cuts?  10. YES - HAVE YOU EVER HAD PROBLEMS WITH ANEMIA OR BEEN TOLD TO TAKE IRON PILLS? WHILE SHE WAS PREGNANT 2017 - stable since  11. NO - Have you had any abnormal blood loss such as black, tarry or bloody stools, or abnormal vaginal bleeding?  12. NO - Have you ever had a blood transfusion?  13. YES - HAVE YOU OR ANY OF YOUR RELATIVES EVER HAD  PROBLEMS WITH ANESTHESIA? SELF - Vomiting DURING C SECTION, ELEVATED HEART RATE - Spinal anesthesia.  No anesthesia reaction prior - with tonsillectomy as a child  14. NO - Do you have sleep apnea, excessive snoring or daytime drowsiness?  15. NO - Do you have any prosthetic heart valves?  16. NO - Do you have prosthetic joints?  17. NO - Is there any chance that you may be pregnant?      HPI:     HPI related to upcoming procedure:   Right dorsal wrist - ganglion Cyst      See problem list for active medical problems.  Problems all longstanding and stable, except as noted/documented.  See ROS for pertinent symptoms related to these conditions.        ADD med's are due for refill.  Would like to slightly increase dose, as concentration falls off  After a few hours. Will increase med, follow up in a few weeks                                                                                                                                                     .    MEDICAL HISTORY:     Patient Active Problem List    Diagnosis Date Noted     Ganglion cyst of wrist, right 2018     Priority: Medium     Anxiety 2018     Priority: Medium     ADHD (attention deficit hyperactivity disorder) 2017     Priority: Medium      Past Medical History:   Diagnosis Date     Anxiety 2018     Ganglion cyst of wrist, right 2018     Tobacco dependence 2015     Past Surgical History:   Procedure Laterality Date      SECTION N/A 10/15/2017    Procedure:  SECTION;   Section;  Surgeon: Ramez Marquez MD;  Location: [facility] OR     surgically repaired      blocked tear duct - left     TONSILLECTOMY      tonsillitis     Current Outpatient Prescriptions   Medication Sig Dispense Refill     amphetamine-dextroamphetamine (ADDERALL XR) 20 MG per 24 hr capsule Take 1 capsule (20 mg) by mouth daily 30 capsule 0     escitalopram (LEXAPRO) 20 MG tablet Take 1 tablet (20 mg) by mouth daily 90 tablet 1      "levonorgestrel (MIRENA) 20 MCG/24HR IUD 1 each (20 mcg) by Intrauterine route continuous       OTC products: None, except as noted above    Allergies   Allergen Reactions     Bacitracin      Neosporin-Pt. Had eye swelling as a baby, Pt. States not even sure if she still has an allergy to neosporin     Bacitracin Zinc      Neosporin     Gramicidin D      Neosporin     Neomycin Sulfate      Neosporin     Polymyxin B      Neosporin     Polymyxin B Sulfate      Neosporin      Latex Allergy: NO    Social History   Substance Use Topics     Smoking status: Former Smoker     Packs/day: 0.30     Years: 5.00     Types: Cigarettes     Smokeless tobacco: Never Used      Comment: tried to quit: no     Alcohol use Yes      Comment: occasionally     History   Drug Use No       REVIEW OF SYSTEMS:     CONSTITUTIONAL: NEGATIVE for fever, chills, change in weight  INTEGUMENTARY/SKIN: NEGATIVE for worrisome rashes, moles or lesions  EYES: NEGATIVE for vision changes or irritation  ENT/MOUTH: NEGATIVE for ear, mouth and throat problems  RESP: NEGATIVE for significant cough or SOB  CV: NEGATIVE for chest pain, palpitations or peripheral edema  GI: NEGATIVE for nausea, abdominal pain, heartburn, or change in bowel habits  : NEGATIVE for frequency, dysuria, or hematuria  MUSCULOSKELETAL: Right hand and wrist pain   NEURO: NEGATIVE for weakness, dizziness or paresthesias  ENDOCRINE: NEGATIVE for temperature intolerance, skin/hair changes  HEME: NEGATIVE for bleeding problems  PSYCHIATRIC: NEGATIVE for changes in mood or affect    EXAM:   /64 (BP Location: Right arm, Patient Position: Sitting, Cuff Size: Adult Large)  Pulse 105  Temp 96.5  F (35.8  C) (Tympanic)  Resp 16  Ht 4' 11.5\" (1.511 m)  Wt 135 lb (61.2 kg)  SpO2 99%  BMI 26.81 kg/m2       GENERAL APPEARANCE: healthy, alert and no distress     EYES: EOMI, PERRL     HENT: ear canals and TM's normal and nose and mouth without ulcers or lesions     NECK: no adenopathy, no " asymmetry, masses, or scars and thyroid normal to palpation     RESP: lungs clear to auscultation - no rales, rhonchi or wheezes     CV: regular rates and rhythm, normal S1 S2, no S3 or S4 and no murmur, click or rub     ABDOMEN:  soft, nontender, no HSM or masses and bowel sounds normal     MS: Right hand - dorsal pain, wrist     SKIN: no suspicious lesions or rashes     NEURO: Normal strength and tone, sensory exam grossly normal, mentation intact and    speech normal     PSYCH: mentation appears normal. and affect normal/bright     LYMPHATICS: No cervical adenopathy    DIAGNOSTICS:     EKG attached, normal    Results for orders placed or performed in visit on 08/10/18 (from the past 24 hour(s))   CBC with platelets differential   Result Value Ref Range    WBC 6.1 4.0 - 11.0 10e9/L    RBC Count 4.41 3.8 - 5.2 10e12/L    Hemoglobin 13.9 11.7 - 15.7 g/dL    Hematocrit 40.5 35.0 - 47.0 %    MCV 92 78 - 100 fl    MCH 31.5 26.5 - 33.0 pg    MCHC 34.3 31.5 - 36.5 g/dL    RDW 12.5 10.0 - 15.0 %    Platelet Count 259 150 - 450 10e9/L    Diff Method Automated Method     % Neutrophils 52.3 %    % Lymphocytes 37.9 %    % Monocytes 6.1 %    % Eosinophils 3.4 %    % Basophils 0.3 %    Absolute Neutrophil 3.2 1.6 - 8.3 10e9/L    Absolute Lymphocytes 2.3 0.8 - 5.3 10e9/L    Absolute Monocytes 0.4 0.0 - 1.3 10e9/L    Absolute Eosinophils 0.2 0.0 - 0.7 10e9/L    Absolute Basophils 0.0 0.0 - 0.2 10e9/L   Basic metabolic panel   Result Value Ref Range    Sodium 139 133 - 144 mmol/L    Potassium 4.0 3.4 - 5.3 mmol/L    Chloride 105 94 - 109 mmol/L    Carbon Dioxide 25 20 - 32 mmol/L    Anion Gap 9 3 - 14 mmol/L    Glucose 141 (H) 70 - 99 mg/dL    Urea Nitrogen 14 7 - 30 mg/dL    Creatinine 0.79 0.52 - 1.04 mg/dL    GFR Estimate 88 >60 mL/min/1.7m2    GFR Estimate If Black >90 >60 mL/min/1.7m2    Calcium 8.9 8.5 - 10.1 mg/dL   HCG qualitative urine   Result Value Ref Range    HCG Qual Urine Negative NEG^Negative         IMPRESSION:    Reason for surgery/procedure:   Right dorsal wrist - ganglion Cyst      The proposed surgical procedure is considered LOW risk.    REVISED CARDIAC RISK INDEX  The patient has the following serious cardiovascular risks for perioperative complications such as (MI, PE, VFib and 3  AV Block):  No serious cardiac risks  INTERPRETATION: 0 risks: Class I (very low risk - 0.4% complication rate)    The patient has the following additional risks for perioperative complications:  No identified additional risks      ICD-10-CM    1. Pre-op exam Z01.818 CBC with platelets differential     Basic metabolic panel     EKG 12-lead complete w/read - (Clinic Performed)     HCG qualitative urine   2. Preop general physical exam Z01.818        RECOMMENDATIONS:         APPROVAL GIVEN to proceed with proposed procedure, without further diagnostic evaluation       Signed Electronically by: Rosa Hanson NP    Copy of this evaluation report is provided to requesting physician.    [facility] Preop Guidelines    Revised Cardiac Risk Index

## 2018-08-11 ASSESSMENT — PATIENT HEALTH QUESTIONNAIRE - PHQ9: SUM OF ALL RESPONSES TO PHQ QUESTIONS 1-9: 1

## 2018-08-11 ASSESSMENT — ANXIETY QUESTIONNAIRES: GAD7 TOTAL SCORE: 0

## 2018-08-17 DIAGNOSIS — F32.A DEPRESSION, UNSPECIFIED DEPRESSION TYPE: ICD-10-CM

## 2018-08-17 NOTE — TELEPHONE ENCOUNTER
Zoloft       Last Written Prescription Date:  12/21/2017 medication has been DC from med list   Last Fill Quantity: 30,   # refills: 0  Last Office Visit: 8/10/2018  Future Office visit:    Next 5 appointments (look out 90 days)     Sep 05, 2018 11:00 AM CDT   (Arrive by 10:45 AM)   SHORT with Rosa Hanson NP   [facility] ([facility] )    [address]  [facility] [address]   [phone]

## 2018-08-17 NOTE — TELEPHONE ENCOUNTER
Please see note below.  Sertraline discontinued on 1/17/18.  Looks like patient started on Lexapro at that time.  PCP Valentin.  Please advise.  Thank you.

## 2018-08-22 ENCOUNTER — APPOINTMENT (OUTPATIENT)
Dept: ANESTHESIOLOGY | Facility: HOSPITAL | Age: 23
End: 2018-08-22
Attending: SPECIALIST
Payer: COMMERCIAL

## 2018-08-22 ENCOUNTER — RESULTS ONLY (OUTPATIENT)
Dept: LAB | Age: 23
End: 2018-08-22

## 2018-08-22 PROCEDURE — 01810 ANES PX NRV MUSC F/ARM WRST: CPT | Mod: QZ | Performed by: NURSE ANESTHETIST, CERTIFIED REGISTERED

## 2018-08-24 LAB — COPATH REPORT: NORMAL

## 2018-09-05 ENCOUNTER — OFFICE VISIT (OUTPATIENT)
Dept: FAMILY MEDICINE | Facility: OTHER | Age: 23
End: 2018-09-05
Attending: NURSE PRACTITIONER
Payer: COMMERCIAL

## 2018-09-05 ENCOUNTER — TRANSFERRED RECORDS (OUTPATIENT)
Dept: HEALTH INFORMATION MANAGEMENT | Facility: CLINIC | Age: 23
End: 2018-09-05

## 2018-09-05 VITALS
SYSTOLIC BLOOD PRESSURE: 120 MMHG | WEIGHT: 136 LBS | RESPIRATION RATE: 14 BRPM | HEIGHT: 60 IN | DIASTOLIC BLOOD PRESSURE: 70 MMHG | BODY MASS INDEX: 26.7 KG/M2 | HEART RATE: 64 BPM

## 2018-09-05 DIAGNOSIS — M62.830 SPASM OF BACK MUSCLES: Primary | ICD-10-CM

## 2018-09-05 PROCEDURE — 99213 OFFICE O/P EST LOW 20 MIN: CPT | Performed by: NURSE PRACTITIONER

## 2018-09-05 ASSESSMENT — ANXIETY QUESTIONNAIRES
IF YOU CHECKED OFF ANY PROBLEMS ON THIS QUESTIONNAIRE, HOW DIFFICULT HAVE THESE PROBLEMS MADE IT FOR YOU TO DO YOUR WORK, TAKE CARE OF THINGS AT HOME, OR GET ALONG WITH OTHER PEOPLE: NOT DIFFICULT AT ALL
7. FEELING AFRAID AS IF SOMETHING AWFUL MIGHT HAPPEN: NOT AT ALL
1. FEELING NERVOUS, ANXIOUS, OR ON EDGE: NOT AT ALL
4. TROUBLE RELAXING: NOT AT ALL
2. NOT BEING ABLE TO STOP OR CONTROL WORRYING: NOT AT ALL
3. WORRYING TOO MUCH ABOUT DIFFERENT THINGS: NOT AT ALL
6. BECOMING EASILY ANNOYED OR IRRITABLE: NOT AT ALL
5. BEING SO RESTLESS THAT IT IS HARD TO SIT STILL: NOT AT ALL
GAD7 TOTAL SCORE: 0

## 2018-09-05 ASSESSMENT — PAIN SCALES - GENERAL: PAINLEVEL: MODERATE PAIN (4)

## 2018-09-05 NOTE — PATIENT INSTRUCTIONS
ASSESSMENT/PLAN:     1. Spasm of back muscles  - Epsom salt soak  - Anti-inflammatory PRN  - Massage  - Stretch    Follow-up if unimproved          Rosa Hanson NP  [facility]

## 2018-09-05 NOTE — NURSING NOTE
Chief Complaint   Patient presents with     Numbness       Initial /70 (BP Location: Left arm, Patient Position: Sitting, Cuff Size: Adult Regular)  Pulse 64  Resp 14  Ht 5' (1.524 m)  Wt 136 lb (61.7 kg)  BMI 26.56 kg/m2 Estimated body mass index is 26.56 kg/(m^2) as calculated from the following:    Height as of this encounter: 5' (1.524 m).    Weight as of this encounter: 136 lb (61.7 kg).  Medication Reconciliation: complete    Leah Sahu LPN

## 2018-09-05 NOTE — PROGRESS NOTES
SUBJECTIVE:   Valeria Thibodeaux is a 23 year old female who presents to clinic today for the following health issues:      Numbness upper back    Onset: 1 month    Description:   Location: between spine and right shoulder blade  Character: tingling     Intensity: moderate    Progression of Symptoms: worse    Accompanying Signs & Symptoms:  Other symptoms: numbness and tingling    History:   Previous similar pain: no       Precipitating factors:   Trauma or overuse: no, worse when sitting or standing     Alleviating factors:  Improved by: nothing    Therapies Tried and outcome: stretching, helps          Problem list and histories reviewed & adjusted, as indicated.  Additional history: as documented    Patient Active Problem List   Diagnosis     ADHD (attention deficit hyperactivity disorder)     Anxiety     Ganglion cyst of wrist, right     Past Surgical History:   Procedure Laterality Date      SECTION N/A 10/15/2017    Procedure:  SECTION;   Section;  Surgeon: Ramez Marquez MD;  Location: [facility]     ORTHOPEDIC SURGERY  2018    rt wrist , cyst removed     surgically repaired      blocked tear duct - left     TONSILLECTOMY      tonsillitis       Social History   Substance Use Topics     Smoking status: Former Smoker     Packs/day: 0.30     Years: 5.00     Types: Cigarettes     Smokeless tobacco: Never Used      Comment: tried to quit: no     Alcohol use Yes      Comment: occasionally     Family History   Problem Relation Age of Onset     Diabetes Maternal Grandfather      HEART DISEASE Maternal Grandfather      heart disease     Myocardial Infarction Paternal Aunt      Myocardial Infarction Paternal Grandfather      cause of death     Myocardial Infarction Paternal Uncle          Current Outpatient Prescriptions   Medication Sig Dispense Refill     amphetamine-dextroamphetamine (ADDERALL XR) 30 MG per 24 hr capsule Take 1 capsule (30 mg) by mouth daily 30 capsule 0     [START ON  9/10/2018] amphetamine-dextroamphetamine (ADDERALL XR) 30 MG per 24 hr capsule Take 1 capsule (30 mg) by mouth daily 30 capsule 0     [START ON 10/11/2018] amphetamine-dextroamphetamine (ADDERALL XR) 30 MG per 24 hr capsule Take 1 capsule (30 mg) by mouth daily 30 capsule 0     escitalopram (LEXAPRO) 20 MG tablet Take 1 tablet (20 mg) by mouth daily 90 tablet 1     levonorgestrel (MIRENA) 20 MCG/24HR IUD 1 each (20 mcg) by Intrauterine route continuous       Allergies   Allergen Reactions     Bacitracin      Neosporin-Pt. Had eye swelling as a baby, Pt. States not even sure if she still has an allergy to neosporin     Bacitracin Zinc      Neosporin     Gramicidin D      Neosporin     Neomycin Sulfate      Neosporin     Polymyxin B      Neosporin     Polymyxin B Sulfate      Neosporin     Recent Labs   Lab Test  08/10/18   1018  01/17/18   1507  10/16/17   0523   10/14/17   1841  09/20/17   1050   A1C   --    --    --    --    --   5.6   ALT   --    --    --    --   11   --    CR  0.79   --   0.85   < >  1.11*   --    GFRESTIMATED  88   --   82   < >  60*   --    GFRESTBLACK  >90   --   >90   < >  73   --    POTASSIUM  4.0   --   4.1   < >  4.5   --    TSH   --   1.03   --    --    --    --     < > = values in this interval not displayed.      BP Readings from Last 3 Encounters:   09/05/18 120/70   08/10/18 104/64   05/22/18 100/70    Wt Readings from Last 3 Encounters:   09/05/18 136 lb (61.7 kg)   08/10/18 135 lb (61.2 kg)   05/22/18 139 lb (63 kg)                  Labs reviewed in EPIC    Reviewed and updated as needed this visit by clinical staff       Reviewed and updated as needed this visit by Provider         ROS:  Constitutional, HEENT, cardiovascular, pulmonary, gi and gu systems are negative, except as otherwise noted.    OBJECTIVE:     /70 (BP Location: Left arm, Patient Position: Sitting, Cuff Size: Adult Regular)  Pulse 64  Resp 14  Ht 5' (1.524 m)  Wt 136 lb (61.7 kg)  BMI 26.56 kg/m2  Body  mass index is 26.56 kg/(m^2).       GENERAL: healthy, alert and no distress  EYES: Eyes grossly normal to inspection, PERRL and conjunctivae and sclerae normal  NECK: no adenopathy, no asymmetry, masses, or scars and thyroid normal to palpation  RESP: lungs clear to auscultation - no rales, rhonchi or wheezes  CV: regular rate and rhythm, normal S1 S2, no S3 or S4, no murmur, click or rub, no peripheral edema and peripheral pulses strong  MS: right scapular birder tenderness, spasm, tightness - right trapezius tightness  SKIN: no suspicious lesions or rashes        ASSESSMENT/PLAN:     1. Spasm of back muscles  - Epsom salt soak  - Anti-inflammatory PRN  - Massage  - Stretch    Follow-up if unimproved          Rosa Hanson NP  [facility]

## 2018-09-06 ASSESSMENT — PATIENT HEALTH QUESTIONNAIRE - PHQ9: SUM OF ALL RESPONSES TO PHQ QUESTIONS 1-9: 0

## 2018-09-06 ASSESSMENT — ANXIETY QUESTIONNAIRES: GAD7 TOTAL SCORE: 0

## 2018-09-19 ENCOUNTER — TRANSFERRED RECORDS (OUTPATIENT)
Dept: HEALTH INFORMATION MANAGEMENT | Facility: CLINIC | Age: 23
End: 2018-09-19

## 2018-10-23 ENCOUNTER — DOCUMENTATION ONLY (OUTPATIENT)
Dept: FAMILY MEDICINE | Facility: OTHER | Age: 23
End: 2018-10-23

## 2018-10-23 ENCOUNTER — TELEPHONE (OUTPATIENT)
Dept: FAMILY MEDICINE | Facility: OTHER | Age: 23
End: 2018-10-23

## 2018-10-23 DIAGNOSIS — F90.0 ATTENTION DEFICIT HYPERACTIVITY DISORDER (ADHD), PREDOMINANTLY INATTENTIVE TYPE: ICD-10-CM

## 2018-10-23 DIAGNOSIS — F41.9 ANXIETY: ICD-10-CM

## 2018-10-23 RX ORDER — ESCITALOPRAM OXALATE 20 MG/1
20 TABLET ORAL DAILY
Qty: 90 TABLET | Refills: 1 | Status: SHIPPED | OUTPATIENT
Start: 2018-10-23 | End: 2018-10-31

## 2018-10-23 RX ORDER — DEXTROAMPHETAMINE SACCHARATE, AMPHETAMINE ASPARTATE MONOHYDRATE, DEXTROAMPHETAMINE SULFATE AND AMPHETAMINE SULFATE 7.5; 7.5; 7.5; 7.5 MG/1; MG/1; MG/1; MG/1
30 CAPSULE, EXTENDED RELEASE ORAL DAILY
Qty: 30 CAPSULE | Refills: 0 | Status: SHIPPED | OUTPATIENT
Start: 2018-10-23 | End: 2019-01-11

## 2018-10-23 NOTE — TELEPHONE ENCOUNTER
Patient has an appointment on 10/31 but does not have enough medication to last her until then. Please advise.

## 2018-10-24 ENCOUNTER — TELEPHONE (OUTPATIENT)
Dept: FAMILY MEDICINE | Facility: OTHER | Age: 23
End: 2018-10-24

## 2018-10-24 NOTE — TELEPHONE ENCOUNTER
Patient here picking up script.She was asking  for someone to listen to her heart.She can feel it in her had and can feel that she is having heart palpations.Advised ED.She verbalized understanding.Please note.

## 2018-10-26 NOTE — PROGRESS NOTES
SUBJECTIVE:   Valeria Thibodeaux is a 23 year old female who presents to clinic today for the following health issues:            Anxiety Follow-Up    Status since last visit: No change    Other associated symptoms:None    Complicating factors:   Significant life event: No   Current substance abuse: None  Depression symptoms: No  Switching from Zoloft to Lexapro has helped a lot      BETSY-7        PHQ-9 SCORE 8/10/2018 2018 10/31/2018   Total Score - - -   Total Score 1 0 2     BETSY-7 SCORE 8/10/2018 2018 10/31/2018   Total Score 0 0 1           ADD Follow up  Symptoms have been present for: Longstanding, over 1 year  Aggravating factors: No  Relieving factors:  Medication as prescribed  Recent stressors:  No  Drug or alcohol use:  No  Past medications:  Se health history  Therapy: No  Psychiatric history: ADD  Focus at home or school:  Good  Ability to sequence tasks:  Good  Weight Stable  Yes  Suicidal Ideation or plan:  No  Evident abuse or misuse of medication: No  Request for early refills: No      Was out of lexapro for a few days and experienced heart rate issues         Wrist Pain - right  Post surgery,   Cyst removal  Saw  today -  still has pain and decreased ROM.  He has her trying a topical antiinflammatory          Amount of exercise or physical activity: None    Problems taking medications regularly: No    Medication side effects: none    Diet: regular (no restrictions)      Problem list and histories reviewed & adjusted, as indicated.  Additional history: as documented    Patient Active Problem List   Diagnosis     ADHD (attention deficit hyperactivity disorder)     Anxiety     Ganglion cyst of wrist, right     Past Surgical History:   Procedure Laterality Date      SECTION N/A 10/15/2017    Procedure:  SECTION;   Section;  Surgeon: Ramez Marquez MD;  Location: [facility]     ORTHOPEDIC SURGERY  2018    rt wrist , cyst removed     surgically repaired       blocked tear duct - left     TONSILLECTOMY      tonsillitis       Social History   Substance Use Topics     Smoking status: Former Smoker     Packs/day: 0.30     Years: 5.00     Types: Cigarettes     Smokeless tobacco: Never Used      Comment: tried to quit: no     Alcohol use Yes      Comment: occasionally     Family History   Problem Relation Age of Onset     Diabetes Maternal Grandfather      HEART DISEASE Maternal Grandfather      heart disease     Myocardial Infarction Paternal Aunt      Myocardial Infarction Paternal Grandfather      cause of death     Coronary Artery Disease Paternal Grandfather      Myocardial Infarction Paternal Uncle      Coronary Artery Disease Paternal Uncle      Coronary Artery Disease Paternal Aunt          Current Outpatient Prescriptions   Medication Sig Dispense Refill     amphetamine-dextroamphetamine (ADDERALL XR) 30 MG per 24 hr capsule Take 1 capsule (30 mg) by mouth daily 30 capsule 0     escitalopram (LEXAPRO) 20 MG tablet Take 1 tablet (20 mg) by mouth daily 90 tablet 1     levonorgestrel (MIRENA) 20 MCG/24HR IUD 1 each (20 mcg) by Intrauterine route continuous       Allergies   Allergen Reactions     Bacitracin      Neosporin-Pt. Had eye swelling as a baby, Pt. States not even sure if she still has an allergy to neosporin     Bacitracin Zinc      Neosporin     Gramicidin D      Neosporin     Neomycin Sulfate      Neosporin     Polymyxin B      Neosporin     Polymyxin B Sulfate      Neosporin     Recent Labs   Lab Test  08/10/18   1018  01/17/18   1507  10/16/17   0523   10/14/17   1841  09/20/17   1050   A1C   --    --    --    --    --   5.6   ALT   --    --    --    --   11   --    CR  0.79   --   0.85   < >  1.11*   --    GFRESTIMATED  88   --   82   < >  60*   --    GFRESTBLACK  >90   --   >90   < >  73   --    POTASSIUM  4.0   --   4.1   < >  4.5   --    TSH   --   1.03   --    --    --    --     < > = values in this interval not displayed.      BP Readings from Last 3  Encounters:   10/31/18 100/80   09/05/18 120/70   08/10/18 104/64    Wt Readings from Last 3 Encounters:   10/31/18 136 lb (61.7 kg)   09/05/18 136 lb (61.7 kg)   08/10/18 135 lb (61.2 kg)                  Labs reviewed in EPIC    Reviewed and updated as needed this visit by clinical staff  Tobacco  Allergies  Meds  Med Hx  Surg Hx  Fam Hx  Soc Hx      Reviewed and updated as needed this visit by Provider         ROS:  Constitutional, HEENT, cardiovascular, pulmonary, GI, , musculoskeletal, neuro, skin, endocrine and psych systems are negative, except as otherwise noted.    OBJECTIVE:     /80 (BP Location: Left arm, Patient Position: Sitting, Cuff Size: Adult Regular)  Pulse 96  Resp 14  Wt 136 lb (61.7 kg)  BMI 26.56 kg/m2  Body mass index is 26.56 kg/(m^2).       GENERAL: healthy, alert and no distress  EYES: Eyes grossly normal to inspection, PERRL and conjunctivae and sclerae normal  HENT: ear canals and TM's normal, nose and mouth without ulcers or lesions  NECK: no adenopathy, no asymmetry, masses, or scars and thyroid normal to palpation  RESP: lungs clear to auscultation - no rales, rhonchi or wheezes  CV: regular rate and rhythm, normal S1 S2, no S3 or S4, no murmur, click or rub, no peripheral edema and peripheral pulses strong  SKIN: no suspicious lesions or rashes  PSYCH: mentation appears normal, affect normal/bright        Mental Status Assessment:  Constitutional: awake, alert, and no apparent distress  Appearance:   Appropriate   Eye Contact:   Good   Psychomotor Behavior: Normal   Attitude:   Cooperative   Orientation:   All  Speech   Rate / Production: Normal    Volume:  Normal   Mood:    Normal  Affect:    Appropriate   Thought Content:  Clear   Thought Form:  Coherent  Logical   Insight:    Good         ASSESSMENT/PLAN:       1. Anxiety  - escitalopram (LEXAPRO) 20 MG tablet; Take 1 tablet (20 mg) by mouth daily  Dispense: 90 tablet; Refill: 1    2. Attention deficit  hyperactivity disorder (ADHD), predominantly inattentive type  - amphetamine-dextroamphetamine (ADDERALL XR) 30 MG per 24 hr capsule; Take 1 capsule (30 mg) by mouth daily  Dispense: 30 capsule; Refill: 0  - amphetamine-dextroamphetamine (ADDERALL XR) 30 MG per 24 hr capsule; Take 1 capsule (30 mg) by mouth daily  Dispense: 30 capsule; Refill: 0  - amphetamine-dextroamphetamine (ADDERALL XR) 30 MG per 24 hr capsule; Take 1 capsule (30 mg) by mouth daily  Dispense: 30 capsule; Refill: 0      Follow-up 6 months, sooner as needed      Rosa Hanson NP  [facility]

## 2018-10-31 ENCOUNTER — OFFICE VISIT (OUTPATIENT)
Dept: FAMILY MEDICINE | Facility: OTHER | Age: 23
End: 2018-10-31
Attending: NURSE PRACTITIONER
Payer: COMMERCIAL

## 2018-10-31 VITALS
WEIGHT: 136 LBS | HEART RATE: 96 BPM | RESPIRATION RATE: 14 BRPM | DIASTOLIC BLOOD PRESSURE: 80 MMHG | SYSTOLIC BLOOD PRESSURE: 100 MMHG | BODY MASS INDEX: 26.56 KG/M2

## 2018-10-31 DIAGNOSIS — F41.9 ANXIETY: Primary | ICD-10-CM

## 2018-10-31 DIAGNOSIS — F90.0 ATTENTION DEFICIT HYPERACTIVITY DISORDER (ADHD), PREDOMINANTLY INATTENTIVE TYPE: ICD-10-CM

## 2018-10-31 PROCEDURE — 99213 OFFICE O/P EST LOW 20 MIN: CPT | Performed by: NURSE PRACTITIONER

## 2018-10-31 RX ORDER — DEXTROAMPHETAMINE SACCHARATE, AMPHETAMINE ASPARTATE MONOHYDRATE, DEXTROAMPHETAMINE SULFATE AND AMPHETAMINE SULFATE 7.5; 7.5; 7.5; 7.5 MG/1; MG/1; MG/1; MG/1
30 CAPSULE, EXTENDED RELEASE ORAL DAILY
Qty: 30 CAPSULE | Refills: 0 | Status: CANCELLED | OUTPATIENT
Start: 2018-10-31

## 2018-10-31 RX ORDER — DEXTROAMPHETAMINE SACCHARATE, AMPHETAMINE ASPARTATE MONOHYDRATE, DEXTROAMPHETAMINE SULFATE AND AMPHETAMINE SULFATE 7.5; 7.5; 7.5; 7.5 MG/1; MG/1; MG/1; MG/1
30 CAPSULE, EXTENDED RELEASE ORAL DAILY
Qty: 30 CAPSULE | Refills: 0 | Status: SHIPPED | OUTPATIENT
Start: 2019-01-01 | End: 2019-01-11

## 2018-10-31 RX ORDER — DEXTROAMPHETAMINE SACCHARATE, AMPHETAMINE ASPARTATE MONOHYDRATE, DEXTROAMPHETAMINE SULFATE AND AMPHETAMINE SULFATE 7.5; 7.5; 7.5; 7.5 MG/1; MG/1; MG/1; MG/1
30 CAPSULE, EXTENDED RELEASE ORAL DAILY
Qty: 30 CAPSULE | Refills: 0 | Status: SHIPPED | OUTPATIENT
Start: 2018-12-01 | End: 2019-01-30

## 2018-10-31 RX ORDER — DEXTROAMPHETAMINE SACCHARATE, AMPHETAMINE ASPARTATE MONOHYDRATE, DEXTROAMPHETAMINE SULFATE AND AMPHETAMINE SULFATE 7.5; 7.5; 7.5; 7.5 MG/1; MG/1; MG/1; MG/1
30 CAPSULE, EXTENDED RELEASE ORAL DAILY
Qty: 30 CAPSULE | Refills: 0 | Status: SHIPPED | OUTPATIENT
Start: 2018-10-31 | End: 2019-01-30

## 2018-10-31 RX ORDER — ESCITALOPRAM OXALATE 20 MG/1
20 TABLET ORAL DAILY
Qty: 90 TABLET | Refills: 1 | Status: SHIPPED | OUTPATIENT
Start: 2018-10-31 | End: 2019-03-27

## 2018-10-31 ASSESSMENT — PAIN SCALES - GENERAL: PAINLEVEL: SEVERE PAIN (7)

## 2018-10-31 ASSESSMENT — ANXIETY QUESTIONNAIRES
3. WORRYING TOO MUCH ABOUT DIFFERENT THINGS: NOT AT ALL
1. FEELING NERVOUS, ANXIOUS, OR ON EDGE: SEVERAL DAYS
IF YOU CHECKED OFF ANY PROBLEMS ON THIS QUESTIONNAIRE, HOW DIFFICULT HAVE THESE PROBLEMS MADE IT FOR YOU TO DO YOUR WORK, TAKE CARE OF THINGS AT HOME, OR GET ALONG WITH OTHER PEOPLE: SOMEWHAT DIFFICULT
GAD7 TOTAL SCORE: 1
2. NOT BEING ABLE TO STOP OR CONTROL WORRYING: NOT AT ALL
4. TROUBLE RELAXING: NOT AT ALL
5. BEING SO RESTLESS THAT IT IS HARD TO SIT STILL: NOT AT ALL
6. BECOMING EASILY ANNOYED OR IRRITABLE: NOT AT ALL
7. FEELING AFRAID AS IF SOMETHING AWFUL MIGHT HAPPEN: NOT AT ALL

## 2018-10-31 ASSESSMENT — PATIENT HEALTH QUESTIONNAIRE - PHQ9: SUM OF ALL RESPONSES TO PHQ QUESTIONS 1-9: 2

## 2018-10-31 NOTE — MR AVS SNAPSHOT
After Visit Summary   10/31/2018    Valeria Thibodeaux    MRN: [id number]           Patient Information     Date Of Birth          1995        Visit Information        Provider Department      10/31/2018 11:00 AM Rosa Hanson NP [facility]        Today's Diagnoses     Anxiety    -  1    Attention deficit hyperactivity disorder (ADHD), predominantly inattentive type          Care Instructions        ASSESSMENT/PLAN:       1. Anxiety  - escitalopram (LEXAPRO) 20 MG tablet; Take 1 tablet (20 mg) by mouth daily  Dispense: 90 tablet; Refill: 1    2. Attention deficit hyperactivity disorder (ADHD), predominantly inattentive type  - amphetamine-dextroamphetamine (ADDERALL XR) 30 MG per 24 hr capsule; Take 1 capsule (30 mg) by mouth daily  Dispense: 30 capsule; Refill: 0  - amphetamine-dextroamphetamine (ADDERALL XR) 30 MG per 24 hr capsule; Take 1 capsule (30 mg) by mouth daily  Dispense: 30 capsule; Refill: 0  - amphetamine-dextroamphetamine (ADDERALL XR) 30 MG per 24 hr capsule; Take 1 capsule (30 mg) by mouth daily  Dispense: 30 capsule; Refill: 0      Follow-up 6 months, sooner as needed      Rosa Hanson NP  [facility]            Follow-ups after your visit        Who to contact     If you have questions or need follow up information about today's clinic visit or your schedule please contact [facility] directly at [phone].  Normal or non-critical lab and imaging results will be communicated to you by MyChart, letter or phone within 4 business days after the clinic has received the results. If you do not hear from us within 7 days, please contact the clinic through MyChart or phone. If you have a critical or abnormal lab result, we will notify you by phone as soon as possible.  Submit refill requests through [employer] or call your pharmacy and they will forward the refill request to us. Please allow 3 business days for your  refill to be completed.          Additional Information About Your Visit        [facility] Information     [facility] gives you secure access to your electronic health record. If you see a primary care provider, you can also send messages to your care team and make appointments. If you have questions, please call your primary care clinic.  If you do not have a primary care provider, please call [phone] and they will assist you.        Care EveryWhere ID     This is your Care EveryWhere ID. This could be used by other organizations to access your [place] medical records  [id number]        Your Vitals Were     Pulse Respirations BMI (Body Mass Index)             96 14 26.56 kg/m2          Blood Pressure from Last 3 Encounters:   10/31/18 100/80   09/05/18 120/70   08/10/18 104/64    Weight from Last 3 Encounters:   10/31/18 136 lb (61.7 kg)   09/05/18 136 lb (61.7 kg)   08/10/18 135 lb (61.2 kg)              Today, you had the following     No orders found for display         Today's Medication Changes          These changes are accurate as of 10/31/18 11:28 AM.  If you have any questions, ask your nurse or doctor.               These medicines have changed or have updated prescriptions.        Dose/Directions    * amphetamine-dextroamphetamine 30 MG per 24 hr capsule   Commonly known as:  ADDERALL XR   This may have changed:  Another medication with the same name was added. Make sure you understand how and when to take each.   Used for:  Attention deficit hyperactivity disorder (ADHD), predominantly inattentive type   Changed by:  Rosa Hanson NP        Dose:  30 mg   Take 1 capsule (30 mg) by mouth daily   Quantity:  30 capsule   Refills:  0       * amphetamine-dextroamphetamine 30 MG per 24 hr capsule   Commonly known as:  ADDERALL XR   This may have changed:  You were already taking a medication with the same name, and this prescription was added. Make sure you understand how and when to take each.   Used for:   Attention deficit hyperactivity disorder (ADHD), predominantly inattentive type   Changed by:  Rosa Hanson NP        Dose:  30 mg   Take 1 capsule (30 mg) by mouth daily   Quantity:  30 capsule   Refills:  0       * amphetamine-dextroamphetamine 30 MG per 24 hr capsule   Commonly known as:  ADDERALL XR   This may have changed:  You were already taking a medication with the same name, and this prescription was added. Make sure you understand how and when to take each.   Used for:  Attention deficit hyperactivity disorder (ADHD), predominantly inattentive type   Changed by:  Rosa Hanson NP        Dose:  30 mg   Start taking on:  12/1/2018   Take 1 capsule (30 mg) by mouth daily   Quantity:  30 capsule   Refills:  0       * amphetamine-dextroamphetamine 30 MG per 24 hr capsule   Commonly known as:  ADDERALL XR   This may have changed:  You were already taking a medication with the same name, and this prescription was added. Make sure you understand how and when to take each.   Used for:  Attention deficit hyperactivity disorder (ADHD), predominantly inattentive type   Changed by:  Rosa Hanson NP        Dose:  30 mg   Start taking on:  1/1/2019   Take 1 capsule (30 mg) by mouth daily   Quantity:  30 capsule   Refills:  0       * Notice:  This list has 4 medication(s) that are the same as other medications prescribed for you. Read the directions carefully, and ask your doctor or other care provider to review them with you.         Where to get your medicines      [id number]  [place], [place] - [address] AT [facility] [address]  [address], [place] [address]     Phone:  [phone]     escitalopram 20 MG tablet         Some of these will need a paper prescription and others can be bought over the counter.  Ask your nurse if you have questions.     Bring a paper prescription for each of these medications     amphetamine-dextroamphetamine 30 MG per  24 hr capsule    amphetamine-dextroamphetamine 30 MG per 24 hr capsule    amphetamine-dextroamphetamine 30 MG per 24 hr capsule                Primary Care Provider Office Phone # Fax #    Rosa Hanson [phone] [address]  [place] [address]        Equal Access to Services     [facility] LIZ : Hadii aad ku hadasho Soomaali, waaxda luqadaha, qaybta kaalmada adeegyada, waxay idiin hayaan adeeg kharash la'mireyan isela. So [facility] [phone].    ATENCIÓN: Si habla español, tiene a rodriguez disposición servicios gratuitos de asistencia lingüística. [facility] [phone].    We comply with applicable federal civil rights laws and [place] laws. We do not discriminate on the basis of race, color, national origin, age, disability, sex, sexual orientation, or gender identity.            Thank you!     Thank you for choosing [facility]  for your care. Our goal is always to provide you with excellent care. Hearing back from our patients is one way we can continue to improve our services. Please take a few minutes to complete the written survey that you may receive in the mail after your visit with us. Thank you!             Your Updated Medication List - Protect others around you: Learn how to safely use, store and throw away your medicines at www.disposemymeds.org.          This list is accurate as of 10/31/18 11:28 AM.  Always use your most recent med list.                   Brand Name Dispense Instructions for use Diagnosis    * amphetamine-dextroamphetamine 30 MG per 24 hr capsule    ADDERALL XR    30 capsule    Take 1 capsule (30 mg) by mouth daily    Attention deficit hyperactivity disorder (ADHD), predominantly inattentive type       * amphetamine-dextroamphetamine 30 MG per 24 hr capsule    ADDERALL XR    30 capsule    Take 1 capsule (30 mg) by mouth daily    Attention deficit hyperactivity disorder (ADHD), predominantly inattentive type       *  amphetamine-dextroamphetamine 30 MG per 24 hr capsule   Start taking on:  12/1/2018    ADDERALL XR    30 capsule    Take 1 capsule (30 mg) by mouth daily    Attention deficit hyperactivity disorder (ADHD), predominantly inattentive type       * amphetamine-dextroamphetamine 30 MG per 24 hr capsule   Start taking on:  1/1/2019    ADDERALL XR    30 capsule    Take 1 capsule (30 mg) by mouth daily    Attention deficit hyperactivity disorder (ADHD), predominantly inattentive type       escitalopram 20 MG tablet    LEXAPRO    90 tablet    Take 1 tablet (20 mg) by mouth daily    Anxiety       levonorgestrel 20 MCG/24HR IUD    MIRENA     1 each (20 mcg) by Intrauterine route continuous    Encounter for insertion of intrauterine contraceptive device (IUD)       * Notice:  This list has 4 medication(s) that are the same as other medications prescribed for you. Read the directions carefully, and ask your doctor or other care provider to review them with you.

## 2018-10-31 NOTE — PATIENT INSTRUCTIONS
ASSESSMENT/PLAN:       1. Anxiety  - escitalopram (LEXAPRO) 20 MG tablet; Take 1 tablet (20 mg) by mouth daily  Dispense: 90 tablet; Refill: 1    2. Attention deficit hyperactivity disorder (ADHD), predominantly inattentive type  - amphetamine-dextroamphetamine (ADDERALL XR) 30 MG per 24 hr capsule; Take 1 capsule (30 mg) by mouth daily  Dispense: 30 capsule; Refill: 0  - amphetamine-dextroamphetamine (ADDERALL XR) 30 MG per 24 hr capsule; Take 1 capsule (30 mg) by mouth daily  Dispense: 30 capsule; Refill: 0  - amphetamine-dextroamphetamine (ADDERALL XR) 30 MG per 24 hr capsule; Take 1 capsule (30 mg) by mouth daily  Dispense: 30 capsule; Refill: 0      Follow-up 6 months, sooner as needed      Rosa Hanson NP  [facility]

## 2018-10-31 NOTE — NURSING NOTE
Chief Complaint   Patient presents with     Anxiety     A.D.H.D     Musculoskeletal Problem       Initial /80 (BP Location: Left arm, Patient Position: Sitting, Cuff Size: Adult Regular)  Pulse 96  Resp 14  Wt 136 lb (61.7 kg)  BMI 26.56 kg/m2 Estimated body mass index is 26.56 kg/(m^2) as calculated from the following:    Height as of 9/5/18: 5' (1.524 m).    Weight as of this encounter: 136 lb (61.7 kg).  Medication Reconciliation: complete    Leah Sahu LPN

## 2018-11-01 ASSESSMENT — ANXIETY QUESTIONNAIRES: GAD7 TOTAL SCORE: 1

## 2019-01-10 PROBLEM — M67.431 GANGLION CYST OF WRIST, RIGHT: Status: RESOLVED | Noted: 2018-02-27 | Resolved: 2019-01-10

## 2019-01-10 NOTE — PROGRESS NOTES
SUBJECTIVE:   Valeria Thibodeaux is a 23 year old female who presents to clinic today for the following health issues:        SUBJECTIVE  Patient presents with a chief complaint of :        ADD Follow up  Symptoms have been present for: Longstanding, over 1 year  Aggravating factors: No  Relieving factors:  Medication as prescribed  Recent stressors:  No  Drug or alcohol use:  No  Past medications:  Se health history  Therapy: No  Psychiatric history: ADD  Focus at home or school:  Good  Ability to sequence tasks:  Good  Weight Stable  Yes  Suicidal Ideation or plan:  No  Evident abuse or misuse of medication: No  Request for early refills: No      Med wears off mid day.     She is on Adderall XR 30 mg daily - and would like to change to 20 mg and 10 mg, thus taking the same daily dose but dividing it.       Anxiety Follow-Up    Status since last visit: Improved, changed to Lexapro    Other associated symptoms:None    Complicating factors:   Significant life event: No   Current substance abuse: None  Depression symptoms: No    BETSY-7 SCORE 2018 10/31/2018 2019   Total Score 0 1 0       BETSY-7        Amount of exercise or physical activity: None    Problems taking medications regularly: No    Medication side effects: none    Diet: regular (no restrictions)         Problem list and histories reviewed & adjusted, as indicated.  Additional history: as documented    Patient Active Problem List   Diagnosis     ADHD (attention deficit hyperactivity disorder)     Anxiety     Past Surgical History:   Procedure Laterality Date      SECTION N/A 10/15/2017    Procedure:  SECTION;   Section;  Surgeon: Ramez Marquez MD;  Location: [facility]     ORTHOPEDIC SURGERY  2018    rt wrist , cyst removed     surgically repaired      blocked tear duct - left     TONSILLECTOMY      tonsillitis       Social History     Tobacco Use     Smoking status: Former Smoker     Packs/day: 0.30     Years: 5.00      Pack years: 1.50     Types: Cigarettes     Smokeless tobacco: Never Used     Tobacco comment: tried to quit: no   Substance Use Topics     Alcohol use: Yes     Comment: occasionally     Family History   Problem Relation Age of Onset     Diabetes Maternal Grandfather      Heart Disease Maternal Grandfather         heart disease     Myocardial Infarction Paternal Aunt      Myocardial Infarction Paternal Grandfather         cause of death     Coronary Artery Disease Paternal Grandfather      Myocardial Infarction Paternal Uncle      Coronary Artery Disease Paternal Uncle      Coronary Artery Disease Paternal Aunt          Current Outpatient Medications   Medication Sig Dispense Refill     amphetamine-dextroamphetamine (ADDERALL XR) 10 MG 24 hr capsule Take 1 capsule (10 mg) by mouth daily 30 capsule 0     [START ON 2/11/2019] amphetamine-dextroamphetamine (ADDERALL XR) 10 MG 24 hr capsule Take 1 capsule (10 mg) by mouth daily 30 capsule 0     [START ON 3/14/2019] amphetamine-dextroamphetamine (ADDERALL XR) 10 MG 24 hr capsule Take 1 capsule (10 mg) by mouth daily 30 capsule 0     amphetamine-dextroamphetamine (ADDERALL XR) 20 MG 24 hr capsule Take 1 capsule (20 mg) by mouth daily 30 capsule 0     [START ON 2/11/2019] amphetamine-dextroamphetamine (ADDERALL XR) 20 MG 24 hr capsule Take 1 capsule (20 mg) by mouth daily 30 capsule 0     [START ON 3/14/2019] amphetamine-dextroamphetamine (ADDERALL XR) 20 MG 24 hr capsule Take 1 capsule (20 mg) by mouth daily 30 capsule 0     escitalopram (LEXAPRO) 20 MG tablet Take 1 tablet (20 mg) by mouth daily 90 tablet 1     levonorgestrel (MIRENA) 20 MCG/24HR IUD 1 each (20 mcg) by Intrauterine route continuous       Allergies   Allergen Reactions     Bacitracin      Neosporin-Pt. Had eye swelling as a baby, Pt. States not even sure if she still has an allergy to neosporin     Bacitracin Zinc      Neosporin     Gramicidin D      Neosporin     Neomycin Sulfate      Neosporin      Polymyxin B      Neosporin     Polymyxin B Sulfate      Neosporin     Recent Labs   Lab Test 08/10/18  1018 01/17/18  1507 10/16/17  0523  10/14/17  1841 09/20/17  1050   A1C  --   --   --   --   --  5.6   ALT  --   --   --   --  11  --    CR 0.79  --  0.85   < > 1.11*  --    GFRESTIMATED 88  --  82   < > 60*  --    GFRESTBLACK >90  --  >90   < > 73  --    POTASSIUM 4.0  --  4.1   < > 4.5  --    TSH  --  1.03  --   --   --   --     < > = values in this interval not displayed.      BP Readings from Last 3 Encounters:   01/11/19 100/76   10/31/18 100/80   09/05/18 120/70    Wt Readings from Last 3 Encounters:   01/11/19 63 kg (139 lb)   10/31/18 61.7 kg (136 lb)   09/05/18 61.7 kg (136 lb)                  Labs reviewed in EPIC    Reviewed and updated as needed this visit by clinical staff  Tobacco  Allergies  Meds  Med Hx  Surg Hx  Fam Hx  Soc Hx      Reviewed and updated as needed this visit by Provider         ROS:  Constitutional, HEENT, cardiovascular, pulmonary, gi and gu systems are negative, except as otherwise noted.    OBJECTIVE:     /76 (BP Location: Right arm, Patient Position: Sitting, Cuff Size: Adult Regular)   Pulse 72   Resp 14   Wt 63 kg (139 lb)   BMI 27.15 kg/m    Body mass index is 27.15 kg/m .     GENERAL: healthy, alert and no distress  NECK: no adenopathy, no asymmetry, masses, or scars and thyroid normal to palpation  RESP: lungs clear to auscultation - no rales, rhonchi or wheezes  CV: regular rate and rhythm, normal S1 S2, no S3 or S4, no murmur, click or rub, no peripheral edema and peripheral pulses strong  SKIN: no suspicious lesions or rashes      Mental Status Assessment:  Constitutional: awake, alert, and no apparent distress  Appearance:   Appropriate   Eye Contact:   Good   Psychomotor Behavior: Normal   Attitude:   Cooperative   Orientation:   All  Speech   Rate / Production: Normal    Volume:  Normal   Mood:    Normal  Affect:    Appropriate   Thought  Content:  Clear   Thought Form:  Coherent  Logical   Insight:    Good           ASSESSMENT/PLAN:       1. Attention deficit hyperactivity disorder (ADHD), predominantly inattentive type  - amphetamine-dextroamphetamine (ADDERALL XR) 20 MG 24 hr capsule; Take 1 capsule (20 mg) by mouth daily  Dispense: 30 capsule; Refill: 0  - amphetamine-dextroamphetamine (ADDERALL XR) 20 MG 24 hr capsule; Take 1 capsule (20 mg) by mouth daily  Dispense: 30 capsule; Refill: 0  - amphetamine-dextroamphetamine (ADDERALL XR) 20 MG 24 hr capsule; Take 1 capsule (20 mg) by mouth daily  Dispense: 30 capsule; Refill: 0  - amphetamine-dextroamphetamine (ADDERALL XR) 10 MG 24 hr capsule; Take 1 capsule (10 mg) by mouth daily  Dispense: 30 capsule; Refill: 0  - amphetamine-dextroamphetamine (ADDERALL XR) 10 MG 24 hr capsule; Take 1 capsule (10 mg) by mouth daily  Dispense: 30 capsule; Refill: 0  - amphetamine-dextroamphetamine (ADDERALL XR) 10 MG 24 hr capsule; Take 1 capsule (10 mg) by mouth daily  Dispense: 30 capsule; Refill: 0    She will bring in the Rx for 30 mg to destroy          Rosa Hanson NP  [facility]

## 2019-01-11 ENCOUNTER — OFFICE VISIT (OUTPATIENT)
Dept: FAMILY MEDICINE | Facility: OTHER | Age: 24
End: 2019-01-11
Attending: NURSE PRACTITIONER
Payer: COMMERCIAL

## 2019-01-11 VITALS
WEIGHT: 139 LBS | SYSTOLIC BLOOD PRESSURE: 100 MMHG | HEART RATE: 72 BPM | RESPIRATION RATE: 14 BRPM | BODY MASS INDEX: 27.15 KG/M2 | DIASTOLIC BLOOD PRESSURE: 76 MMHG

## 2019-01-11 DIAGNOSIS — F90.0 ATTENTION DEFICIT HYPERACTIVITY DISORDER (ADHD), PREDOMINANTLY INATTENTIVE TYPE: Primary | ICD-10-CM

## 2019-01-11 PROCEDURE — 99214 OFFICE O/P EST MOD 30 MIN: CPT | Performed by: NURSE PRACTITIONER

## 2019-01-11 RX ORDER — DEXTROAMPHETAMINE SACCHARATE, AMPHETAMINE ASPARTATE MONOHYDRATE, DEXTROAMPHETAMINE SULFATE AND AMPHETAMINE SULFATE 5; 5; 5; 5 MG/1; MG/1; MG/1; MG/1
20 CAPSULE, EXTENDED RELEASE ORAL DAILY
Qty: 30 CAPSULE | Refills: 0 | Status: SHIPPED | OUTPATIENT
Start: 2019-02-11 | End: 2019-04-24

## 2019-01-11 RX ORDER — DEXTROAMPHETAMINE SACCHARATE, AMPHETAMINE ASPARTATE MONOHYDRATE, DEXTROAMPHETAMINE SULFATE AND AMPHETAMINE SULFATE 2.5; 2.5; 2.5; 2.5 MG/1; MG/1; MG/1; MG/1
10 CAPSULE, EXTENDED RELEASE ORAL DAILY
Qty: 30 CAPSULE | Refills: 0 | Status: SHIPPED | OUTPATIENT
Start: 2019-01-11 | End: 2019-04-24

## 2019-01-11 RX ORDER — DEXTROAMPHETAMINE SACCHARATE, AMPHETAMINE ASPARTATE MONOHYDRATE, DEXTROAMPHETAMINE SULFATE AND AMPHETAMINE SULFATE 2.5; 2.5; 2.5; 2.5 MG/1; MG/1; MG/1; MG/1
10 CAPSULE, EXTENDED RELEASE ORAL DAILY
Qty: 30 CAPSULE | Refills: 0 | Status: SHIPPED | OUTPATIENT
Start: 2019-03-14 | End: 2019-04-24

## 2019-01-11 RX ORDER — DEXTROAMPHETAMINE SACCHARATE, AMPHETAMINE ASPARTATE MONOHYDRATE, DEXTROAMPHETAMINE SULFATE AND AMPHETAMINE SULFATE 5; 5; 5; 5 MG/1; MG/1; MG/1; MG/1
20 CAPSULE, EXTENDED RELEASE ORAL DAILY
Qty: 30 CAPSULE | Refills: 0 | Status: SHIPPED | OUTPATIENT
Start: 2019-03-14 | End: 2019-04-24

## 2019-01-11 RX ORDER — DEXTROAMPHETAMINE SACCHARATE, AMPHETAMINE ASPARTATE MONOHYDRATE, DEXTROAMPHETAMINE SULFATE AND AMPHETAMINE SULFATE 2.5; 2.5; 2.5; 2.5 MG/1; MG/1; MG/1; MG/1
10 CAPSULE, EXTENDED RELEASE ORAL DAILY
Qty: 30 CAPSULE | Refills: 0 | Status: SHIPPED | OUTPATIENT
Start: 2019-02-11 | End: 2019-04-24

## 2019-01-11 RX ORDER — DEXTROAMPHETAMINE SACCHARATE, AMPHETAMINE ASPARTATE MONOHYDRATE, DEXTROAMPHETAMINE SULFATE AND AMPHETAMINE SULFATE 5; 5; 5; 5 MG/1; MG/1; MG/1; MG/1
20 CAPSULE, EXTENDED RELEASE ORAL DAILY
Qty: 30 CAPSULE | Refills: 0 | Status: SHIPPED | OUTPATIENT
Start: 2019-01-11 | End: 2019-04-16

## 2019-01-11 ASSESSMENT — ANXIETY QUESTIONNAIRES
1. FEELING NERVOUS, ANXIOUS, OR ON EDGE: NOT AT ALL
3. WORRYING TOO MUCH ABOUT DIFFERENT THINGS: NOT AT ALL
IF YOU CHECKED OFF ANY PROBLEMS ON THIS QUESTIONNAIRE, HOW DIFFICULT HAVE THESE PROBLEMS MADE IT FOR YOU TO DO YOUR WORK, TAKE CARE OF THINGS AT HOME, OR GET ALONG WITH OTHER PEOPLE: NOT DIFFICULT AT ALL
7. FEELING AFRAID AS IF SOMETHING AWFUL MIGHT HAPPEN: NOT AT ALL
GAD7 TOTAL SCORE: 0
6. BECOMING EASILY ANNOYED OR IRRITABLE: NOT AT ALL
5. BEING SO RESTLESS THAT IT IS HARD TO SIT STILL: NOT AT ALL
4. TROUBLE RELAXING: NOT AT ALL
2. NOT BEING ABLE TO STOP OR CONTROL WORRYING: NOT AT ALL

## 2019-01-11 ASSESSMENT — PATIENT HEALTH QUESTIONNAIRE - PHQ9: SUM OF ALL RESPONSES TO PHQ QUESTIONS 1-9: 2

## 2019-01-11 ASSESSMENT — PAIN SCALES - GENERAL: PAINLEVEL: NO PAIN (0)

## 2019-01-11 NOTE — PATIENT INSTRUCTIONS
ASSESSMENT/PLAN:       1. Attention deficit hyperactivity disorder (ADHD), predominantly inattentive type  - amphetamine-dextroamphetamine (ADDERALL XR) 20 MG 24 hr capsule; Take 1 capsule (20 mg) by mouth daily  Dispense: 30 capsule; Refill: 0  - amphetamine-dextroamphetamine (ADDERALL XR) 20 MG 24 hr capsule; Take 1 capsule (20 mg) by mouth daily  Dispense: 30 capsule; Refill: 0  - amphetamine-dextroamphetamine (ADDERALL XR) 20 MG 24 hr capsule; Take 1 capsule (20 mg) by mouth daily  Dispense: 30 capsule; Refill: 0  - amphetamine-dextroamphetamine (ADDERALL XR) 10 MG 24 hr capsule; Take 1 capsule (10 mg) by mouth daily  Dispense: 30 capsule; Refill: 0  - amphetamine-dextroamphetamine (ADDERALL XR) 10 MG 24 hr capsule; Take 1 capsule (10 mg) by mouth daily  Dispense: 30 capsule; Refill: 0  - amphetamine-dextroamphetamine (ADDERALL XR) 10 MG 24 hr capsule; Take 1 capsule (10 mg) by mouth daily  Dispense: 30 capsule; Refill: 0    She will bring in the Rx for 30 mg to destroy          Rosa Hanson NP  [facility]

## 2019-01-11 NOTE — NURSING NOTE
Chief Complaint   Patient presents with     Behavioral Problem     Anxiety       Initial /76 (BP Location: Right arm, Patient Position: Sitting, Cuff Size: Adult Regular)   Pulse 72   Resp 14   Wt 63 kg (139 lb)   BMI 27.15 kg/m   Estimated body mass index is 27.15 kg/m  as calculated from the following:    Height as of 9/5/18: 1.524 m (5').    Weight as of this encounter: 63 kg (139 lb).  Medication Reconciliation: complete    Leah Sahu LPN

## 2019-01-12 ASSESSMENT — ANXIETY QUESTIONNAIRES: GAD7 TOTAL SCORE: 0

## 2019-01-30 ENCOUNTER — HOSPITAL ENCOUNTER (OUTPATIENT)
Dept: ULTRASOUND IMAGING | Facility: HOSPITAL | Age: 24
Discharge: HOME | End: 2019-01-30
Attending: ADVANCED PRACTICE MIDWIFE | Admitting: ADVANCED PRACTICE MIDWIFE
Payer: COMMERCIAL

## 2019-01-30 ENCOUNTER — OFFICE VISIT (OUTPATIENT)
Dept: OBGYN | Facility: OTHER | Age: 24
End: 2019-01-30
Attending: ADVANCED PRACTICE MIDWIFE
Payer: COMMERCIAL

## 2019-01-30 VITALS
SYSTOLIC BLOOD PRESSURE: 107 MMHG | HEIGHT: 60 IN | BODY MASS INDEX: 27.29 KG/M2 | DIASTOLIC BLOOD PRESSURE: 64 MMHG | WEIGHT: 139 LBS

## 2019-01-30 DIAGNOSIS — B96.89 BV (BACTERIAL VAGINOSIS): ICD-10-CM

## 2019-01-30 DIAGNOSIS — R10.2 PELVIC PAIN IN FEMALE: ICD-10-CM

## 2019-01-30 DIAGNOSIS — Z11.3 SCREEN FOR STD (SEXUALLY TRANSMITTED DISEASE): ICD-10-CM

## 2019-01-30 DIAGNOSIS — N76.0 BV (BACTERIAL VAGINOSIS): ICD-10-CM

## 2019-01-30 DIAGNOSIS — R10.2 PELVIC PAIN IN FEMALE: Primary | ICD-10-CM

## 2019-01-30 LAB
SPECIMEN SOURCE: ABNORMAL
WET PREP SPEC: ABNORMAL

## 2019-01-30 PROCEDURE — 87210 SMEAR WET MOUNT SALINE/INK: CPT | Performed by: ADVANCED PRACTICE MIDWIFE

## 2019-01-30 PROCEDURE — 87591 N.GONORRHOEAE DNA AMP PROB: CPT | Performed by: ADVANCED PRACTICE MIDWIFE

## 2019-01-30 PROCEDURE — 99213 OFFICE O/P EST LOW 20 MIN: CPT | Performed by: ADVANCED PRACTICE MIDWIFE

## 2019-01-30 PROCEDURE — 76830 TRANSVAGINAL US NON-OB: CPT | Mod: TC

## 2019-01-30 PROCEDURE — 87491 CHLMYD TRACH DNA AMP PROBE: CPT | Performed by: ADVANCED PRACTICE MIDWIFE

## 2019-01-30 RX ORDER — METRONIDAZOLE 500 MG/1
2000 TABLET ORAL ONCE
Qty: 4 TABLET | Refills: 0 | Status: SHIPPED | OUTPATIENT
Start: 2019-01-30 | End: 2019-04-24

## 2019-01-30 ASSESSMENT — MIFFLIN-ST. JEOR: SCORE: 1297

## 2019-01-30 ASSESSMENT — PAIN SCALES - GENERAL: PAINLEVEL: MODERATE PAIN (4)

## 2019-01-30 NOTE — PATIENT INSTRUCTIONS
Return to office as needed.    Thank you for allowing Ryan SANDRA CNM and our OB team to participate in your care.  If you have a scheduling or an appointment question please contact [facility] Unit Coordinator at their direct line [phone].   ALL nursing questions or concerns can be directed to your OB nurse at: [phone] Susan Sanchez/Betzy

## 2019-01-30 NOTE — PROGRESS NOTES
Valeria Thibodeaux is a 23 year old female  Here with right sided pelvic pain on Saturday to Monday, then left sided since Tuesday.  Describes as cramping pain.  Rates pain at 4/10.  Positioning helps slightly.  No help with Ibuprofen.   Denies vaginal bleeding or discharge.  Sexually active.   Denies dysuria, urgency, or hematuria.      O:   /64 (BP Location: Left arm, Patient Position: Chair, Cuff Size: Adult Regular)   Ht 1.524 m (5')   Wt 63 kg (139 lb)   BMI 27.15 kg/m     Pleasant without acute distress.  Pelvic:  Vagina and vulva are normal;  no discharge is noted.    Cervix: normal without lesions. IUD strings visible  Uterus:  mobile, normal in size and shape without tenderness.  Adnexa: without masses or tenderness.    A:    Pelvic pain bilateral  Mirena IUD strings visualized    P:    Pelvic exam  Wet prep, GC/CT  Pelvic US    Total visit greater than 15 minutes with 10 minutes spent face to face counseling this patient pelvic pain, ovarian cysts, sexually transmitted infections, BV, screenings, ultrasound.    Ryan Hansen, JOCY, CNM

## 2019-01-30 NOTE — NURSING NOTE
Chief Complaint   Patient presents with     Pelvic Pain       Initial /64 (BP Location: Left arm, Patient Position: Chair, Cuff Size: Adult Regular)   Ht 1.524 m (5')   Wt 63 kg (139 lb)   BMI 27.15 kg/m   Estimated body mass index is 27.15 kg/m  as calculated from the following:    Height as of this encounter: 1.524 m (5').    Weight as of this encounter: 63 kg (139 lb).  Medication Reconciliation: complete    Laura Rodriguez LPN

## 2019-04-16 DIAGNOSIS — F90.0 ATTENTION DEFICIT HYPERACTIVITY DISORDER (ADHD), PREDOMINANTLY INATTENTIVE TYPE: ICD-10-CM

## 2019-04-16 RX ORDER — DEXTROAMPHETAMINE SACCHARATE, AMPHETAMINE ASPARTATE MONOHYDRATE, DEXTROAMPHETAMINE SULFATE AND AMPHETAMINE SULFATE 5; 5; 5; 5 MG/1; MG/1; MG/1; MG/1
20 CAPSULE, EXTENDED RELEASE ORAL DAILY
Qty: 30 CAPSULE | Refills: 0 | Status: SHIPPED | OUTPATIENT
Start: 2019-04-16 | End: 2019-10-11

## 2019-04-16 RX ORDER — DEXTROAMPHETAMINE SACCHARATE, AMPHETAMINE ASPARTATE MONOHYDRATE, DEXTROAMPHETAMINE SULFATE AND AMPHETAMINE SULFATE 2.5; 2.5; 2.5; 2.5 MG/1; MG/1; MG/1; MG/1
10 CAPSULE, EXTENDED RELEASE ORAL DAILY
Qty: 30 CAPSULE | Refills: 0 | Status: SHIPPED | OUTPATIENT
Start: 2019-04-16 | End: 2019-08-12

## 2019-04-16 NOTE — PROGRESS NOTES
SUBJECTIVE:   CC: Valeria Thibodeaux is an 24 year old woman who presents for preventive health visit.             Healthy Habits:    Do you get at least three servings of calcium containing foods daily (dairy, green leafy vegetables, etc.)? yes    Amount of exercise or daily activities, outside of work: 5 day(s) per week    Problems taking medications regularly No    Medication side effects: No    Have you had an eye exam in the past two years? no- Recommended to patient    Do you see a dentist twice per year? yes    Do you have sleep apnea, excessive snoring or daytime drowsiness?no           ADD Follow up  Symptoms have been present for: Longstanding, over 1 year  Aggravating factors: No  Relieving factors:  Medication as prescribed  Recent stressors:  No  Drug or alcohol use:  No  Past medications:  Se health history  Therapy: No  Psychiatric history: ADD  Focus at home or school:  Good  Ability to sequence tasks:  Good  Weight Stable  Yes  Suicidal Ideation or plan:  No  Evident abuse or misuse of medication: No  Request for early refills: No              Anxiety Follow-Up    Status since last visit: Improved since increasing escitalopram to 30 mg daily    Other associated symptoms:None    Complicating factors:   Significant life event: Yes-  Decreasing work to part time to spend more time at work      Current substance abuse: Alcohol 2 x week  Depression symptoms: No           Abuse: Current or Past(Physical, Sexual or Emotional)- No  Do you feel safe in your environment? Yes     Social History            Tobacco Use     Smoking status: Former Smoker       Packs/day: 0.30       Years: 5.00       Pack years: 1.50       Types: Cigarettes     Smokeless tobacco: Never Used     Tobacco comment: tried to quit: no   Substance Use Topics     Alcohol use: Yes       Comment: occasionally            If you drink alcohol do you typically have >3 drinks per day or >7 drinks per week? No                     Reviewed  orders with patient.  Reviewed health maintenance and updated orders accordingly - Yes  Labs reviewed in Epic          BP Readings from Last 3 Encounters:   19 107/64   19 100/76   10/31/18 100/80         Wt Readings from Last 3 Encounters:   19 63 kg (139 lb)   19 63 kg (139 lb)   10/31/18 61.7 kg (136 lb)                                             Patient Active Problem List   Diagnosis     ADHD (attention deficit hyperactivity disorder)     Anxiety     Pelvic pain in female     Past Surgical History           Past Surgical History:   Procedure Laterality Date      SECTION N/A 10/15/2017     Procedure:  SECTION;   Section;  Surgeon: Ramez Marquez MD;  Location: [facility]     ORTHOPEDIC SURGERY   2018     rt wrist , cyst removed     surgically repaired         blocked tear duct - left     TONSILLECTOMY         tonsillitis          Social History            Tobacco Use     Smoking status: Former Smoker       Packs/day: 0.30       Years: 5.00       Pack years: 1.50       Types: Cigarettes     Smokeless tobacco: Never Used     Tobacco comment: tried to quit: no   Substance Use Topics     Alcohol use: Yes       Comment: occasionally     Family History         Family History   Problem Relation Age of Onset     Diabetes Maternal Grandfather       Heart Disease Maternal Grandfather           heart disease     Myocardial Infarction Paternal Aunt       Myocardial Infarction Paternal Grandfather           cause of death     Coronary Artery Disease Paternal Grandfather       Myocardial Infarction Paternal Uncle       Coronary Artery Disease Paternal Uncle       Coronary Artery Disease Paternal Aunt               Current Outpatient Prescriptions          Current Outpatient Medications   Medication Sig Dispense Refill     escitalopram (LEXAPRO) 20 MG tablet 1.5 tabs po every day for 30 mg daily 135 tablet 1     levonorgestrel (MIRENA) 20 MCG/24HR IUD 1 each (20 mcg) by  Intrauterine route continuous                   Allergies   Allergen Reactions     Bacitracin         Neosporin-Pt. Had eye swelling as a baby, Pt. States not even sure if she still has an allergy to neosporin     Bacitracin Zinc         Neosporin     Gramicidin D         Neosporin     Neomycin Sulfate         Neosporin     Polymyxin B         Neosporin     Polymyxin B Sulfate         Neosporin               Recent Labs   Lab Test 08/10/18  1018 18  1507 10/16/17  0523   10/14/17  1841 17  1050   A1C  --   --   --   --   --  5.6   ALT  --   --   --   --  11  --    CR 0.79  --  0.85   < > 1.11*  --    GFRESTIMATED 88  --  82   < > 60*  --    GFRESTBLACK >90  --  >90   < > 73  --    POTASSIUM 4.0  --  4.1   < > 4.5  --    TSH  --  1.03  --   --   --   --     < > = values in this interval not displayed.        Mammogram not appropriate for this patient based on age.       Pertinent mammograms are reviewed under the imaging tab.  History of abnormal Pap smear: NO - age 21-29 PAP every 3 years recommended  PAP / HPV 2015   PAP NIL      Reviewed and updated as needed this visit by clinical staff           Reviewed and updated as needed this visit by Provider      Past Medical History        Past Medical History:   Diagnosis Date     Anxiety 2018     Ganglion cyst of wrist, right 2018     Tobacco dependence 2015         Past Surgical History         Past Surgical History:   Procedure Laterality Date      SECTION N/A 10/15/2017     Procedure:  SECTION;   Section;  Surgeon: Ramez Marquez MD;  Location: [facility]     ORTHOPEDIC SURGERY   2018     rt wrist , cyst removed     surgically repaired         blocked tear duct - left     TONSILLECTOMY         tonsillitis                          OB History    Para Term  AB Living   1 1 1 0 0 0   SAB TAB Ectopic Multiple Live Births      0 0 0 0 0          # Outcome Date GA Lbr Mark/2nd Weight Sex Delivery Anes PTL Lv    1 Term 10/15/17 40w6d   3.92 kg (8 lb 10.3 oz) F              Name: CEE GARCIA      Apgar1: 8  Apgar5: 9         ROS:  CONSTITUTIONAL: NEGATIVE for fever, chills, change in weight  INTEGUMENTARU/SKIN: NEGATIVE for worrisome rashes, moles or lesions  EYES: NEGATIVE for vision changes or irritation  ENT: NEGATIVE for ear, mouth and throat problems. Reports swollen lymph node to left side of neck x 1 week. Has had upper respiratory symptoms (sneezing and sinus drainage and sore throat for past week).   RESP: NEGATIVE for significant cough or SOB  BREAST: NEGATIVE for masses, tenderness or discharge  CV: NEGATIVE for chest pain, palpitations or peripheral edema  GI: NEGATIVE for nausea, abdominal pain, heartburn, or change in bowel habits   female: Has Mirena in and not getting mensus. No vaginal bleeding. Not getting periods. Hx of ovarian cysts to right ovary with rupture x 1.  Is having occasional cramping to right side still. Does notice occasional increased vaginal odor with mild increased discharge and is wanting STI screening for gonorrhea and chlamydia which is reasonable. Last negative screening was in January 2019.  MUSCULOSKELETAL: NEGATIVE for significant arthralgias or myalgia  NEURO: NEGATIVE for weakness, dizziness or paresthesias  PSYCHIATRIC: NEGATIVE for changes in mood or affect     OBJECTIVE:   Vital signs:  Temp: 97.6  F (36.4  C) Temp src: Tympanic BP: 110/70 Pulse: 98   Resp: 14 SpO2: 99 %     Height: 152.4 cm (5') Weight: 61.7 kg (136 lb)  Estimated body mass index is 26.56 kg/m  as calculated from the following:    Height as of this encounter: 1.524 m (5').    Weight as of this encounter: 61.7 kg (136 lb).       EXAM:  GENERAL: healthy, alert and no distress  EYES: Eyes grossly normal to inspection, PERRL and conjunctivae and sclerae normal  HENT: normal cephalic/atraumatic, right ear: occluded with wax, left ear: normal: Right upper aspect of TM visualized due to partial  occlusion with wax. no effusions, no erythema.  nose and mouth without ulcers or lesions, oropharynx clear and oral mucous membranes moist  NECK: shoddy anterior cervical chain adenopathy via palpation with tenderness to palpation on right side only. No cervical adenopathy to left side.  no asymmetry, masses, or scars and thyroid normal to palpation  RESP: lungs clear to auscultation - no rales, rhonchi or wheezes  CV: regular rate and rhythm, normal S1 S2, no S3 or S4, no murmur, click or rub, no peripheral edema and peripheral pulses strong  ABDOMEN: soft, nontender, no hepatosplenomegaly, no masses and bowel sounds normal   (female): normal female external genitalia, normal urethral meatus, vaginal mucosa, normal cervix/adnexa/uterus without masses or discharge. No cervical motion tenderness. IUD string present. no inguinal adenopathy/  MS: no gross musculoskeletal defects noted, no edema  SKIN: no suspicious lesions or rashes  NEURO: Normal strength and tone, mentation intact and speech normal  PSYCH: mentation appears normal, affect normal/bright          Mental Status Assessment:  Constitutional: awake, alert, and no apparent distress  Appearance:                            Appropriate   Eye Contact:                           Good   Psychomotor Behavior:          Normal   Attitude:                                   Cooperative   Orientation:                             All  Speech              Rate / Production:       Normal               Volume:                       Normal   Mood:                                      Normal  Affect:                                      Appropriate   Thought Content:                    Clear   Thought Form:                        Coherent  Logical   Insight:                                     Good            ASSESSMENT/PLAN:   1. Annual physical exam  - NEISSERIA GONORRHOEA PCR  - CHLAMYDIA TRACHOMATIS PCR    2. Screening for malignant neoplasm of cervix  - A pap thin  layer screen reflex to HPV if ASCUS - recommend age 25 - 29    3. Attention deficit hyperactivity disorder (ADHD), predominantly inattentive type  - amphetamine-dextroamphetamine (ADDERALL XR) 20 MG 24 hr capsule; Take 1 capsule (20 mg) by mouth daily  Dispense: 30 capsule; Refill: 0  - amphetamine-dextroamphetamine (ADDERALL XR) 20 MG 24 hr capsule; Take 1 capsule (20 mg) by mouth daily  Dispense: 30 capsule; Refill: 0  - amphetamine-dextroamphetamine (ADDERALL XR) 20 MG 24 hr capsule; Take 1 capsule (20 mg) by mouth daily  Dispense: 30 capsule; Refill: 0  - amphetamine-dextroamphetamine (ADDERALL XR) 10 MG 24 hr capsule; Take 1 capsule (10 mg) by mouth daily  Dispense: 30 capsule; Refill: 0  - amphetamine-dextroamphetamine (ADDERALL XR) 10 MG 24 hr capsule; Take 1 capsule (10 mg) by mouth daily  Dispense: 30 capsule; Refill: 0  - amphetamine-dextroamphetamine (ADDERALL XR) 10 MG 24 hr capsule; Take 1 capsule (10 mg) by mouth daily  Dispense: 30 capsule; Refill: 0    4. Anxiety  - Continue plan of care    5. Vaginal discharge  - Wet prep  - NEISSERIA GONORRHOEA PCR  - CHLAMYDIA TRACHOMATIS PCR        COUNSELING:   Reviewed preventive health counseling, as reflected in patient instructions       Regular exercise       Healthy diet/nutrition      Protected intercourse with regular use of condoms for prevention of STI         BP Readings from Last 1 Encounters:   01/30/19 107/64      Estimated body mass index is 27.15 kg/m  as calculated from the following:    Height as of 1/30/19: 1.524 m (5').    Weight as of 1/30/19: 63 kg (139 lb).             reports that she has quit smoking. Her smoking use included cigarettes. She has a 1.50 pack-year smoking history. She has never used smokeless tobacco.        Counseling Resources:  ATP IV Guidelines  Pooled Cohorts Equation Calculator  Breast Cancer Risk Calculator  FRAX Risk Assessment  ICSI Preventive Guidelines  Dietary Guidelines for Americans, 2010  USDA's MyPlate  ASA  Prophylaxis  Lung CA Screening         Cee Jimenez, RN  Student Family Nurse Practitioner      Rosa Hanson NP  [facility] [place] [facility]      Other Notes      All notes   Instructions        Preventive Health Recommendations  Female Ages 26 - 39  Yearly exam:   See your health care provider every year in order to    Review health changes.     Discuss preventive care.      Review your medicines if you your doctor has prescribed any.     Until age 30: Get a Pap test every three years (more often if you have had an abnormal result).     After age 30: Talk to your doctor about whether you should have a Pap test every 3 years or have a Pap test with HPV screening every 5 years.   You do not need a Pap test if your uterus was removed (hysterectomy) and you have not had cancer.  You should be tested each year for STDs (sexually transmitted diseases), if you're at risk.   Talk to your provider about how often to have your cholesterol checked.  If you are at risk for diabetes, you should have a diabetes test (fasting glucose).  Shots: Get a flu shot each year. Get a tetanus shot every 10 years.   Nutrition:     Eat at least 5 servings of fruits and vegetables each day.    Eat whole-grain bread, whole-wheat pasta and brown rice instead of white grains and rice.    Get adequate Calcium and Vitamin D.      Lifestyle    Exercise at least 150 minutes a week (30 minutes a day, 5 days of the week). This will help you control your weight and prevent disease.    Limit alcohol to one drink per day.    No smoking.     Wear sunscreen to prevent skin cancer.    See your dentist every six months for an exam and cleaning.          Additional Documentation     Encounter Info:    Billing Info,    History,    Allergies,    Detailed Report       Communications         Chart Routed to Leah Sahu LPN   Reviewed this Encounter     None

## 2019-04-16 NOTE — TELEPHONE ENCOUNTER
Pt was called to work this morning, rescheduled physical for 4-24-19.  Will need Adderall refilled before then

## 2019-04-24 ENCOUNTER — OFFICE VISIT (OUTPATIENT)
Dept: FAMILY MEDICINE | Facility: OTHER | Age: 24
End: 2019-04-24
Attending: NURSE PRACTITIONER
Payer: COMMERCIAL

## 2019-04-24 VITALS
RESPIRATION RATE: 14 BRPM | BODY MASS INDEX: 26.7 KG/M2 | WEIGHT: 136 LBS | HEIGHT: 60 IN | SYSTOLIC BLOOD PRESSURE: 110 MMHG | OXYGEN SATURATION: 99 % | TEMPERATURE: 97.6 F | HEART RATE: 98 BPM | DIASTOLIC BLOOD PRESSURE: 70 MMHG

## 2019-04-24 DIAGNOSIS — N89.8 VAGINAL DISCHARGE: ICD-10-CM

## 2019-04-24 DIAGNOSIS — Z00.00 ANNUAL PHYSICAL EXAM: Primary | ICD-10-CM

## 2019-04-24 DIAGNOSIS — N76.0 BV (BACTERIAL VAGINOSIS): ICD-10-CM

## 2019-04-24 DIAGNOSIS — B96.89 BV (BACTERIAL VAGINOSIS): ICD-10-CM

## 2019-04-24 DIAGNOSIS — Z12.4 SCREENING FOR MALIGNANT NEOPLASM OF CERVIX: ICD-10-CM

## 2019-04-24 DIAGNOSIS — F90.0 ATTENTION DEFICIT HYPERACTIVITY DISORDER (ADHD), PREDOMINANTLY INATTENTIVE TYPE: ICD-10-CM

## 2019-04-24 DIAGNOSIS — F41.9 ANXIETY: ICD-10-CM

## 2019-04-24 LAB
SPECIMEN SOURCE: ABNORMAL
WET PREP SPEC: ABNORMAL

## 2019-04-24 PROCEDURE — 87591 N.GONORRHOEAE DNA AMP PROB: CPT | Mod: ZL | Performed by: NURSE PRACTITIONER

## 2019-04-24 PROCEDURE — 87491 CHLMYD TRACH DNA AMP PROBE: CPT | Mod: ZL | Performed by: NURSE PRACTITIONER

## 2019-04-24 PROCEDURE — 88142 CYTOPATH C/V THIN LAYER: CPT | Performed by: NURSE PRACTITIONER

## 2019-04-24 PROCEDURE — 87210 SMEAR WET MOUNT SALINE/INK: CPT | Mod: ZL | Performed by: NURSE PRACTITIONER

## 2019-04-24 PROCEDURE — G0123 SCREEN CERV/VAG THIN LAYER: HCPCS | Mod: ZL | Performed by: NURSE PRACTITIONER

## 2019-04-24 PROCEDURE — 99395 PREV VISIT EST AGE 18-39: CPT | Performed by: NURSE PRACTITIONER

## 2019-04-24 RX ORDER — DEXTROAMPHETAMINE SACCHARATE, AMPHETAMINE ASPARTATE MONOHYDRATE, DEXTROAMPHETAMINE SULFATE AND AMPHETAMINE SULFATE 5; 5; 5; 5 MG/1; MG/1; MG/1; MG/1
20 CAPSULE, EXTENDED RELEASE ORAL DAILY
Qty: 30 CAPSULE | Refills: 0 | Status: SHIPPED | OUTPATIENT
Start: 2019-05-16 | End: 2019-08-12

## 2019-04-24 RX ORDER — DEXTROAMPHETAMINE SACCHARATE, AMPHETAMINE ASPARTATE MONOHYDRATE, DEXTROAMPHETAMINE SULFATE AND AMPHETAMINE SULFATE 2.5; 2.5; 2.5; 2.5 MG/1; MG/1; MG/1; MG/1
10 CAPSULE, EXTENDED RELEASE ORAL DAILY
Qty: 30 CAPSULE | Refills: 0 | Status: SHIPPED | OUTPATIENT
Start: 2019-07-17 | End: 2019-10-16

## 2019-04-24 RX ORDER — DEXTROAMPHETAMINE SACCHARATE, AMPHETAMINE ASPARTATE MONOHYDRATE, DEXTROAMPHETAMINE SULFATE AND AMPHETAMINE SULFATE 5; 5; 5; 5 MG/1; MG/1; MG/1; MG/1
20 CAPSULE, EXTENDED RELEASE ORAL DAILY
Qty: 30 CAPSULE | Refills: 0 | Status: SHIPPED | OUTPATIENT
Start: 2019-06-16 | End: 2019-08-12

## 2019-04-24 RX ORDER — DEXTROAMPHETAMINE SACCHARATE, AMPHETAMINE ASPARTATE MONOHYDRATE, DEXTROAMPHETAMINE SULFATE AND AMPHETAMINE SULFATE 2.5; 2.5; 2.5; 2.5 MG/1; MG/1; MG/1; MG/1
10 CAPSULE, EXTENDED RELEASE ORAL DAILY
Qty: 30 CAPSULE | Refills: 0 | Status: SHIPPED | OUTPATIENT
Start: 2019-06-16 | End: 2019-08-12

## 2019-04-24 RX ORDER — DEXTROAMPHETAMINE SACCHARATE, AMPHETAMINE ASPARTATE MONOHYDRATE, DEXTROAMPHETAMINE SULFATE AND AMPHETAMINE SULFATE 2.5; 2.5; 2.5; 2.5 MG/1; MG/1; MG/1; MG/1
10 CAPSULE, EXTENDED RELEASE ORAL DAILY
Qty: 30 CAPSULE | Refills: 0 | Status: SHIPPED | OUTPATIENT
Start: 2019-05-16 | End: 2019-08-12

## 2019-04-24 RX ORDER — METRONIDAZOLE 7.5 MG/G
1 GEL VAGINAL DAILY
Qty: 70 G | Refills: 0 | Status: SHIPPED | OUTPATIENT
Start: 2019-04-24 | End: 2019-10-16

## 2019-04-24 RX ORDER — DEXTROAMPHETAMINE SACCHARATE, AMPHETAMINE ASPARTATE MONOHYDRATE, DEXTROAMPHETAMINE SULFATE AND AMPHETAMINE SULFATE 5; 5; 5; 5 MG/1; MG/1; MG/1; MG/1
20 CAPSULE, EXTENDED RELEASE ORAL DAILY
Qty: 30 CAPSULE | Refills: 0 | Status: SHIPPED | OUTPATIENT
Start: 2019-07-17 | End: 2019-10-16

## 2019-04-24 ASSESSMENT — PATIENT HEALTH QUESTIONNAIRE - PHQ9
5. POOR APPETITE OR OVEREATING: NOT AT ALL
SUM OF ALL RESPONSES TO PHQ QUESTIONS 1-9: 2

## 2019-04-24 ASSESSMENT — MIFFLIN-ST. JEOR: SCORE: 1278.39

## 2019-04-24 ASSESSMENT — ANXIETY QUESTIONNAIRES
2. NOT BEING ABLE TO STOP OR CONTROL WORRYING: NOT AT ALL
IF YOU CHECKED OFF ANY PROBLEMS ON THIS QUESTIONNAIRE, HOW DIFFICULT HAVE THESE PROBLEMS MADE IT FOR YOU TO DO YOUR WORK, TAKE CARE OF THINGS AT HOME, OR GET ALONG WITH OTHER PEOPLE: SOMEWHAT DIFFICULT
1. FEELING NERVOUS, ANXIOUS, OR ON EDGE: SEVERAL DAYS
5. BEING SO RESTLESS THAT IT IS HARD TO SIT STILL: NOT AT ALL
3. WORRYING TOO MUCH ABOUT DIFFERENT THINGS: SEVERAL DAYS
7. FEELING AFRAID AS IF SOMETHING AWFUL MIGHT HAPPEN: NOT AT ALL
6. BECOMING EASILY ANNOYED OR IRRITABLE: NOT AT ALL
GAD7 TOTAL SCORE: 2

## 2019-04-24 ASSESSMENT — PAIN SCALES - GENERAL: PAINLEVEL: NO PAIN (0)

## 2019-04-24 NOTE — NURSING NOTE
Chief Complaint   Patient presents with     Physical     Anxiety       Initial /70 (BP Location: Right arm, Cuff Size: Adult Regular)   Pulse 98   Temp 97.6  F (36.4  C) (Tympanic)   Resp 14   Ht 1.524 m (5')   Wt 61.7 kg (136 lb)   SpO2 99%   BMI 26.56 kg/m   Estimated body mass index is 26.56 kg/m  as calculated from the following:    Height as of this encounter: 1.524 m (5').    Weight as of this encounter: 61.7 kg (136 lb).  Medication Reconciliation: complete    Deisy Dong LPN

## 2019-04-24 NOTE — RESULT ENCOUNTER NOTE
Clue cells are present  Metrogel vaginal sent to pharmacy    [facility]  [phone]

## 2019-04-25 ENCOUNTER — TELEPHONE (OUTPATIENT)
Dept: FAMILY MEDICINE | Facility: OTHER | Age: 24
End: 2019-04-25

## 2019-04-25 DIAGNOSIS — A74.9 CHLAMYDIA INFECTION: Primary | ICD-10-CM

## 2019-04-25 LAB
C TRACH DNA SPEC QL PROBE+SIG AMP: ABNORMAL
N GONORRHOEA DNA SPEC QL PROBE+SIG AMP: NOT DETECTED
SPECIMEN SOURCE: ABNORMAL

## 2019-04-25 RX ORDER — AZITHROMYCIN 500 MG/1
1000 TABLET, FILM COATED ORAL DAILY
Qty: 2 TABLET | Refills: 0 | Status: SHIPPED | OUTPATIENT
Start: 2019-04-25 | End: 2019-05-10

## 2019-04-25 ASSESSMENT — ANXIETY QUESTIONNAIRES: GAD7 TOTAL SCORE: 2

## 2019-04-25 NOTE — TELEPHONE ENCOUNTER
Rosa must have seen this result and treatment sent, was this sent to nurse [place] for patient notification?

## 2019-04-25 NOTE — TELEPHONE ENCOUNTER
DATE:  4/25/2019   TIME OF RECEIPT FROM LAB:  0918  LAB TEST:  chlamidya   LAB VALUE:  positive  RESULTS GIVEN WITH READ-BACK TO (PROVIDER):  ST JENNA, ESDRAS A  TIME LAB VALUE REPORTED TO PROVIDER:   0919

## 2019-04-25 NOTE — RESULT ENCOUNTER NOTE
Chlamydia positive  Zithromax 1 g for 1 dose sent to pharmacy.    Partners should be notified.    [facility]  [phone]

## 2019-05-02 ENCOUNTER — TELEPHONE (OUTPATIENT)
Dept: FAMILY MEDICINE | Facility: OTHER | Age: 24
End: 2019-05-02

## 2019-05-02 LAB
COPATH REPORT: NORMAL
PAP: NORMAL

## 2019-05-02 NOTE — TELEPHONE ENCOUNTER
Pt was tx'ed recently for Chlamydia, had sex with infected partner and possible was exposed again.  Re-treat or re-test????

## 2019-05-02 NOTE — TELEPHONE ENCOUNTER
No sex for 1 week  Retest in 1 week    Partner needs to see his primary and get treated.    [facility]  [phone]

## 2019-05-09 ENCOUNTER — TELEPHONE (OUTPATIENT)
Dept: FAMILY MEDICINE | Facility: OTHER | Age: 24
End: 2019-05-09

## 2019-05-09 DIAGNOSIS — A74.9 CHLAMYDIA INFECTION: ICD-10-CM

## 2019-05-09 DIAGNOSIS — A74.9 CHLAMYDIA INFECTION: Primary | ICD-10-CM

## 2019-05-09 PROCEDURE — 87591 N.GONORRHOEAE DNA AMP PROB: CPT | Mod: ZL

## 2019-05-09 PROCEDURE — 87491 CHLMYD TRACH DNA AMP PROBE: CPT | Mod: ZL

## 2019-05-10 ENCOUNTER — TELEPHONE (OUTPATIENT)
Dept: FAMILY MEDICINE | Facility: OTHER | Age: 24
End: 2019-05-10

## 2019-05-10 ENCOUNTER — ALLIED HEALTH/NURSE VISIT (OUTPATIENT)
Dept: FAMILY MEDICINE | Facility: OTHER | Age: 24
End: 2019-05-10
Attending: NURSE PRACTITIONER
Payer: MEDICAID

## 2019-05-10 DIAGNOSIS — A74.9 CHLAMYDIA INFECTION: ICD-10-CM

## 2019-05-10 DIAGNOSIS — A74.9 CHLAMYDIA: ICD-10-CM

## 2019-05-10 DIAGNOSIS — Z11.8 SPECIAL SCREENING EXAMINATION FOR CHLAMYDIAL DISEASE: Primary | ICD-10-CM

## 2019-05-10 PROCEDURE — 96372 THER/PROPH/DIAG INJ SC/IM: CPT

## 2019-05-10 RX ORDER — CEFTRIAXONE 1 G/1
1000 INJECTION, POWDER, FOR SOLUTION INTRAMUSCULAR; INTRAVENOUS ONCE
Qty: 10 ML | Refills: 0 | COMMUNITY
Start: 2019-05-10 | End: 2019-10-16

## 2019-05-10 RX ORDER — CEFTRIAXONE SODIUM 1 G
1 VIAL (EA) INJECTION ONCE
Status: COMPLETED | OUTPATIENT
Start: 2019-05-10 | End: 2019-05-10

## 2019-05-10 RX ORDER — AZITHROMYCIN 500 MG/1
1000 TABLET, FILM COATED ORAL DAILY
Qty: 2 TABLET | Refills: 0 | Status: SHIPPED | OUTPATIENT
Start: 2019-05-10 | End: 2019-06-10

## 2019-05-10 RX ADMIN — Medication 1 G: at 16:23

## 2019-05-10 NOTE — PROGRESS NOTES
Prior to injection, verified patient identity using patient's name and date of birth.  Due to injection administration, patient instructed to remain in clinic for 15 minutes  afterwards, and to report any adverse reaction to me immediately.    Leah Sahu

## 2019-05-10 NOTE — TELEPHONE ENCOUNTER
DATE:  5/10/2019   TIME OF RECEIPT FROM LAB:  9:59  LAB TEST:  Chalymidia  LAB VALUE:  Positive   RESULTS GIVEN WITH READ-BACK TO (PROVIDER):  Rosa Hanson  TIME LAB VALUE REPORTED TO PROVIDER:   10:00    Sarah Luu LPN

## 2019-05-10 NOTE — RESULT ENCOUNTER NOTE
Chlamydia again positive  Zithromax re-prescribed  NO sexual activity until clear  Partner NEEDS to be treated.  As this is a concurrent infection, have her come in for Rocephin 1 G IM also.    [facility]  [phone]

## 2019-06-10 DIAGNOSIS — F41.9 ANXIETY: ICD-10-CM

## 2019-06-10 RX ORDER — ESCITALOPRAM OXALATE 20 MG/1
TABLET ORAL
Qty: 135 TABLET | Refills: 1 | Status: SHIPPED | OUTPATIENT
Start: 2019-06-10 | End: 2019-10-11

## 2019-08-12 DIAGNOSIS — F90.0 ATTENTION DEFICIT HYPERACTIVITY DISORDER (ADHD), PREDOMINANTLY INATTENTIVE TYPE: ICD-10-CM

## 2019-08-12 RX ORDER — DEXTROAMPHETAMINE SACCHARATE, AMPHETAMINE ASPARTATE MONOHYDRATE, DEXTROAMPHETAMINE SULFATE AND AMPHETAMINE SULFATE 5; 5; 5; 5 MG/1; MG/1; MG/1; MG/1
20 CAPSULE, EXTENDED RELEASE ORAL DAILY
Qty: 30 CAPSULE | Refills: 0 | Status: SHIPPED | OUTPATIENT
Start: 2019-09-12 | End: 2019-10-16

## 2019-08-12 RX ORDER — DEXTROAMPHETAMINE SACCHARATE, AMPHETAMINE ASPARTATE MONOHYDRATE, DEXTROAMPHETAMINE SULFATE AND AMPHETAMINE SULFATE 2.5; 2.5; 2.5; 2.5 MG/1; MG/1; MG/1; MG/1
10 CAPSULE, EXTENDED RELEASE ORAL DAILY
Qty: 30 CAPSULE | Refills: 0 | Status: SHIPPED | OUTPATIENT
Start: 2019-09-12 | End: 2019-10-11

## 2019-08-12 RX ORDER — DEXTROAMPHETAMINE SACCHARATE, AMPHETAMINE ASPARTATE MONOHYDRATE, DEXTROAMPHETAMINE SULFATE AND AMPHETAMINE SULFATE 2.5; 2.5; 2.5; 2.5 MG/1; MG/1; MG/1; MG/1
10 CAPSULE, EXTENDED RELEASE ORAL DAILY
Qty: 30 CAPSULE | Refills: 0 | Status: SHIPPED | OUTPATIENT
Start: 2019-08-12 | End: 2019-10-16

## 2019-08-12 RX ORDER — DEXTROAMPHETAMINE SACCHARATE, AMPHETAMINE ASPARTATE MONOHYDRATE, DEXTROAMPHETAMINE SULFATE AND AMPHETAMINE SULFATE 2.5; 2.5; 2.5; 2.5 MG/1; MG/1; MG/1; MG/1
10 CAPSULE, EXTENDED RELEASE ORAL DAILY
Qty: 30 CAPSULE | Refills: 0 | Status: SHIPPED | OUTPATIENT
Start: 2019-10-13 | End: 2019-10-11

## 2019-08-12 RX ORDER — DEXTROAMPHETAMINE SACCHARATE, AMPHETAMINE ASPARTATE MONOHYDRATE, DEXTROAMPHETAMINE SULFATE AND AMPHETAMINE SULFATE 5; 5; 5; 5 MG/1; MG/1; MG/1; MG/1
20 CAPSULE, EXTENDED RELEASE ORAL DAILY
Qty: 30 CAPSULE | Refills: 0 | Status: SHIPPED | OUTPATIENT
Start: 2019-10-13 | End: 2019-10-11

## 2019-08-12 RX ORDER — DEXTROAMPHETAMINE SACCHARATE, AMPHETAMINE ASPARTATE MONOHYDRATE, DEXTROAMPHETAMINE SULFATE AND AMPHETAMINE SULFATE 5; 5; 5; 5 MG/1; MG/1; MG/1; MG/1
20 CAPSULE, EXTENDED RELEASE ORAL DAILY
Qty: 30 CAPSULE | Refills: 0 | Status: SHIPPED | OUTPATIENT
Start: 2019-08-12 | End: 2019-10-16

## 2019-08-12 RX ORDER — DEXTROAMPHETAMINE SACCHARATE, AMPHETAMINE ASPARTATE MONOHYDRATE, DEXTROAMPHETAMINE SULFATE AND AMPHETAMINE SULFATE 5; 5; 5; 5 MG/1; MG/1; MG/1; MG/1
20 CAPSULE, EXTENDED RELEASE ORAL DAILY
Qty: 30 CAPSULE | Refills: 0 | Status: CANCELLED | OUTPATIENT
Start: 2019-08-12

## 2019-08-12 RX ORDER — DEXTROAMPHETAMINE SACCHARATE, AMPHETAMINE ASPARTATE MONOHYDRATE, DEXTROAMPHETAMINE SULFATE AND AMPHETAMINE SULFATE 2.5; 2.5; 2.5; 2.5 MG/1; MG/1; MG/1; MG/1
10 CAPSULE, EXTENDED RELEASE ORAL DAILY
Qty: 30 CAPSULE | Refills: 0 | Status: CANCELLED | OUTPATIENT
Start: 2019-08-12

## 2019-08-12 NOTE — TELEPHONE ENCOUNTER
Patient left message on nurses phone, requesting refill for her adderall.  Questioning if she needs an appointment, or if she can get 3 month supply.    Routed to provider for review.    Adderall XR 20mg Capsule     Last Written Prescription Date:  4/24/19  Last Fill Quantity: 30 capsule,   # refills: 0  Last Office Visit: 4/24/19  Future Office visit:          Adderall XR 10mg Capsule  Last Written Prescription Date:  4/24/19  Last Fill Quantity: 30 capsule,   # refills: 0  Last Office Visit: 4/24/19  Future Office visit:

## 2019-08-12 NOTE — TELEPHONE ENCOUNTER
Left voice message for patient to return call to schedule 3 month follow up appointment and prescription available for pickup at [facility].    Emilee Conde LPN on 8/12/2019 at 3:07 PM

## 2019-08-13 ENCOUNTER — TELEPHONE (OUTPATIENT)
Dept: FAMILY MEDICINE | Facility: OTHER | Age: 24
End: 2019-08-13

## 2019-08-13 NOTE — TELEPHONE ENCOUNTER
Pt called cannot fill medication its 2 days early according to barbra estrella per pcp to fill early talked to pharmacy and they are filling   Pamela M Lechevalier LPN

## 2019-10-10 DIAGNOSIS — F90.0 ATTENTION DEFICIT HYPERACTIVITY DISORDER (ADHD), PREDOMINANTLY INATTENTIVE TYPE: ICD-10-CM

## 2019-10-10 DIAGNOSIS — F41.9 ANXIETY: ICD-10-CM

## 2019-10-10 NOTE — TELEPHONE ENCOUNTER
Patient called stating she will be out of town this weekend and is wondering if she can have her medication refilled a day early. She has an appointment with Rosa Hanson on 10/16/19 to review her medications but she won't have enough to last until then. Ashley A. Lechevalier, LPN on 10/10/2019 at 4:38 PM

## 2019-10-11 RX ORDER — DEXTROAMPHETAMINE SACCHARATE, AMPHETAMINE ASPARTATE MONOHYDRATE, DEXTROAMPHETAMINE SULFATE AND AMPHETAMINE SULFATE 2.5; 2.5; 2.5; 2.5 MG/1; MG/1; MG/1; MG/1
10 CAPSULE, EXTENDED RELEASE ORAL DAILY
Qty: 30 CAPSULE | Refills: 0 | Status: SHIPPED | OUTPATIENT
Start: 2019-10-13 | End: 2020-03-12

## 2019-10-11 RX ORDER — DEXTROAMPHETAMINE SACCHARATE, AMPHETAMINE ASPARTATE MONOHYDRATE, DEXTROAMPHETAMINE SULFATE AND AMPHETAMINE SULFATE 2.5; 2.5; 2.5; 2.5 MG/1; MG/1; MG/1; MG/1
10 CAPSULE, EXTENDED RELEASE ORAL DAILY
Qty: 30 CAPSULE | Refills: 0 | Status: SHIPPED | OUTPATIENT
Start: 2019-10-11 | End: 2019-10-16

## 2019-10-11 RX ORDER — DEXTROAMPHETAMINE SACCHARATE, AMPHETAMINE ASPARTATE MONOHYDRATE, DEXTROAMPHETAMINE SULFATE AND AMPHETAMINE SULFATE 5; 5; 5; 5 MG/1; MG/1; MG/1; MG/1
20 CAPSULE, EXTENDED RELEASE ORAL DAILY
Qty: 30 CAPSULE | Refills: 0 | Status: SHIPPED | OUTPATIENT
Start: 2019-10-11 | End: 2019-10-16

## 2019-10-11 RX ORDER — ESCITALOPRAM OXALATE 20 MG/1
TABLET ORAL
Qty: 135 TABLET | Refills: 1 | Status: SHIPPED | OUTPATIENT
Start: 2019-10-11 | End: 2020-03-20

## 2019-10-11 RX ORDER — DEXTROAMPHETAMINE SACCHARATE, AMPHETAMINE ASPARTATE MONOHYDRATE, DEXTROAMPHETAMINE SULFATE AND AMPHETAMINE SULFATE 5; 5; 5; 5 MG/1; MG/1; MG/1; MG/1
20 CAPSULE, EXTENDED RELEASE ORAL DAILY
Qty: 30 CAPSULE | Refills: 0 | Status: SHIPPED | OUTPATIENT
Start: 2019-10-13 | End: 2020-02-10

## 2019-10-11 NOTE — TELEPHONE ENCOUNTER
Please clarify - Im not sure what I am supposed to do.  Enter refills with correct dates pls    [facility]  [phone]

## 2019-10-11 NOTE — TELEPHONE ENCOUNTER
Patient wondering if she could have the scripts written for start date today as she states she is going out of town today and will not be able to fill it.  Last fill date was 10/13/201    Scheduled office visit for 10/16/2019  Please advise   I still have script and informed patient I will not put out until I hear back from you.

## 2019-10-11 NOTE — TELEPHONE ENCOUNTER
Patient would like to fill medication today as she is going out of town tonight and thomast be able to fill on the 13th

## 2019-10-16 ENCOUNTER — OFFICE VISIT (OUTPATIENT)
Dept: FAMILY MEDICINE | Facility: OTHER | Age: 24
End: 2019-10-16
Attending: NURSE PRACTITIONER
Payer: COMMERCIAL

## 2019-10-16 VITALS
HEART RATE: 90 BPM | RESPIRATION RATE: 19 BRPM | OXYGEN SATURATION: 98 % | WEIGHT: 136 LBS | SYSTOLIC BLOOD PRESSURE: 126 MMHG | BODY MASS INDEX: 26.7 KG/M2 | TEMPERATURE: 98 F | HEIGHT: 60 IN | DIASTOLIC BLOOD PRESSURE: 74 MMHG

## 2019-10-16 DIAGNOSIS — F41.9 ANXIETY: ICD-10-CM

## 2019-10-16 DIAGNOSIS — Z11.3 SCREEN FOR STD (SEXUALLY TRANSMITTED DISEASE): ICD-10-CM

## 2019-10-16 DIAGNOSIS — F90.0 ATTENTION DEFICIT HYPERACTIVITY DISORDER (ADHD), PREDOMINANTLY INATTENTIVE TYPE: Primary | ICD-10-CM

## 2019-10-16 PROBLEM — R10.2 PELVIC PAIN IN FEMALE: Status: RESOLVED | Noted: 2019-01-30 | Resolved: 2019-10-16

## 2019-10-16 LAB
SPECIMEN SOURCE: NORMAL
WET PREP SPEC: NORMAL

## 2019-10-16 PROCEDURE — 36415 COLL VENOUS BLD VENIPUNCTURE: CPT | Mod: ZL | Performed by: NURSE PRACTITIONER

## 2019-10-16 PROCEDURE — G0463 HOSPITAL OUTPT CLINIC VISIT: HCPCS

## 2019-10-16 PROCEDURE — 87491 CHLMYD TRACH DNA AMP PROBE: CPT | Mod: ZL | Performed by: NURSE PRACTITIONER

## 2019-10-16 PROCEDURE — 87210 SMEAR WET MOUNT SALINE/INK: CPT | Mod: ZL | Performed by: NURSE PRACTITIONER

## 2019-10-16 PROCEDURE — 87591 N.GONORRHOEAE DNA AMP PROB: CPT | Mod: ZL | Performed by: NURSE PRACTITIONER

## 2019-10-16 PROCEDURE — 86780 TREPONEMA PALLIDUM: CPT | Mod: ZL | Performed by: NURSE PRACTITIONER

## 2019-10-16 PROCEDURE — 99214 OFFICE O/P EST MOD 30 MIN: CPT | Performed by: NURSE PRACTITIONER

## 2019-10-16 RX ORDER — DEXTROAMPHETAMINE SACCHARATE, AMPHETAMINE ASPARTATE MONOHYDRATE, DEXTROAMPHETAMINE SULFATE AND AMPHETAMINE SULFATE 5; 5; 5; 5 MG/1; MG/1; MG/1; MG/1
20 CAPSULE, EXTENDED RELEASE ORAL DAILY
Qty: 30 CAPSULE | Refills: 0 | Status: SHIPPED | OUTPATIENT
Start: 2019-12-11 | End: 2020-03-20

## 2019-10-16 RX ORDER — DEXTROAMPHETAMINE SACCHARATE, AMPHETAMINE ASPARTATE MONOHYDRATE, DEXTROAMPHETAMINE SULFATE AND AMPHETAMINE SULFATE 5; 5; 5; 5 MG/1; MG/1; MG/1; MG/1
20 CAPSULE, EXTENDED RELEASE ORAL DAILY
Qty: 30 CAPSULE | Refills: 0 | Status: SHIPPED | OUTPATIENT
Start: 2019-11-11 | End: 2020-03-20

## 2019-10-16 RX ORDER — DEXTROAMPHETAMINE SACCHARATE, AMPHETAMINE ASPARTATE MONOHYDRATE, DEXTROAMPHETAMINE SULFATE AND AMPHETAMINE SULFATE 5; 5; 5; 5 MG/1; MG/1; MG/1; MG/1
20 CAPSULE, EXTENDED RELEASE ORAL DAILY
Qty: 30 CAPSULE | Refills: 0 | Status: SHIPPED | OUTPATIENT
Start: 2020-01-11 | End: 2020-03-20

## 2019-10-16 RX ORDER — DEXTROAMPHETAMINE SACCHARATE, AMPHETAMINE ASPARTATE MONOHYDRATE, DEXTROAMPHETAMINE SULFATE AND AMPHETAMINE SULFATE 2.5; 2.5; 2.5; 2.5 MG/1; MG/1; MG/1; MG/1
10 CAPSULE, EXTENDED RELEASE ORAL DAILY
Qty: 30 CAPSULE | Refills: 0 | Status: SHIPPED | OUTPATIENT
Start: 2020-01-11 | End: 2020-02-10

## 2019-10-16 RX ORDER — DEXTROAMPHETAMINE SACCHARATE, AMPHETAMINE ASPARTATE MONOHYDRATE, DEXTROAMPHETAMINE SULFATE AND AMPHETAMINE SULFATE 2.5; 2.5; 2.5; 2.5 MG/1; MG/1; MG/1; MG/1
10 CAPSULE, EXTENDED RELEASE ORAL DAILY
Qty: 30 CAPSULE | Refills: 0 | Status: SHIPPED | OUTPATIENT
Start: 2019-11-11 | End: 2020-03-20

## 2019-10-16 RX ORDER — DEXTROAMPHETAMINE SACCHARATE, AMPHETAMINE ASPARTATE MONOHYDRATE, DEXTROAMPHETAMINE SULFATE AND AMPHETAMINE SULFATE 2.5; 2.5; 2.5; 2.5 MG/1; MG/1; MG/1; MG/1
10 CAPSULE, EXTENDED RELEASE ORAL DAILY
Qty: 30 CAPSULE | Refills: 0 | Status: SHIPPED | OUTPATIENT
Start: 2019-12-11 | End: 2020-03-20

## 2019-10-16 ASSESSMENT — PAIN SCALES - GENERAL: PAINLEVEL: NO PAIN (0)

## 2019-10-16 ASSESSMENT — ANXIETY QUESTIONNAIRES
GAD7 TOTAL SCORE: 1
3. WORRYING TOO MUCH ABOUT DIFFERENT THINGS: NOT AT ALL
1. FEELING NERVOUS, ANXIOUS, OR ON EDGE: SEVERAL DAYS
5. BEING SO RESTLESS THAT IT IS HARD TO SIT STILL: NOT AT ALL
2. NOT BEING ABLE TO STOP OR CONTROL WORRYING: NOT AT ALL
7. FEELING AFRAID AS IF SOMETHING AWFUL MIGHT HAPPEN: NOT AT ALL
IF YOU CHECKED OFF ANY PROBLEMS ON THIS QUESTIONNAIRE, HOW DIFFICULT HAVE THESE PROBLEMS MADE IT FOR YOU TO DO YOUR WORK, TAKE CARE OF THINGS AT HOME, OR GET ALONG WITH OTHER PEOPLE: NOT DIFFICULT AT ALL
6. BECOMING EASILY ANNOYED OR IRRITABLE: NOT AT ALL

## 2019-10-16 ASSESSMENT — PATIENT HEALTH QUESTIONNAIRE - PHQ9
SUM OF ALL RESPONSES TO PHQ QUESTIONS 1-9: 2
5. POOR APPETITE OR OVEREATING: NOT AT ALL

## 2019-10-16 ASSESSMENT — MIFFLIN-ST. JEOR: SCORE: 1278.39

## 2019-10-16 NOTE — NURSING NOTE
Chief Complaint   Patient presents with     A.D.H.D     Anxiety       Initial /74   Pulse 90   Temp 98  F (36.7  C) (Tympanic)   Resp 19   Ht 1.524 m (5')   Wt 61.7 kg (136 lb)   SpO2 98%   BMI 26.56 kg/m   Estimated body mass index is 26.56 kg/m  as calculated from the following:    Height as of this encounter: 1.524 m (5').    Weight as of this encounter: 61.7 kg (136 lb).  Medication Reconciliation: complete  Gilda Aiken LPN

## 2019-10-16 NOTE — PATIENT INSTRUCTIONS
Assessment & Plan     1. Attention deficit hyperactivity disorder (ADHD), predominantly inattentive type  - amphetamine-dextroamphetamine (ADDERALL XR) 20 MG 24 hr capsule; Take 1 capsule (20 mg) by mouth daily  Dispense: 30 capsule; Refill: 0  - amphetamine-dextroamphetamine (ADDERALL XR) 20 MG 24 hr capsule; Take 1 capsule (20 mg) by mouth daily  Dispense: 30 capsule; Refill: 0  - amphetamine-dextroamphetamine (ADDERALL XR) 20 MG 24 hr capsule; Take 1 capsule (20 mg) by mouth daily  Dispense: 30 capsule; Refill: 0  - amphetamine-dextroamphetamine (ADDERALL XR) 10 MG 24 hr capsule; Take 1 capsule (10 mg) by mouth daily  Dispense: 30 capsule; Refill: 0  - amphetamine-dextroamphetamine (ADDERALL XR) 10 MG 24 hr capsule; Take 1 capsule (10 mg) by mouth daily  Dispense: 30 capsule; Refill: 0  - amphetamine-dextroamphetamine (ADDERALL XR) 10 MG 24 hr capsule; Take 1 capsule (10 mg) by mouth daily  Dispense: 30 capsule; Refill: 0    2. Anxiety  - Continue plan of care    3. Screen for STD (sexually transmitted disease)  - GC/Chlamydia by PCR - [facility]  - Treponema Abs w Reflex to RPR and Titer  - Wet prep         Return in about 6 months (around 4/16/2020).       Rosa Hanson, [facility]

## 2019-10-17 ASSESSMENT — ANXIETY QUESTIONNAIRES: GAD7 TOTAL SCORE: 1

## 2019-10-18 LAB — T PALLIDUM AB SER QL: NONREACTIVE

## 2019-11-06 DIAGNOSIS — F41.9 ANXIETY: ICD-10-CM

## 2019-11-07 RX ORDER — ESCITALOPRAM OXALATE 20 MG/1
TABLET ORAL
Qty: 90 TABLET | Refills: 0 | OUTPATIENT
Start: 2019-11-07

## 2019-12-09 ENCOUNTER — TELEPHONE (OUTPATIENT)
Dept: FAMILY MEDICINE | Facility: OTHER | Age: 24
End: 2019-12-09

## 2019-12-09 NOTE — TELEPHONE ENCOUNTER
Patient informed. Stated that if there are any issues getting RX filled at the pharmacy to return call. Ashley A. Lechevalier, LPN on 12/9/2019 at 1:32 PM

## 2019-12-09 NOTE — TELEPHONE ENCOUNTER
Patient called stating she got 2 printed scripts for her adderall dated 11/11/19 and 12/11/19 but both are for the 10 mg adderall. She states she is supposed to have a printed script for both a 10 mg and 20 mg adderall dated for 12/11/19. She states she brings the scripts in every month to be filled when due and she will get them 3 months at a time. Ashley A. Lechevalier, LPN on 12/9/2019 at 12:05 PM

## 2019-12-09 NOTE — TELEPHONE ENCOUNTER
Dates are correct on bother her 20 mg and 10 mg according to Epic.    Rosa Hanson [facility]  [phone]

## 2020-02-10 DIAGNOSIS — F90.0 ATTENTION DEFICIT HYPERACTIVITY DISORDER (ADHD), PREDOMINANTLY INATTENTIVE TYPE: ICD-10-CM

## 2020-02-10 RX ORDER — DEXTROAMPHETAMINE SACCHARATE, AMPHETAMINE ASPARTATE MONOHYDRATE, DEXTROAMPHETAMINE SULFATE AND AMPHETAMINE SULFATE 5; 5; 5; 5 MG/1; MG/1; MG/1; MG/1
20 CAPSULE, EXTENDED RELEASE ORAL DAILY
Qty: 30 CAPSULE | Refills: 0 | Status: SHIPPED | OUTPATIENT
Start: 2020-02-10 | End: 2020-03-20

## 2020-02-10 RX ORDER — DEXTROAMPHETAMINE SACCHARATE, AMPHETAMINE ASPARTATE MONOHYDRATE, DEXTROAMPHETAMINE SULFATE AND AMPHETAMINE SULFATE 2.5; 2.5; 2.5; 2.5 MG/1; MG/1; MG/1; MG/1
10 CAPSULE, EXTENDED RELEASE ORAL DAILY
Qty: 30 CAPSULE | Refills: 0 | Status: SHIPPED | OUTPATIENT
Start: 2020-02-10 | End: 2020-03-20

## 2020-02-10 NOTE — TELEPHONE ENCOUNTER
Patient called requesting refills on her 10 mg and 20 mg Adderall, please send to [facility]. Ashley A. Lechevalier, LPN on 2/10/2020 at 2:41 PM

## 2020-03-12 DIAGNOSIS — F90.0 ATTENTION DEFICIT HYPERACTIVITY DISORDER (ADHD), PREDOMINANTLY INATTENTIVE TYPE: ICD-10-CM

## 2020-03-12 RX ORDER — DEXTROAMPHETAMINE SACCHARATE, AMPHETAMINE ASPARTATE MONOHYDRATE, DEXTROAMPHETAMINE SULFATE AND AMPHETAMINE SULFATE 2.5; 2.5; 2.5; 2.5 MG/1; MG/1; MG/1; MG/1
10 CAPSULE, EXTENDED RELEASE ORAL DAILY
Qty: 30 CAPSULE | Refills: 0 | Status: SHIPPED | OUTPATIENT
Start: 2020-03-12 | End: 2020-04-01

## 2020-03-12 NOTE — LETTER
[facility]  [address]  [address]  [place] [address]  [phone]  March 13, 2020    Valeria Busch [place] [address]    Dear Valeria,    I hope you are doing well.       This is a helpful reminder that you are due for a Wellness Visit (annual full physical).   So that you get your desired appointment time, please call to schedule this.    I have enclosed a list of your current health maintenance reminders and when they are due next.    If you have had this visit completed elsewhere, or you believe you received this letter in error, please contact us.    Have a great day!      Best Regards,      Rosa Hanson NP          Here is a list of your Health Maintenance reminders (e.g. - Q1 Yr (year)  means the test, or other advice, is recommended every year):    Health Maintenance Due   Topic Date Due     INFLUENZA VACCINE (1) 09/01/2019       We can discuss these reminders further at your appointment.

## 2020-03-12 NOTE — TELEPHONE ENCOUNTER
Patient is requesting a refill on her Adderall, last appointment 10/19. Patient verbalized she will make an appointment with Rosa next week but does not have enough medication to last until then.

## 2020-03-20 ENCOUNTER — VIRTUAL VISIT (OUTPATIENT)
Dept: FAMILY MEDICINE | Facility: OTHER | Age: 25
End: 2020-03-20
Attending: NURSE PRACTITIONER
Payer: COMMERCIAL

## 2020-03-20 ENCOUNTER — TELEPHONE (OUTPATIENT)
Dept: FAMILY MEDICINE | Facility: OTHER | Age: 25
End: 2020-03-20

## 2020-03-20 DIAGNOSIS — F41.9 ANXIETY: ICD-10-CM

## 2020-03-20 DIAGNOSIS — F90.0 ATTENTION DEFICIT HYPERACTIVITY DISORDER (ADHD), PREDOMINANTLY INATTENTIVE TYPE: Primary | ICD-10-CM

## 2020-03-20 PROCEDURE — 99213 OFFICE O/P EST LOW 20 MIN: CPT | Mod: 95 | Performed by: NURSE PRACTITIONER

## 2020-03-20 RX ORDER — ESCITALOPRAM OXALATE 20 MG/1
TABLET ORAL
Qty: 135 TABLET | Refills: 1 | Status: SHIPPED | OUTPATIENT
Start: 2020-03-20 | End: 2020-10-13

## 2020-03-20 RX ORDER — DEXTROAMPHETAMINE SACCHARATE, AMPHETAMINE ASPARTATE MONOHYDRATE, DEXTROAMPHETAMINE SULFATE AND AMPHETAMINE SULFATE 5; 5; 5; 5 MG/1; MG/1; MG/1; MG/1
20 CAPSULE, EXTENDED RELEASE ORAL DAILY
Qty: 30 CAPSULE | Refills: 0 | Status: SHIPPED | OUTPATIENT
Start: 2020-03-20 | End: 2020-06-08

## 2020-03-20 RX ORDER — DEXTROAMPHETAMINE SACCHARATE, AMPHETAMINE ASPARTATE MONOHYDRATE, DEXTROAMPHETAMINE SULFATE AND AMPHETAMINE SULFATE 5; 5; 5; 5 MG/1; MG/1; MG/1; MG/1
20 CAPSULE, EXTENDED RELEASE ORAL DAILY
Qty: 30 CAPSULE | Refills: 0 | Status: SHIPPED | OUTPATIENT
Start: 2020-05-21 | End: 2020-06-08

## 2020-03-20 RX ORDER — DEXTROAMPHETAMINE SACCHARATE, AMPHETAMINE ASPARTATE MONOHYDRATE, DEXTROAMPHETAMINE SULFATE AND AMPHETAMINE SULFATE 5; 5; 5; 5 MG/1; MG/1; MG/1; MG/1
20 CAPSULE, EXTENDED RELEASE ORAL DAILY
Qty: 30 CAPSULE | Refills: 0 | Status: SHIPPED | OUTPATIENT
Start: 2020-04-20 | End: 2020-06-08

## 2020-03-20 ASSESSMENT — ANXIETY QUESTIONNAIRES
6. BECOMING EASILY ANNOYED OR IRRITABLE: NOT AT ALL
3. WORRYING TOO MUCH ABOUT DIFFERENT THINGS: NOT AT ALL
5. BEING SO RESTLESS THAT IT IS HARD TO SIT STILL: NOT AT ALL
IF YOU CHECKED OFF ANY PROBLEMS ON THIS QUESTIONNAIRE, HOW DIFFICULT HAVE THESE PROBLEMS MADE IT FOR YOU TO DO YOUR WORK, TAKE CARE OF THINGS AT HOME, OR GET ALONG WITH OTHER PEOPLE: NOT DIFFICULT AT ALL
7. FEELING AFRAID AS IF SOMETHING AWFUL MIGHT HAPPEN: NOT AT ALL
1. FEELING NERVOUS, ANXIOUS, OR ON EDGE: NOT AT ALL
GAD7 TOTAL SCORE: 0
2. NOT BEING ABLE TO STOP OR CONTROL WORRYING: NOT AT ALL

## 2020-03-20 ASSESSMENT — PATIENT HEALTH QUESTIONNAIRE - PHQ9
SUM OF ALL RESPONSES TO PHQ QUESTIONS 1-9: 3
5. POOR APPETITE OR OVEREATING: NOT AT ALL

## 2020-03-20 NOTE — PROGRESS NOTES
"Valeria Thibodeaux is a 25 year old female who is being evaluated via a billable telephone visit.      The patient has been notified of following:     \"This telephone visit will be conducted via a call between you and your physician/provider. We have found that certain health care needs can be provided without the need for a physical exam.  This service lets us provide the care you need with a short phone conversation.  If a prescription is necessary we can send it directly to your pharmacy.  If lab work is needed we can place an order for that and you can then stop by our lab to have the test done at a later time.    If during the course of the call the physician/provider feels a telephone visit is not appropriate, you will not be charged for this service.\"     Valeria Thibodeaux complains of    Chief Complaint   Patient presents with     A.D.H.D       I have reviewed and updated the patient's Past Medical History, Social History, Family History and Medication List.    ALLERGIES  Bacitracin; Bacitracin zinc; Gramicidin d; Neomycin sulfate; Polymyxin b; and Polymyxin b sulfate        Medication Followup of Adderall    Taking Medication as prescribed: yes    Side Effects: none    Medication Helping Symptoms:  Yes but is noticing that the medication wearing off around 6 pm    LOV: 10/16/2016          Anxiety Follow-Up    How are you doing with your anxiety since your last visit? Improved     Are you having other symptoms that might be associated with anxiety? No    Have you had a significant life event? No     Are you feeling depressed? No    Do you have any concerns with your use of alcohol or other drugs? No       Social History     Tobacco Use     Smoking status: Former Smoker     Packs/day: 0.30     Years: 5.00     Pack years: 1.50     Types: Cigarettes     Smokeless tobacco: Never Used     Tobacco comment: tried to quit: no   Substance Use Topics     Alcohol use: Yes     Comment: occasionally     Drug use: No "         PHQ 2019 10/16/2019 3/20/2020   PHQ-9 Total Score 2 2 3   Q9: Thoughts of better off dead/self-harm past 2 weeks Not at all Not at all Not at all     BETSY-7 SCORE 2019 2019 10/16/2019   Total Score 0 2 1       Past Medical History:   Diagnosis Date     Anxiety 2018     Ganglion cyst of wrist, right 2018     Tobacco dependence 2015       Past Surgical History:   Procedure Laterality Date      SECTION N/A 10/15/2017    Procedure:  SECTION;   Section;  Surgeon: Ramez Marquez MD;  Location: [facility] OR     ORTHOPEDIC SURGERY  2018    rt wrist , cyst removed     surgically repaired      blocked tear duct - left     TONSILLECTOMY      tonsillitis       Family History   Problem Relation Age of Onset     Diabetes Maternal Grandfather      Heart Disease Maternal Grandfather         heart disease     Myocardial Infarction Paternal Aunt      Myocardial Infarction Paternal Grandfather         cause of death     Coronary Artery Disease Paternal Grandfather      Myocardial Infarction Paternal Uncle      Coronary Artery Disease Paternal Uncle      Coronary Artery Disease Paternal Aunt        Social History     Tobacco Use     Smoking status: Former Smoker     Packs/day: 0.30     Years: 5.00     Pack years: 1.50     Types: Cigarettes     Smokeless tobacco: Never Used     Tobacco comment: tried to quit: no   Substance Use Topics     Alcohol use: Yes     Comment: occasionally       Current Outpatient Medications   Medication     amphetamine-dextroamphetamine (ADDERALL XR) 20 MG 24 hr capsule     [START ON 2020] amphetamine-dextroamphetamine (ADDERALL XR) 20 MG 24 hr capsule     [START ON 2020] amphetamine-dextroamphetamine (ADDERALL XR) 20 MG 24 hr capsule     amphetamine-dextroamphetamine (ADDERALL XR) 10 MG 24 hr capsule     escitalopram (LEXAPRO) 20 MG tablet     levonorgestrel (MIRENA) 20 MCG/24HR IUD     No current facility-administered medications for this  visit.         Allergies   Allergen Reactions     Bacitracin      Neosporin-Pt. Had eye swelling as a baby, Pt. States not even sure if she still has an allergy to neosporin     Bacitracin Zinc      Neosporin     Gramicidin D      Neosporin     Neomycin Sulfate      Neosporin     Polymyxin B      Neosporin     Polymyxin B Sulfate      Neosporin       1. Attention deficit hyperactivity disorder (ADHD), predominantly inattentive type  - amphetamine-dextroamphetamine (ADDERALL XR) 20 MG 24 hr capsule; Take 1 capsule (20 mg) by mouth daily  Dispense: 30 capsule; Refill: 0  - amphetamine-dextroamphetamine (ADDERALL XR) 20 MG 24 hr capsule; Take 1 capsule (20 mg) by mouth daily  Dispense: 30 capsule; Refill: 0  - amphetamine-dextroamphetamine (ADDERALL XR) 20 MG 24 hr capsule; Take 1 capsule (20 mg) by mouth daily  Dispense: 30 capsule; Refill: 0  - Call when 10 mg dose is due for refill      2. Anxiety  - escitalopram (LEXAPRO) 20 MG tablet; 1.5 tabs po every day for 30 mg daily  Dispense: 135 tablet; Refill: 1      Phone call duration: 12 minutes      Rosa WHITLEYPHU  [phone]

## 2020-03-20 NOTE — TELEPHONE ENCOUNTER
Patient called stating she is due for a follow up appointment on her ADHD medications and that she only received a partial refill until an appointment could be scheduled. Offered patient telephone visit in place of coming to the office per Covid-19 guidelines. Patient scheduled for telephone visit today 3/20/2020 around 3:15 pm. Ashley A. Lechevalier, LPN on 3/20/2020 at 1:19 PM

## 2020-03-21 ASSESSMENT — ANXIETY QUESTIONNAIRES: GAD7 TOTAL SCORE: 0

## 2020-04-01 ENCOUNTER — TELEPHONE (OUTPATIENT)
Dept: FAMILY MEDICINE | Facility: OTHER | Age: 25
End: 2020-04-01

## 2020-04-01 DIAGNOSIS — F90.0 ATTENTION DEFICIT HYPERACTIVITY DISORDER (ADHD), PREDOMINANTLY INATTENTIVE TYPE: ICD-10-CM

## 2020-04-01 RX ORDER — DEXTROAMPHETAMINE SACCHARATE, AMPHETAMINE ASPARTATE MONOHYDRATE, DEXTROAMPHETAMINE SULFATE AND AMPHETAMINE SULFATE 2.5; 2.5; 2.5; 2.5 MG/1; MG/1; MG/1; MG/1
10 CAPSULE, EXTENDED RELEASE ORAL DAILY
Qty: 30 CAPSULE | Refills: 0 | Status: SHIPPED | OUTPATIENT
Start: 2020-04-01 | End: 2020-04-05

## 2020-04-01 NOTE — TELEPHONE ENCOUNTER
Adderall   Last Written Prescription Date: 3/12/20  Last Fill Quantity: 30 # of Refills: 0  Last Office Visit: 3/20/20

## 2020-04-01 NOTE — TELEPHONE ENCOUNTER
Please see multiple messages in patient's chart regarding this 10 mg dose of Adderall.  It looks like other messages have been routed regarding this issue.  Thank you.

## 2020-04-01 NOTE — TELEPHONE ENCOUNTER
Pt states she ran out of her adderall 10 mg early because she received her 20 mg adderall late and is supposed to be taking 30 mg a day. A refill request was sent in earlier today but the pharmacy said it was to early to get it because of insurance. She is wondering if there is anything she can do about this please give her a call back thank you.

## 2020-04-01 NOTE — TELEPHONE ENCOUNTER
Received a PA from [facility] for Escitalopram 20 mg tablets. Submitted on CMM. Waiting for a response.

## 2020-04-02 NOTE — TELEPHONE ENCOUNTER
Received an APPROVAL from [facility] for Escitalopram 20 mg tablets. Effective 01/01/2020 to 04/01/2021. Forms scanned to [facility].

## 2020-04-03 NOTE — TELEPHONE ENCOUNTER
I need to know what exactly she needs.  Please enter order so I know what I need to fill.    Rosa WHITLEYSHELBI  [phone]

## 2020-04-03 NOTE — TELEPHONE ENCOUNTER
Patient calling back again. States when Rosa was out on vacation she only got one of her adderall medications filled and she ran out faster because she was taking 3 of them. She normally takes a 20mg and 10mg. She is asking for early refill since she had to make those adjustments. Ashley A. Lechevalier, LPN on 4/3/2020 at 1:39 PM

## 2020-04-03 NOTE — TELEPHONE ENCOUNTER
She needs an early refill on her 10 mg Adderall. Medication and pharmacy pended. Ashley A. Lechevalier, LPN on 4/3/2020 at 3:12 PM

## 2020-04-05 RX ORDER — DEXTROAMPHETAMINE SACCHARATE, AMPHETAMINE ASPARTATE MONOHYDRATE, DEXTROAMPHETAMINE SULFATE AND AMPHETAMINE SULFATE 2.5; 2.5; 2.5; 2.5 MG/1; MG/1; MG/1; MG/1
10 CAPSULE, EXTENDED RELEASE ORAL DAILY
Qty: 30 CAPSULE | Refills: 0 | Status: SHIPPED | OUTPATIENT
Start: 2020-04-05 | End: 2020-10-13

## 2020-04-06 ENCOUNTER — TELEPHONE (OUTPATIENT)
Dept: FAMILY MEDICINE | Facility: OTHER | Age: 25
End: 2020-04-06

## 2020-04-06 NOTE — TELEPHONE ENCOUNTER
Patient calling again. States she just got off the phone with the pharmacy and they states that she is unable to get a refill on her Adderall 10 mg until 4/8/2020 and is refusing to fill this medication. Ashley A. Lechevalier, LPN on 4/6/2020 at 3:58 PM

## 2020-04-06 NOTE — TELEPHONE ENCOUNTER
Pt called stating pharmacy stated her prescription was delayed due to insurance purposes. She stated she thinks its due to the pharmacy thinking its to soon for her to get another prescription please advise

## 2020-04-07 NOTE — TELEPHONE ENCOUNTER
Spoke with pharmacy, they are able to fill script tomorrow. Attempted to call pt x2, busy signal. Will try again later.

## 2020-05-06 DIAGNOSIS — F41.9 ANXIETY: Primary | ICD-10-CM

## 2020-05-06 RX ORDER — ESCITALOPRAM OXALATE 10 MG/1
TABLET ORAL
Qty: 90 TABLET | Refills: 0 | Status: SHIPPED | OUTPATIENT
Start: 2020-05-06 | End: 2020-10-13

## 2020-06-08 DIAGNOSIS — F90.0 ATTENTION DEFICIT HYPERACTIVITY DISORDER (ADHD), PREDOMINANTLY INATTENTIVE TYPE: ICD-10-CM

## 2020-06-08 RX ORDER — DEXTROAMPHETAMINE SACCHARATE, AMPHETAMINE ASPARTATE MONOHYDRATE, DEXTROAMPHETAMINE SULFATE AND AMPHETAMINE SULFATE 5; 5; 5; 5 MG/1; MG/1; MG/1; MG/1
20 CAPSULE, EXTENDED RELEASE ORAL DAILY
Qty: 30 CAPSULE | Refills: 0 | Status: SHIPPED | OUTPATIENT
Start: 2020-06-21 | End: 2020-07-20

## 2020-06-08 RX ORDER — DEXTROAMPHETAMINE SACCHARATE, AMPHETAMINE ASPARTATE MONOHYDRATE, DEXTROAMPHETAMINE SULFATE AND AMPHETAMINE SULFATE 5; 5; 5; 5 MG/1; MG/1; MG/1; MG/1
20 CAPSULE, EXTENDED RELEASE ORAL DAILY
Qty: 30 CAPSULE | Refills: 0 | Status: SHIPPED | OUTPATIENT
Start: 2020-08-20 | End: 2020-09-21

## 2020-06-08 RX ORDER — DEXTROAMPHETAMINE SACCHARATE, AMPHETAMINE ASPARTATE MONOHYDRATE, DEXTROAMPHETAMINE SULFATE AND AMPHETAMINE SULFATE 5; 5; 5; 5 MG/1; MG/1; MG/1; MG/1
20 CAPSULE, EXTENDED RELEASE ORAL DAILY
Qty: 30 CAPSULE | Refills: 0 | Status: SHIPPED | OUTPATIENT
Start: 2020-07-21 | End: 2020-08-19

## 2020-06-08 NOTE — TELEPHONE ENCOUNTER
Patient called for 3 month supply, please advise.  Not due until 6/20/20.    amphetamine-dextroamphetamine (ADDERALL XR) 20 MG 24 hr capsule      Last Written Prescription Date:  5/21/20-6/20/20  Last Fill Quantity: 30,   # refills: 0  Last Office Visit: 3/20/20  Future Office visit:       Routing refill request to provider for review/approval because:  Drug not on the [facility], [facility] or [facility] refill protocol or controlled substance

## 2020-06-09 ENCOUNTER — TELEPHONE (OUTPATIENT)
Dept: FAMILY MEDICINE | Facility: OTHER | Age: 25
End: 2020-06-09

## 2020-06-09 DIAGNOSIS — F90.0 ATTENTION DEFICIT HYPERACTIVITY DISORDER (ADHD), PREDOMINANTLY INATTENTIVE TYPE: Primary | ICD-10-CM

## 2020-06-09 RX ORDER — DEXTROAMPHETAMINE SACCHARATE, AMPHETAMINE ASPARTATE MONOHYDRATE, DEXTROAMPHETAMINE SULFATE AND AMPHETAMINE SULFATE 5; 5; 5; 5 MG/1; MG/1; MG/1; MG/1
20 CAPSULE, EXTENDED RELEASE ORAL DAILY
Qty: 30 CAPSULE | Refills: 0 | Status: SHIPPED | OUTPATIENT
Start: 2020-08-14 | End: 2020-09-13

## 2020-06-09 RX ORDER — DEXTROAMPHETAMINE SACCHARATE, AMPHETAMINE ASPARTATE MONOHYDRATE, DEXTROAMPHETAMINE SULFATE AND AMPHETAMINE SULFATE 5; 5; 5; 5 MG/1; MG/1; MG/1; MG/1
20 CAPSULE, EXTENDED RELEASE ORAL DAILY
Qty: 30 CAPSULE | Refills: 0 | Status: SHIPPED | OUTPATIENT
Start: 2020-07-17 | End: 2020-08-16

## 2020-06-09 RX ORDER — DEXTROAMPHETAMINE SACCHARATE, AMPHETAMINE ASPARTATE, DEXTROAMPHETAMINE SULFATE AND AMPHETAMINE SULFATE 2.5; 2.5; 2.5; 2.5 MG/1; MG/1; MG/1; MG/1
10 TABLET ORAL DAILY
Qty: 30 TABLET | Refills: 0 | Status: SHIPPED | OUTPATIENT
Start: 2020-07-10 | End: 2020-06-10

## 2020-06-09 RX ORDER — DEXTROAMPHETAMINE SACCHARATE, AMPHETAMINE ASPARTATE, DEXTROAMPHETAMINE SULFATE AND AMPHETAMINE SULFATE 2.5; 2.5; 2.5; 2.5 MG/1; MG/1; MG/1; MG/1
10 TABLET ORAL DAILY
Qty: 30 TABLET | Refills: 0 | Status: SHIPPED | OUTPATIENT
Start: 2020-06-09 | End: 2020-06-10

## 2020-06-09 RX ORDER — DEXTROAMPHETAMINE SACCHARATE, AMPHETAMINE ASPARTATE, DEXTROAMPHETAMINE SULFATE AND AMPHETAMINE SULFATE 2.5; 2.5; 2.5; 2.5 MG/1; MG/1; MG/1; MG/1
10 TABLET ORAL DAILY
Qty: 30 TABLET | Refills: 0 | Status: SHIPPED | OUTPATIENT
Start: 2020-08-10 | End: 2020-06-10

## 2020-06-09 RX ORDER — DEXTROAMPHETAMINE SACCHARATE, AMPHETAMINE ASPARTATE MONOHYDRATE, DEXTROAMPHETAMINE SULFATE AND AMPHETAMINE SULFATE 5; 5; 5; 5 MG/1; MG/1; MG/1; MG/1
20 CAPSULE, EXTENDED RELEASE ORAL DAILY
Qty: 30 CAPSULE | Refills: 0 | Status: SHIPPED | OUTPATIENT
Start: 2020-06-18 | End: 2020-07-18

## 2020-06-09 NOTE — TELEPHONE ENCOUNTER
Pt called, need refill of 10mg adderall. Pended 3 month supply. 20mg dose was refilled for 3 month supply yesterday but start date listed as Sunday 6/21. I pended another 3 month supply with the correct start dates per . Scripts signed yesterday were cancelled.        Please advise. Thank you!

## 2020-06-10 RX ORDER — DEXTROAMPHETAMINE SACCHARATE, AMPHETAMINE ASPARTATE MONOHYDRATE, DEXTROAMPHETAMINE SULFATE AND AMPHETAMINE SULFATE 2.5; 2.5; 2.5; 2.5 MG/1; MG/1; MG/1; MG/1
10 CAPSULE, EXTENDED RELEASE ORAL DAILY
Qty: 30 CAPSULE | Refills: 0 | Status: SHIPPED | OUTPATIENT
Start: 2020-06-10 | End: 2020-07-10

## 2020-06-10 RX ORDER — DEXTROAMPHETAMINE SACCHARATE, AMPHETAMINE ASPARTATE MONOHYDRATE, DEXTROAMPHETAMINE SULFATE AND AMPHETAMINE SULFATE 2.5; 2.5; 2.5; 2.5 MG/1; MG/1; MG/1; MG/1
10 CAPSULE, EXTENDED RELEASE ORAL DAILY
Qty: 30 CAPSULE | Refills: 0 | Status: SHIPPED | OUTPATIENT
Start: 2020-07-11 | End: 2020-08-10

## 2020-06-10 RX ORDER — DEXTROAMPHETAMINE SACCHARATE, AMPHETAMINE ASPARTATE MONOHYDRATE, DEXTROAMPHETAMINE SULFATE AND AMPHETAMINE SULFATE 2.5; 2.5; 2.5; 2.5 MG/1; MG/1; MG/1; MG/1
10 CAPSULE, EXTENDED RELEASE ORAL DAILY
Qty: 30 CAPSULE | Refills: 0 | Status: SHIPPED | OUTPATIENT
Start: 2020-08-11 | End: 2020-09-08

## 2020-06-10 NOTE — TELEPHONE ENCOUNTER
Patient called clinic back. Patient states she pick Adderall prescription.  Picked up prescription was for Adderall 10 MG. Patient states she is supposed to be on Adderall XR 10 MG. Adderall XR 10 MG pended.

## 2020-09-08 ENCOUNTER — MYC REFILL (OUTPATIENT)
Dept: FAMILY MEDICINE | Facility: OTHER | Age: 25
End: 2020-09-08

## 2020-09-08 DIAGNOSIS — F90.0 ATTENTION DEFICIT HYPERACTIVITY DISORDER (ADHD), PREDOMINANTLY INATTENTIVE TYPE: ICD-10-CM

## 2020-09-08 RX ORDER — DEXTROAMPHETAMINE SACCHARATE, AMPHETAMINE ASPARTATE MONOHYDRATE, DEXTROAMPHETAMINE SULFATE AND AMPHETAMINE SULFATE 2.5; 2.5; 2.5; 2.5 MG/1; MG/1; MG/1; MG/1
10 CAPSULE, EXTENDED RELEASE ORAL DAILY
Qty: 30 CAPSULE | Refills: 0 | Status: SHIPPED | OUTPATIENT
Start: 2020-09-08 | End: 2020-10-07

## 2020-09-08 NOTE — TELEPHONE ENCOUNTER
adderall      Last Written Prescription Date:  4/5/20  Last Fill Quantity: 30,   # refills: 0  Last Office Visit: 3/20/20  Future Office visit:       Routing refill request to provider for review/approval because:  Drug not on the [facility], [facility] or [facility] refill protocol or controlled substance

## 2020-09-19 ENCOUNTER — MYC MEDICAL ADVICE (OUTPATIENT)
Dept: FAMILY MEDICINE | Facility: OTHER | Age: 25
End: 2020-09-19

## 2020-09-19 DIAGNOSIS — F90.0 ATTENTION DEFICIT HYPERACTIVITY DISORDER (ADHD), PREDOMINANTLY INATTENTIVE TYPE: ICD-10-CM

## 2020-09-21 ENCOUNTER — TELEPHONE (OUTPATIENT)
Dept: FAMILY MEDICINE | Facility: OTHER | Age: 25
End: 2020-09-21

## 2020-09-21 RX ORDER — DEXTROAMPHETAMINE SACCHARATE, AMPHETAMINE ASPARTATE MONOHYDRATE, DEXTROAMPHETAMINE SULFATE AND AMPHETAMINE SULFATE 5; 5; 5; 5 MG/1; MG/1; MG/1; MG/1
20 CAPSULE, EXTENDED RELEASE ORAL DAILY
Qty: 30 CAPSULE | Refills: 0 | Status: SHIPPED | OUTPATIENT
Start: 2020-09-21 | End: 2020-10-13

## 2020-09-21 NOTE — TELEPHONE ENCOUNTER
adderrall 20mg      Last Written Prescription Date:  6/8/2020  Last Fill Quantity: 30,   # refills: 0  Last Office Visit: 3/20/2020  Future Office visit:       Routing refill request to provider for review/approval because:

## 2020-09-21 NOTE — TELEPHONE ENCOUNTER
LVM for pt to call back to inform her about medication. See prior note for more detail.  Fiorella Calle LPN

## 2020-09-21 NOTE — TELEPHONE ENCOUNTER
Please advise r/t pt mychart msg about a refill. Med pending if approved.  Fiorella Calle LPN  RX Details in prior note.

## 2020-09-21 NOTE — TELEPHONE ENCOUNTER
Diamphetamine 20 mg is needed for refill .  Pt uses [facility] [place]      Please call pt and advise status of refill order by SYDNEE Hanson.

## 2020-10-13 ENCOUNTER — OFFICE VISIT (OUTPATIENT)
Dept: FAMILY MEDICINE | Facility: OTHER | Age: 25
End: 2020-10-13
Attending: NURSE PRACTITIONER
Payer: COMMERCIAL

## 2020-10-13 VITALS
BODY MASS INDEX: 28.07 KG/M2 | SYSTOLIC BLOOD PRESSURE: 126 MMHG | WEIGHT: 143 LBS | HEIGHT: 60 IN | OXYGEN SATURATION: 99 % | HEART RATE: 88 BPM | TEMPERATURE: 98.2 F | DIASTOLIC BLOOD PRESSURE: 62 MMHG

## 2020-10-13 DIAGNOSIS — F90.0 ATTENTION DEFICIT HYPERACTIVITY DISORDER (ADHD), PREDOMINANTLY INATTENTIVE TYPE: Primary | ICD-10-CM

## 2020-10-13 DIAGNOSIS — F41.9 ANXIETY: ICD-10-CM

## 2020-10-13 PROCEDURE — 99214 OFFICE O/P EST MOD 30 MIN: CPT | Performed by: NURSE PRACTITIONER

## 2020-10-13 PROCEDURE — G0463 HOSPITAL OUTPT CLINIC VISIT: HCPCS | Mod: 25

## 2020-10-13 PROCEDURE — G0463 HOSPITAL OUTPT CLINIC VISIT: HCPCS

## 2020-10-13 RX ORDER — DEXTROAMPHETAMINE SACCHARATE, AMPHETAMINE ASPARTATE MONOHYDRATE, DEXTROAMPHETAMINE SULFATE AND AMPHETAMINE SULFATE 5; 5; 5; 5 MG/1; MG/1; MG/1; MG/1
20 CAPSULE, EXTENDED RELEASE ORAL DAILY
Qty: 30 CAPSULE | Refills: 0 | Status: SHIPPED | OUTPATIENT
Start: 2020-12-14 | End: 2021-01-20

## 2020-10-13 RX ORDER — DEXTROAMPHETAMINE SACCHARATE, AMPHETAMINE ASPARTATE MONOHYDRATE, DEXTROAMPHETAMINE SULFATE AND AMPHETAMINE SULFATE 5; 5; 5; 5 MG/1; MG/1; MG/1; MG/1
20 CAPSULE, EXTENDED RELEASE ORAL DAILY
Qty: 30 CAPSULE | Refills: 0 | Status: CANCELLED | OUTPATIENT
Start: 2020-11-07

## 2020-10-13 RX ORDER — DEXTROAMPHETAMINE SACCHARATE, AMPHETAMINE ASPARTATE MONOHYDRATE, DEXTROAMPHETAMINE SULFATE AND AMPHETAMINE SULFATE 2.5; 2.5; 2.5; 2.5 MG/1; MG/1; MG/1; MG/1
10 CAPSULE, EXTENDED RELEASE ORAL DAILY
Qty: 30 CAPSULE | Refills: 0 | Status: SHIPPED | OUTPATIENT
Start: 2020-12-14 | End: 2021-01-13

## 2020-10-13 RX ORDER — DEXTROAMPHETAMINE SACCHARATE, AMPHETAMINE ASPARTATE, DEXTROAMPHETAMINE SULFATE AND AMPHETAMINE SULFATE 5; 5; 5; 5 MG/1; MG/1; MG/1; MG/1
20 TABLET ORAL DAILY
COMMUNITY
End: 2021-05-18

## 2020-10-13 RX ORDER — DEXTROAMPHETAMINE SACCHARATE, AMPHETAMINE ASPARTATE MONOHYDRATE, DEXTROAMPHETAMINE SULFATE AND AMPHETAMINE SULFATE 5; 5; 5; 5 MG/1; MG/1; MG/1; MG/1
20 CAPSULE, EXTENDED RELEASE ORAL DAILY
Qty: 30 CAPSULE | Refills: 0 | Status: SHIPPED | OUTPATIENT
Start: 2020-10-13 | End: 2020-11-12

## 2020-10-13 RX ORDER — DEXTROAMPHETAMINE SACCHARATE, AMPHETAMINE ASPARTATE MONOHYDRATE, DEXTROAMPHETAMINE SULFATE AND AMPHETAMINE SULFATE 2.5; 2.5; 2.5; 2.5 MG/1; MG/1; MG/1; MG/1
10 CAPSULE, EXTENDED RELEASE ORAL DAILY
Qty: 30 CAPSULE | Refills: 0 | Status: CANCELLED | OUTPATIENT
Start: 2020-12-07

## 2020-10-13 RX ORDER — DEXTROAMPHETAMINE SACCHARATE, AMPHETAMINE ASPARTATE MONOHYDRATE, DEXTROAMPHETAMINE SULFATE AND AMPHETAMINE SULFATE 5; 5; 5; 5 MG/1; MG/1; MG/1; MG/1
20 CAPSULE, EXTENDED RELEASE ORAL DAILY
Qty: 30 CAPSULE | Refills: 0 | Status: SHIPPED | OUTPATIENT
Start: 2020-11-13 | End: 2020-12-13

## 2020-10-13 RX ORDER — ESCITALOPRAM OXALATE 10 MG/1
TABLET ORAL
Qty: 90 TABLET | Refills: 0 | Status: SHIPPED | OUTPATIENT
Start: 2020-10-13 | End: 2021-03-22

## 2020-10-13 RX ORDER — ESCITALOPRAM OXALATE 20 MG/1
20 TABLET ORAL DAILY
Qty: 90 TABLET | Refills: 1 | Status: SHIPPED | OUTPATIENT
Start: 2020-10-13 | End: 2021-03-22

## 2020-10-13 RX ORDER — DEXTROAMPHETAMINE SACCHARATE, AMPHETAMINE ASPARTATE MONOHYDRATE, DEXTROAMPHETAMINE SULFATE AND AMPHETAMINE SULFATE 2.5; 2.5; 2.5; 2.5 MG/1; MG/1; MG/1; MG/1
10 CAPSULE, EXTENDED RELEASE ORAL DAILY
Qty: 30 CAPSULE | Refills: 0 | Status: SHIPPED | OUTPATIENT
Start: 2020-11-13 | End: 2020-12-13

## 2020-10-13 RX ORDER — DEXTROAMPHETAMINE SACCHARATE, AMPHETAMINE ASPARTATE MONOHYDRATE, DEXTROAMPHETAMINE SULFATE AND AMPHETAMINE SULFATE 2.5; 2.5; 2.5; 2.5 MG/1; MG/1; MG/1; MG/1
10 CAPSULE, EXTENDED RELEASE ORAL DAILY
Qty: 30 CAPSULE | Refills: 0 | Status: SHIPPED | OUTPATIENT
Start: 2020-10-13 | End: 2020-11-12

## 2020-10-13 ASSESSMENT — ANXIETY QUESTIONNAIRES
2. NOT BEING ABLE TO STOP OR CONTROL WORRYING: NOT AT ALL
3. WORRYING TOO MUCH ABOUT DIFFERENT THINGS: NOT AT ALL
5. BEING SO RESTLESS THAT IT IS HARD TO SIT STILL: NOT AT ALL
GAD7 TOTAL SCORE: 1
1. FEELING NERVOUS, ANXIOUS, OR ON EDGE: SEVERAL DAYS
4. TROUBLE RELAXING: NOT AT ALL
7. FEELING AFRAID AS IF SOMETHING AWFUL MIGHT HAPPEN: NOT AT ALL
6. BECOMING EASILY ANNOYED OR IRRITABLE: NOT AT ALL

## 2020-10-13 ASSESSMENT — PAIN SCALES - GENERAL: PAINLEVEL: NO PAIN (0)

## 2020-10-13 ASSESSMENT — MIFFLIN-ST. JEOR: SCORE: 1305.14

## 2020-10-13 ASSESSMENT — PATIENT HEALTH QUESTIONNAIRE - PHQ9: SUM OF ALL RESPONSES TO PHQ QUESTIONS 1-9: 3

## 2020-10-13 NOTE — NURSING NOTE
Chief Complaint   Patient presents with     Anxiety     Attention Deficit Disorder       Initial /62 (BP Location: Right arm, Patient Position: Chair, Cuff Size: Adult Regular)   Pulse 88   Temp 98.2  F (36.8  C)   Ht 1.524 m (5')   Wt 64.9 kg (143 lb)   SpO2 99%   BMI 27.93 kg/m   Estimated body mass index is 27.93 kg/m  as calculated from the following:    Height as of this encounter: 1.524 m (5').    Weight as of this encounter: 64.9 kg (143 lb).  Medication Reconciliation: complete  Gladis Lewis LPN

## 2020-10-13 NOTE — PATIENT INSTRUCTIONS
Assessment & Plan    1. Anxiety  - escitalopram (LEXAPRO) 10 MG tablet; TAKE 1 TABLET BY MOUTH EVERY DAY ALONG WITH 20MG TABLET  Dispense: 90 tablet; Refill: 0  - escitalopram (LEXAPRO) 20 MG tablet; Take 1 tablet (20 mg) by mouth daily  Dispense: 90 tablet; Refill: 1        2. Attention deficit hyperactivity disorder (ADHD), predominantly inattentive type  - amphetamine-dextroamphetamine (ADDERALL XR) 10 MG 24 hr capsule; Take 1 capsule (10 mg) by mouth daily  Dispense: 30 capsule; Refill: 0  - amphetamine-dextroamphetamine (ADDERALL XR) 10 MG 24 hr capsule; Take 1 capsule (10 mg) by mouth daily  Dispense: 30 capsule; Refill: 0  - amphetamine-dextroamphetamine (ADDERALL XR) 10 MG 24 hr capsule; Take 1 capsule (10 mg) by mouth daily  Dispense: 30 capsule; Refill: 0  - amphetamine-dextroamphetamine (ADDERALL XR) 20 MG 24 hr capsule; Take 1 capsule (20 mg) by mouth daily  Dispense: 30 capsule; Refill: 0  - amphetamine-dextroamphetamine (ADDERALL XR) 20 MG 24 hr capsule; Take 1 capsule (20 mg) by mouth daily  Dispense: 30 capsule; Refill: 0  - amphetamine-dextroamphetamine (ADDERALL XR) 20 MG 24 hr capsule; Take 1 capsule (20 mg) by mouth daily  Dispense: 30 capsule; Refill: 0        BMI:   Estimated body mass index is 27.93 kg/m  as calculated from the following:    Height as of this encounter: 1.524 m (5').    Weight as of this encounter: 64.9 kg (143 lb).           Return in about 6 months (around 4/13/2021) for Mood disorder BONIFACIO.        Rosa Hanson [facility]

## 2020-10-13 NOTE — PROGRESS NOTES
Valeria Thibodeaux is a 25 year old female who presents to clinic today for the following health issues:         Anxiety Follow-Up    How are you doing with your anxiety since your last visit? No change    Are you having other symptoms that might be associated with anxiety? No    Have you had a significant life event? No     Are you feeling depressed? No    Do you have any concerns with your use of alcohol or other drugs? No       ADD Follow up  Symptoms have been present for: Longstanding, over 1 year  Aggravating factors: No  Relieving factors:  Medication as prescribed  Recent stressors:  No  Drug or alcohol use:  No  Past medications:  Se health history  Therapy: No  Psychiatric history: ADD  Focus at home or school:  Good  Ability to sequence tasks:  Good  Weight Stable  Yes  Suicidal Ideation or plan:  No  Evident abuse or misuse of medication: No  Request for early refills: No        Social History     Tobacco Use     Smoking status: Current Some Day Smoker     Packs/day: 0.30     Years: 5.00     Pack years: 1.50     Types: Cigarettes     Smokeless tobacco: Never Used     Tobacco comment: tried to quit: no   Substance Use Topics     Alcohol use: Yes     Comment: occasionally     Drug use: No       BETSY-7 SCORE 10/16/2019 3/20/2020 10/13/2020   Total Score 1 0 1       PHQ 10/16/2019 3/20/2020 10/13/2020   PHQ-9 Total Score 2 3 3   Q9: Thoughts of better off dead/self-harm past 2 weeks Not at all Not at all Not at all       Last PHQ-9 10/13/2020   1.  Little interest or pleasure in doing things 0   2.  Feeling down, depressed, or hopeless 0   3.  Trouble falling or staying asleep, or sleeping too much 0   4.  Feeling tired or having little energy 2   5.  Poor appetite or overeating 0   6.  Feeling bad about yourself 0   7.  Trouble concentrating 1   8.  Moving slowly or restless 0   Q9: Thoughts of better off dead/self-harm past 2 weeks 0   PHQ-9 Total Score 3   Difficulty at work, home, or with people  Somewhat difficult         BETSY-7  10/13/2020   1. Feeling nervous, anxious, or on edge 1   2. Not being able to stop or control worrying 0   3. Worrying too much about different things 0   4. Trouble relaxing 0   5. Being so restless that it is hard to sit still 0   6. Becoming easily annoyed or irritable 0   7. Feeling afraid, as if something awful might happen 0   BETSY-7 Total Score 1   If you checked any problems, how difficult have they made it for you to do your work, take care of things at home, or get along with other people? -           How many servings of fruits and vegetables do you eat daily?  0-1    On average, how many sweetened beverages do you drink each day (Examples: soda, juice, sweet tea, etc.  Do NOT count diet or artificially sweetened beverages)?   2    How many days per week do you exercise enough to make your heart beat faster? 4    How many minutes a day do you exercise enough to make your heart beat faster? 30 - 60    How many days per week do you miss taking your medication? 0        Review of Systems   CONSTITUTIONAL: NEGATIVE for fever, chills, change in weight  ENT/MOUTH: NEGATIVE for ear, mouth and throat problems  RESP: NEGATIVE for significant cough or SOB  CV: NEGATIVE for chest pain, palpitations or peripheral edema      Objective    /62 (BP Location: Right arm, Patient Position: Chair, Cuff Size: Adult Regular)   Pulse 88   Temp 98.2  F (36.8  C)   Ht 1.524 m (5')   Wt 64.9 kg (143 lb)   SpO2 99%   BMI 27.93 kg/m    Body mass index is 27.93 kg/m .       Physical Exam   GENERAL: healthy, alert and no distress  RESP: lungs clear to auscultation - no rales, rhonchi or wheezes  CV: regular rate and rhythm, normal S1 S2, no S3 or S4, no murmur, click or rub, no peripheral edema and peripheral pulses strong  PSYCH: mentation appears normal, affect normal/bright        Mental Status Assessment:  Constitutional: awake, alert, and no apparent distress  Appearance:   Appropriate    Eye Contact:   Good   Psychomotor Behavior: Normal   Attitude:   Cooperative   Orientation:   All  Speech   Rate / Production: Normal    Volume:  Normal   Mood:    Normal  Affect:    Appropriate   Thought Content:  Clear   Thought Form:  Coherent  Logical   Insight:    Good             Assessment & Plan        1. Anxiety  - escitalopram (LEXAPRO) 10 MG tablet; TAKE 1 TABLET BY MOUTH EVERY DAY ALONG WITH 20MG TABLET  Dispense: 90 tablet; Refill: 0  - escitalopram (LEXAPRO) 20 MG tablet; Take 1 tablet (20 mg) by mouth daily  Dispense: 90 tablet; Refill: 1      2. Attention deficit hyperactivity disorder (ADHD), predominantly inattentive type  - amphetamine-dextroamphetamine (ADDERALL XR) 10 MG 24 hr capsule; Take 1 capsule (10 mg) by mouth daily  Dispense: 30 capsule; Refill: 0  - amphetamine-dextroamphetamine (ADDERALL XR) 10 MG 24 hr capsule; Take 1 capsule (10 mg) by mouth daily  Dispense: 30 capsule; Refill: 0  - amphetamine-dextroamphetamine (ADDERALL XR) 10 MG 24 hr capsule; Take 1 capsule (10 mg) by mouth daily  Dispense: 30 capsule; Refill: 0  - amphetamine-dextroamphetamine (ADDERALL XR) 20 MG 24 hr capsule; Take 1 capsule (20 mg) by mouth daily  Dispense: 30 capsule; Refill: 0  - amphetamine-dextroamphetamine (ADDERALL XR) 20 MG 24 hr capsule; Take 1 capsule (20 mg) by mouth daily  Dispense: 30 capsule; Refill: 0  - amphetamine-dextroamphetamine (ADDERALL XR) 20 MG 24 hr capsule; Take 1 capsule (20 mg) by mouth daily  Dispense: 30 capsule; Refill: 0        BMI:   Estimated body mass index is 27.93 kg/m  as calculated from the following:    Height as of this encounter: 1.524 m (5').    Weight as of this encounter: 64.9 kg (143 lb).           Return in about 6 months (around 4/13/2021) for Mood disorder BONIFACIO.      Rosa Hanson CNP  [facility]

## 2020-10-14 ASSESSMENT — ANXIETY QUESTIONNAIRES: GAD7 TOTAL SCORE: 1

## 2020-12-03 ENCOUNTER — OFFICE VISIT (OUTPATIENT)
Dept: OBGYN | Facility: OTHER | Age: 25
End: 2020-12-03
Attending: ADVANCED PRACTICE MIDWIFE
Payer: COMMERCIAL

## 2020-12-03 VITALS — SYSTOLIC BLOOD PRESSURE: 104 MMHG | HEIGHT: 60 IN | DIASTOLIC BLOOD PRESSURE: 60 MMHG | BODY MASS INDEX: 27.93 KG/M2

## 2020-12-03 DIAGNOSIS — R10.2 PELVIC PAIN IN FEMALE: Primary | ICD-10-CM

## 2020-12-03 PROCEDURE — 99212 OFFICE O/P EST SF 10 MIN: CPT | Performed by: ADVANCED PRACTICE MIDWIFE

## 2020-12-03 PROCEDURE — G0463 HOSPITAL OUTPT CLINIC VISIT: HCPCS

## 2020-12-03 ASSESSMENT — PAIN SCALES - GENERAL: PAINLEVEL: MILD PAIN (2)

## 2020-12-03 NOTE — PATIENT INSTRUCTIONS
Return to office for well woman care and as needed.    Thank you for allowing Ryan SANDRA CNM and our OB team to participate in your care.  If you have a scheduling or an appointment question please contact [facility] Coordinator at their direct line [phone].   ALL nursing questions or concerns can be directed to your OB nurse at: [phone] Susan Sanchez/Betzy

## 2020-12-03 NOTE — PROGRESS NOTES
Valeria Thibodeaux is a 25 year old female  Here today for pelvic discomfort mostly on the left ovary.  Reports history of ovarian cysts. Currently has an IUD.    O:   /60 (BP Location: Left arm, Patient Position: Chair, Cuff Size: Adult Regular)   Ht 1.524 m (5')   BMI 27.93 kg/m     Pleasant without acute distress    A:    Pelvic pain  Hx of ovarian cysts    P:    Pelvic US    Total visit greater than 10 minutes with 100% of time spent face to face counseling this patient    JOCY Aguero, NANCYM

## 2020-12-03 NOTE — NURSING NOTE
Chief Complaint   Patient presents with     Vaginal Problem       Initial /60 (BP Location: Left arm, Patient Position: Chair, Cuff Size: Adult Regular)   Ht 1.524 m (5')   BMI 27.93 kg/m   Estimated body mass index is 27.93 kg/m  as calculated from the following:    Height as of this encounter: 1.524 m (5').    Weight as of 10/13/20: 64.9 kg (143 lb).  Medication Reconciliation: complete  Laura Rodriguez LPN

## 2020-12-09 ENCOUNTER — HOSPITAL ENCOUNTER (OUTPATIENT)
Dept: ULTRASOUND IMAGING | Facility: HOSPITAL | Age: 25
Discharge: HOME | End: 2020-12-09
Attending: ADVANCED PRACTICE MIDWIFE | Admitting: ADVANCED PRACTICE MIDWIFE
Payer: COMMERCIAL

## 2020-12-09 DIAGNOSIS — R10.2 PELVIC PAIN IN FEMALE: ICD-10-CM

## 2020-12-09 PROCEDURE — 76856 US EXAM PELVIC COMPLETE: CPT

## 2021-01-20 ENCOUNTER — MYC REFILL (OUTPATIENT)
Dept: FAMILY MEDICINE | Facility: OTHER | Age: 26
End: 2021-01-20

## 2021-01-20 DIAGNOSIS — F90.0 ATTENTION DEFICIT HYPERACTIVITY DISORDER (ADHD), PREDOMINANTLY INATTENTIVE TYPE: ICD-10-CM

## 2021-01-20 RX ORDER — DEXTROAMPHETAMINE SACCHARATE, AMPHETAMINE ASPARTATE MONOHYDRATE, DEXTROAMPHETAMINE SULFATE AND AMPHETAMINE SULFATE 5; 5; 5; 5 MG/1; MG/1; MG/1; MG/1
20 CAPSULE, EXTENDED RELEASE ORAL DAILY
Qty: 30 CAPSULE | Refills: 0 | Status: SHIPPED | OUTPATIENT
Start: 2021-01-20 | End: 2021-05-18

## 2021-01-20 NOTE — TELEPHONE ENCOUNTER
adderall xr 20 mg  Last Visit 10/13/20  Last Fill 1/13/21  Next Visit not scheduled    Thank you

## 2021-01-20 NOTE — TELEPHONE ENCOUNTER
Patient called for med.  Med is historical.    amphetamine-dextroamphetamine (ADDERALL) 20 MG tablet      Last Written Prescription Date:    Last Fill Quantity: ,   # refills:   Last Office Visit: 10/13/20  Future Office visit:       Routing refill request to provider for review/approval because:  Medication is reported/historical

## 2021-03-22 DIAGNOSIS — F41.9 ANXIETY: ICD-10-CM

## 2021-03-22 RX ORDER — ESCITALOPRAM OXALATE 10 MG/1
TABLET ORAL
Qty: 90 TABLET | Refills: 0 | Status: SHIPPED | OUTPATIENT
Start: 2021-03-22 | End: 2021-05-18

## 2021-03-22 RX ORDER — ESCITALOPRAM OXALATE 20 MG/1
TABLET ORAL
Qty: 90 TABLET | Refills: 1 | Status: SHIPPED | OUTPATIENT
Start: 2021-03-22 | End: 2021-09-08

## 2021-03-22 NOTE — TELEPHONE ENCOUNTER
Lexapro 10 mg  Last Written Prescription Date: 10/13/20  Last Fill Quantity: 90 # of Refills: 0  Last Office Visit: 10/13/20    Lexapro 20 mg  Last Written Prescription Date: 10/13/20  Last Fill Quantity: 90 # of Refills: 1  Last Office Visit: 10/13/20

## 2021-04-10 DIAGNOSIS — F90.0 ATTENTION DEFICIT HYPERACTIVITY DISORDER (ADHD), PREDOMINANTLY INATTENTIVE TYPE: ICD-10-CM

## 2021-04-10 RX ORDER — DEXTROAMPHETAMINE SACCHARATE, AMPHETAMINE ASPARTATE MONOHYDRATE, DEXTROAMPHETAMINE SULFATE AND AMPHETAMINE SULFATE 2.5; 2.5; 2.5; 2.5 MG/1; MG/1; MG/1; MG/1
10 CAPSULE, EXTENDED RELEASE ORAL DAILY
Qty: 30 CAPSULE | Refills: 0 | Status: SHIPPED | OUTPATIENT
Start: 2021-04-12 | End: 2021-05-18

## 2021-05-17 ENCOUNTER — MYC REFILL (OUTPATIENT)
Dept: FAMILY MEDICINE | Facility: OTHER | Age: 26
End: 2021-05-17

## 2021-05-17 DIAGNOSIS — F90.0 ATTENTION DEFICIT HYPERACTIVITY DISORDER (ADHD), PREDOMINANTLY INATTENTIVE TYPE: ICD-10-CM

## 2021-05-17 RX ORDER — DEXTROAMPHETAMINE SACCHARATE, AMPHETAMINE ASPARTATE MONOHYDRATE, DEXTROAMPHETAMINE SULFATE AND AMPHETAMINE SULFATE 2.5; 2.5; 2.5; 2.5 MG/1; MG/1; MG/1; MG/1
10 CAPSULE, EXTENDED RELEASE ORAL DAILY
Qty: 30 CAPSULE | Refills: 0 | Status: CANCELLED | OUTPATIENT
Start: 2021-05-17

## 2021-05-18 ENCOUNTER — VIRTUAL VISIT (OUTPATIENT)
Dept: FAMILY MEDICINE | Facility: OTHER | Age: 26
End: 2021-05-18
Attending: NURSE PRACTITIONER
Payer: COMMERCIAL

## 2021-05-18 DIAGNOSIS — F41.9 ANXIETY: ICD-10-CM

## 2021-05-18 DIAGNOSIS — F90.0 ATTENTION DEFICIT HYPERACTIVITY DISORDER (ADHD), PREDOMINANTLY INATTENTIVE TYPE: Primary | ICD-10-CM

## 2021-05-18 PROCEDURE — 99214 OFFICE O/P EST MOD 30 MIN: CPT | Mod: 95 | Performed by: NURSE PRACTITIONER

## 2021-05-18 RX ORDER — DEXTROAMPHETAMINE SACCHARATE, AMPHETAMINE ASPARTATE MONOHYDRATE, DEXTROAMPHETAMINE SULFATE AND AMPHETAMINE SULFATE 5; 5; 5; 5 MG/1; MG/1; MG/1; MG/1
20 CAPSULE, EXTENDED RELEASE ORAL DAILY
Qty: 30 CAPSULE | Refills: 0 | Status: SHIPPED | OUTPATIENT
Start: 2021-07-19 | End: 2021-08-18

## 2021-05-18 RX ORDER — DEXTROAMPHETAMINE SACCHARATE, AMPHETAMINE ASPARTATE MONOHYDRATE, DEXTROAMPHETAMINE SULFATE AND AMPHETAMINE SULFATE 5; 5; 5; 5 MG/1; MG/1; MG/1; MG/1
20 CAPSULE, EXTENDED RELEASE ORAL DAILY
Qty: 30 CAPSULE | Refills: 0 | Status: SHIPPED | OUTPATIENT
Start: 2021-06-18 | End: 2021-07-18

## 2021-05-18 RX ORDER — DEXTROAMPHETAMINE SACCHARATE, AMPHETAMINE ASPARTATE MONOHYDRATE, DEXTROAMPHETAMINE SULFATE AND AMPHETAMINE SULFATE 5; 5; 5; 5 MG/1; MG/1; MG/1; MG/1
20 CAPSULE, EXTENDED RELEASE ORAL DAILY
Qty: 30 CAPSULE | Refills: 0 | OUTPATIENT
Start: 2021-05-18

## 2021-05-18 RX ORDER — ESCITALOPRAM OXALATE 10 MG/1
TABLET ORAL
Qty: 90 TABLET | Refills: 1 | Status: SHIPPED | OUTPATIENT
Start: 2021-05-18 | End: 2021-10-05

## 2021-05-18 RX ORDER — DEXTROAMPHETAMINE SACCHARATE, AMPHETAMINE ASPARTATE MONOHYDRATE, DEXTROAMPHETAMINE SULFATE AND AMPHETAMINE SULFATE 2.5; 2.5; 2.5; 2.5 MG/1; MG/1; MG/1; MG/1
10 CAPSULE, EXTENDED RELEASE ORAL DAILY
Qty: 30 CAPSULE | Refills: 0 | Status: SHIPPED | OUTPATIENT
Start: 2021-06-18 | End: 2021-07-18

## 2021-05-18 RX ORDER — DEXTROAMPHETAMINE SACCHARATE, AMPHETAMINE ASPARTATE MONOHYDRATE, DEXTROAMPHETAMINE SULFATE AND AMPHETAMINE SULFATE 2.5; 2.5; 2.5; 2.5 MG/1; MG/1; MG/1; MG/1
10 CAPSULE, EXTENDED RELEASE ORAL DAILY
Qty: 30 CAPSULE | Refills: 0 | Status: SHIPPED | OUTPATIENT
Start: 2021-05-18 | End: 2021-06-17

## 2021-05-18 RX ORDER — DEXTROAMPHETAMINE SACCHARATE, AMPHETAMINE ASPARTATE MONOHYDRATE, DEXTROAMPHETAMINE SULFATE AND AMPHETAMINE SULFATE 2.5; 2.5; 2.5; 2.5 MG/1; MG/1; MG/1; MG/1
10 CAPSULE, EXTENDED RELEASE ORAL DAILY
Qty: 30 CAPSULE | Refills: 0 | Status: SHIPPED | OUTPATIENT
Start: 2021-07-19 | End: 2021-08-18

## 2021-05-18 RX ORDER — DEXTROAMPHETAMINE SACCHARATE, AMPHETAMINE ASPARTATE MONOHYDRATE, DEXTROAMPHETAMINE SULFATE AND AMPHETAMINE SULFATE 5; 5; 5; 5 MG/1; MG/1; MG/1; MG/1
20 CAPSULE, EXTENDED RELEASE ORAL DAILY
Qty: 30 CAPSULE | Refills: 0 | Status: SHIPPED | OUTPATIENT
Start: 2021-05-18 | End: 2021-06-17

## 2021-05-18 NOTE — PATIENT INSTRUCTIONS
1. Anxiety  - escitalopram (LEXAPRO) 10 MG tablet; TAKE 1 TABLET BY MOUTH EVERY DAY ALONG WITH 20MG TABLET  Dispense: 90 tablet; Refill: 1    2. Attention deficit hyperactivity disorder (ADHD), predominantly inattentive type  - amphetamine-dextroamphetamine (ADDERALL XR) 10 MG 24 hr capsule; Take 1 capsule (10 mg) by mouth daily  Dispense: 30 capsule; Refill: 0  - amphetamine-dextroamphetamine (ADDERALL XR) 10 MG 24 hr capsule; Take 1 capsule (10 mg) by mouth daily  Dispense: 30 capsule; Refill: 0  - amphetamine-dextroamphetamine (ADDERALL XR) 10 MG 24 hr capsule; Take 1 capsule (10 mg) by mouth daily  Dispense: 30 capsule; Refill: 0  - amphetamine-dextroamphetamine (ADDERALL XR) 20 MG 24 hr capsule; Take 1 capsule (20 mg) by mouth daily  Dispense: 30 capsule; Refill: 0  - amphetamine-dextroamphetamine (ADDERALL XR) 20 MG 24 hr capsule; Take 1 capsule (20 mg) by mouth daily  Dispense: 30 capsule; Refill: 0  - amphetamine-dextroamphetamine (ADDERALL XR) 20 MG 24 hr capsule; Take 1 capsule (20 mg) by mouth daily  Dispense: 30 capsule; Refill: 0        Return in about 6 months (around 11/18/2021), or Pls set up 6 month FU - in person - ADD, anxiety.      Rosa Hanson CNP  [facility]

## 2021-05-18 NOTE — PROGRESS NOTES
Dorina is a 26 year old who is being evaluated via a billable video visit.      How would you like to obtain your AVS? MyChart  If the video visit is dropped, the invitation should be resent by: Text to cell phone: [phone]  Will anyone else be joining your video visit? No    Video Start Time: 4:18 PM        1. Anxiety  - escitalopram (LEXAPRO) 10 MG tablet; TAKE 1 TABLET BY MOUTH EVERY DAY ALONG WITH 20MG TABLET  Dispense: 90 tablet; Refill: 1    2. Attention deficit hyperactivity disorder (ADHD), predominantly inattentive type  - amphetamine-dextroamphetamine (ADDERALL XR) 10 MG 24 hr capsule; Take 1 capsule (10 mg) by mouth daily  Dispense: 30 capsule; Refill: 0  - amphetamine-dextroamphetamine (ADDERALL XR) 10 MG 24 hr capsule; Take 1 capsule (10 mg) by mouth daily  Dispense: 30 capsule; Refill: 0  - amphetamine-dextroamphetamine (ADDERALL XR) 10 MG 24 hr capsule; Take 1 capsule (10 mg) by mouth daily  Dispense: 30 capsule; Refill: 0  - amphetamine-dextroamphetamine (ADDERALL XR) 20 MG 24 hr capsule; Take 1 capsule (20 mg) by mouth daily  Dispense: 30 capsule; Refill: 0  - amphetamine-dextroamphetamine (ADDERALL XR) 20 MG 24 hr capsule; Take 1 capsule (20 mg) by mouth daily  Dispense: 30 capsule; Refill: 0  - amphetamine-dextroamphetamine (ADDERALL XR) 20 MG 24 hr capsule; Take 1 capsule (20 mg) by mouth daily  Dispense: 30 capsule; Refill: 0        Return in about 6 months (around 11/18/2021), or [facility] set up 6 month FU - in person - ADD, anxiety.      Rosa Hanson, CNP  [facility] - [facility] is a 26 year old who presents for the following health issues         ADHD Follow up  Symptoms have been present for: Longstanding, over 1 year  Aggravating factors: None  Relieving factors:  Medication as prescribed  Recent stressors:  None  Drug or alcohol use:  none  Past medications:  See health history  Therapy: n/a  Psychiatric history: anxiety  Focus at home or school:  good, wears off around  the end of the day  Ability to sequence tasks:  yes  Weight Stable  yes  Suicidal Ideation or plan:  none  Evident abuse or misuse of medication: none  Request for early refills: no        Anxiety Follow-Up    How are you doing with your anxiety since your last visit? No change    Are you having other symptoms that might be associated with anxiety? No    Have you had a significant life event? No     Are you feeling depressed? No    Do you have any concerns with your use of alcohol or other drugs? No        Social History     Tobacco Use     Smoking status: Former Smoker     Years: 5.00     Types: Cigarettes     Smokeless tobacco: Never Used     Tobacco comment: tried to quit: no   Substance Use Topics     Alcohol use: Yes     Comment: occasionally     Drug use: No           BETSY-7 SCORE 3/20/2020 10/13/2020 5/18/2021   Total Score - - 4 (minimal anxiety)   Total Score 0 1 4         PHQ 3/20/2020 10/13/2020 5/18/2021   PHQ-9 Total Score 3 3 2   Q9: Thoughts of better off dead/self-harm past 2 weeks Not at all Not at all Not at all         Review of Systems   Constitutional, HEENT, cardiovascular, pulmonary, gi and gu systems are negative, except as otherwise noted.          Objective       Vitals:  No vitals were obtained today due to virtual visit.  Physical Exam   GENERAL: Healthy, alert and no distress  EYES: Eyes grossly normal to inspection.  No discharge or erythema, or obvious scleral/conjunctival abnormalities.  RESP: No audible wheeze, cough, or visible cyanosis.  No visible retractions or increased work of breathing.    SKIN: Visible skin clear. No significant rash, abnormal pigmentation or lesions.  NEURO: Cranial nerves grossly intact.  Mentation and speech appropriate for age.  PSYCH: Mentation appears normal, affect normal/bright, judgement and insight intact, normal speech and appearance well-groomed.        Video-Visit Details    Type of service:  Video Visit    Video End Time:4:27 PM    Originating  Location (pt. Location): Home    Distant Location (provider location):  [facility]     Platform used for Video Visit: Polly

## 2021-05-18 NOTE — TELEPHONE ENCOUNTER
Not filled - LOV over 6 months ago, needs appt.  Virtual, or in person.    oRsa Hanson [facility]  [phone]

## 2021-05-18 NOTE — TELEPHONE ENCOUNTER
adderall  Last Written Prescription Date: 4/12/21  Last Fill Quantity: 30 # of Refills: 0  Last Office Visit: 10/13/2020

## 2021-05-19 NOTE — PROGRESS NOTES
Called patient, left voicemail to call to schedule 6 month follow up in person for ADD and Anxiety.

## 2021-08-12 DIAGNOSIS — F90.0 ATTENTION DEFICIT HYPERACTIVITY DISORDER (ADHD), PREDOMINANTLY INATTENTIVE TYPE: ICD-10-CM

## 2021-08-12 RX ORDER — DEXTROAMPHETAMINE SACCHARATE, AMPHETAMINE ASPARTATE MONOHYDRATE, DEXTROAMPHETAMINE SULFATE AND AMPHETAMINE SULFATE 2.5; 2.5; 2.5; 2.5 MG/1; MG/1; MG/1; MG/1
10 CAPSULE, EXTENDED RELEASE ORAL DAILY
Qty: 30 CAPSULE | Refills: 0 | Status: SHIPPED | OUTPATIENT
Start: 2021-10-13 | End: 2021-11-12

## 2021-08-12 RX ORDER — DEXTROAMPHETAMINE SACCHARATE, AMPHETAMINE ASPARTATE MONOHYDRATE, DEXTROAMPHETAMINE SULFATE AND AMPHETAMINE SULFATE 5; 5; 5; 5 MG/1; MG/1; MG/1; MG/1
20 CAPSULE, EXTENDED RELEASE ORAL DAILY
Qty: 30 CAPSULE | Refills: 0 | Status: SHIPPED | OUTPATIENT
Start: 2021-10-13 | End: 2021-11-12

## 2021-08-12 RX ORDER — DEXTROAMPHETAMINE SACCHARATE, AMPHETAMINE ASPARTATE MONOHYDRATE, DEXTROAMPHETAMINE SULFATE AND AMPHETAMINE SULFATE 5; 5; 5; 5 MG/1; MG/1; MG/1; MG/1
20 CAPSULE, EXTENDED RELEASE ORAL DAILY
Qty: 30 CAPSULE | Refills: 0 | Status: SHIPPED | OUTPATIENT
Start: 2021-09-12 | End: 2021-10-12

## 2021-08-12 RX ORDER — DEXTROAMPHETAMINE SACCHARATE, AMPHETAMINE ASPARTATE MONOHYDRATE, DEXTROAMPHETAMINE SULFATE AND AMPHETAMINE SULFATE 5; 5; 5; 5 MG/1; MG/1; MG/1; MG/1
20 CAPSULE, EXTENDED RELEASE ORAL DAILY
Qty: 30 CAPSULE | Refills: 0 | Status: SHIPPED | OUTPATIENT
Start: 2021-08-12 | End: 2021-09-11

## 2021-08-12 RX ORDER — DEXTROAMPHETAMINE SACCHARATE, AMPHETAMINE ASPARTATE MONOHYDRATE, DEXTROAMPHETAMINE SULFATE AND AMPHETAMINE SULFATE 2.5; 2.5; 2.5; 2.5 MG/1; MG/1; MG/1; MG/1
10 CAPSULE, EXTENDED RELEASE ORAL DAILY
Qty: 30 CAPSULE | Refills: 0 | Status: SHIPPED | OUTPATIENT
Start: 2021-08-12 | End: 2021-09-11

## 2021-08-12 RX ORDER — DEXTROAMPHETAMINE SACCHARATE, AMPHETAMINE ASPARTATE MONOHYDRATE, DEXTROAMPHETAMINE SULFATE AND AMPHETAMINE SULFATE 2.5; 2.5; 2.5; 2.5 MG/1; MG/1; MG/1; MG/1
10 CAPSULE, EXTENDED RELEASE ORAL DAILY
Qty: 30 CAPSULE | Refills: 0 | Status: SHIPPED | OUTPATIENT
Start: 2021-09-12 | End: 2021-10-12

## 2021-08-12 NOTE — TELEPHONE ENCOUNTER
Patient called for refill    amphetamine-dextroamphetamine (ADDERALL XR) 10 MG 24 hr capsule        Last Written Prescription Date:  7/19/21-8/18/21  Last Fill Quantity: 30,   # refills: 0  Last Office Visit: 5/18/21  Future Office visit:       Routing refill request to provider for review/approval because:  Drug not on the G, P or  Health refill protocol or controlled substance        amphetamine-dextroamphetamine (ADDERALL XR) 20 MG 24 hr capsule      Last Written Prescription Date:  7/19/21-8/18/21  Last Fill Quantity: 30,   # refills: 0  Last Office Visit: 5/18/21  Future Office visit:       Routing refill request to provider for review/approval because:  Drug not on the G, P or  [facility] refill protocol or controlled substance

## 2021-09-07 DIAGNOSIS — F41.9 ANXIETY: ICD-10-CM

## 2021-09-08 RX ORDER — ESCITALOPRAM OXALATE 20 MG/1
TABLET ORAL
Qty: 90 TABLET | Refills: 1 | Status: SHIPPED | OUTPATIENT
Start: 2021-09-08 | End: 2021-12-15

## 2021-10-05 DIAGNOSIS — F41.9 ANXIETY: ICD-10-CM

## 2021-10-05 RX ORDER — ESCITALOPRAM OXALATE 10 MG/1
TABLET ORAL
Qty: 90 TABLET | Refills: 1 | Status: SHIPPED | OUTPATIENT
Start: 2021-10-05 | End: 2022-06-17

## 2021-12-06 ENCOUNTER — APPOINTMENT (OUTPATIENT)
Dept: URGENT CARE | Facility: CLINIC | Age: 26
End: 2021-12-06

## 2021-12-06 RX ORDER — DEXTROAMPHETAMINE SACCHARATE, AMPHETAMINE ASPARTATE MONOHYDRATE, DEXTROAMPHETAMINE SULFATE AND AMPHETAMINE SULFATE 2.5; 2.5; 2.5; 2.5 MG/1; MG/1; MG/1; MG/1
10 CAPSULE, EXTENDED RELEASE ORAL DAILY
Refills: 0 | Status: CANCELLED | OUTPATIENT
Start: 2021-12-06

## 2021-12-06 RX ORDER — DEXTROAMPHETAMINE SACCHARATE, AMPHETAMINE ASPARTATE MONOHYDRATE, DEXTROAMPHETAMINE SULFATE AND AMPHETAMINE SULFATE 5; 5; 5; 5 MG/1; MG/1; MG/1; MG/1
20 CAPSULE, EXTENDED RELEASE ORAL DAILY
Refills: 0 | Status: CANCELLED | OUTPATIENT
Start: 2021-12-06

## 2021-12-06 NOTE — TELEPHONE ENCOUNTER
Patient called back and is scheduled on 12/15 with PCP. Patient is currently on quarantine and is done on 12/14.     Next 5 appointments (look out 90 days)    Dec 15, 2021  3:30 PM  (Arrive by 3:15 PM)  SHORT with Rosa Hanson CNP  [facility] ([facility] ) [address]  [facility] [address]  [phone]

## 2021-12-06 NOTE — TELEPHONE ENCOUNTER
These meds are not on the patient's current med list.  She called because they are not on her med list in her my chart or at the pharmacy.  Please advise.    Called patient and left VM to call back and schedule an appointment for a 6 month f/u per providers notes on 5/18/21    Return in about 6 months (around 11/18/2021), or [facility] set up 6 month FU - in person - ADD, anxiety.       amphetamine-dextroamphetamine (ADDERALL XR) 20 MG 24 hr capsule      Last Written Prescription Date:  10/13/21-11/12/20  Last Fill Quantity: 30,   # refills: 0  Last Office Visit: 5/18/21  virtal  Future Office visit:       Routing refill request to provider for review/approval because:  Drug not on the G, [facility] or [facility] refill protocol or controlled substance      amphetamine-dextroamphetamine (ADDERALL XR) 10 MG 24 hr capsule      Last Written Prescription Date:  10/13/21-11-12/21  Last Fill Quantity: 30,   # refills: 0  Last Office Visit: 5/18/21  Future Office visit:       Routing refill request to provider for review/approval because:  Drug not on the [facility], [facility] or [facility] refill protocol or controlled substance

## 2021-12-08 RX ORDER — DEXTROAMPHETAMINE SACCHARATE, AMPHETAMINE ASPARTATE MONOHYDRATE, DEXTROAMPHETAMINE SULFATE AND AMPHETAMINE SULFATE 2.5; 2.5; 2.5; 2.5 MG/1; MG/1; MG/1; MG/1
10 CAPSULE, EXTENDED RELEASE ORAL DAILY
Refills: 0 | Status: CANCELLED | OUTPATIENT
Start: 2021-12-08

## 2021-12-08 RX ORDER — DEXTROAMPHETAMINE SACCHARATE, AMPHETAMINE ASPARTATE MONOHYDRATE, DEXTROAMPHETAMINE SULFATE AND AMPHETAMINE SULFATE 5; 5; 5; 5 MG/1; MG/1; MG/1; MG/1
20 CAPSULE, EXTENDED RELEASE ORAL DAILY
Refills: 0 | Status: CANCELLED | OUTPATIENT
Start: 2021-12-08

## 2021-12-11 ENCOUNTER — TELEPHONE (OUTPATIENT)
Dept: NURSING | Facility: CLINIC | Age: 26
End: 2021-12-11

## 2021-12-11 NOTE — TELEPHONE ENCOUNTER
Patient calls requesting a refill of Adderall until her appointment on 12/15. She is completely out of medication. Please advise.    Meghann Rose RN  [facility]

## 2021-12-13 DIAGNOSIS — F90.0 ATTENTION DEFICIT HYPERACTIVITY DISORDER (ADHD), PREDOMINANTLY INATTENTIVE TYPE: Primary | ICD-10-CM

## 2021-12-13 RX ORDER — DEXTROAMPHETAMINE SACCHARATE, AMPHETAMINE ASPARTATE MONOHYDRATE, DEXTROAMPHETAMINE SULFATE AND AMPHETAMINE SULFATE 2.5; 2.5; 2.5; 2.5 MG/1; MG/1; MG/1; MG/1
10 CAPSULE, EXTENDED RELEASE ORAL DAILY
Qty: 30 CAPSULE | Refills: 0 | Status: SHIPPED | OUTPATIENT
Start: 2021-12-13 | End: 2021-12-15

## 2021-12-13 RX ORDER — DEXTROAMPHETAMINE SACCHARATE, AMPHETAMINE ASPARTATE MONOHYDRATE, DEXTROAMPHETAMINE SULFATE AND AMPHETAMINE SULFATE 5; 5; 5; 5 MG/1; MG/1; MG/1; MG/1
20 CAPSULE, EXTENDED RELEASE ORAL DAILY
Qty: 30 CAPSULE | Refills: 0 | Status: SHIPPED | OUTPATIENT
Start: 2021-12-13 | End: 2022-01-11

## 2021-12-13 NOTE — TELEPHONE ENCOUNTER
Patient has an appointment scheduled on 12/15 for a f/u med review.  Requesting the meds get filled today by a covering provider if possible.    amphetamine-dextroamphetamine (ADDERALL XR) 20 MG 24 hr capsule      Last Written Prescription Date:  10/13/21-11/12/20  Last Fill Quantity: 30,   # refills: 0  Last Office Visit: 5/18/21 virtual  Future Office visit:    Next 5 appointments (look out 90 days)    Dec 15, 2021  3:30 PM  (Arrive by 3:15 PM)  SHORT with Rosa Hanson CNP  [facility] ([facility] ) [address]  [place] [address]  [phone]           Routing refill request to provider for review/approval because:  Drug not on the [facility], [facility] or [facility] refill protocol or controlled substance      amphetamine-dextroamphetamine (ADDERALL XR) 10 MG 24 hr capsule      Last Written Prescription Date:  10/13/21-11/12/21  Last Fill Quantity: 30,   # refills: 0  Last Office Visit: 5/18/21  Future Office visit:    Next 5 appointments (look out 90 days)    Dec 15, 2021  3:30 PM  (Arrive by 3:15 PM)  SHORT with Rosa Hanson CNP  [facility] ([facility] ) [address]  [place] [address]  [phone]           Routing refill request to provider for review/approval because:  Drug not on the [facility], [facility] or [facility] refill protocol or controlled substance

## 2021-12-14 NOTE — PROGRESS NOTES
Assessment & Plan        Attention deficit hyperactivity disorder (ADHD), predominantly inattentive type  - Continue Adderall XR 20 mg yung  - amphetamine-dextroamphetamine (ADDERALL XR) 10 MG 24 hr capsule; Take 1 capsule (10 mg) by mouth daily    Anxiety  - Continue Lexapro 10 mg daily  - escitalopram (LEXAPRO) 20 MG tablet; Take 1 tablet (20 mg) by mouth daily        Return in about 6 months (around 6/15/2022).      Rosa Hanson, ERROL  [facility] - [facility] is a 26 year old who presents for the following health issues       ADHD Follow up  Symptoms have been present for: Longstanding, over 1 year  Aggravating factors: none  Relieving factors:  Medication as prescribed  Recent stressors:  recently bought a house   Drug or alcohol use:  none  Past medications:  See health history  Therapy: none   Psychiatric history: depression/anxiety  Focus at home or school:  stable  Ability to sequence tasks:  stable  Weight Stable   Suicidal Ideation or plan:  none  Evident abuse or misuse of medication: none  Request for early refills: no         Depression and Anxiety Follow-Up    How are you doing with your depression since your last visit? Worsened     How are you doing with your anxiety since your last visit?  Worsened     Are you having other symptoms that might be associated with depression or anxiety? Yes:  worrying more    Have you had a significant life event? No     Do you have any concerns with your use of alcohol or other drugs? No        Social History     Tobacco Use     Smoking status: Former Smoker     Years: 5.00     Types: Cigarettes     Smokeless tobacco: Never Used     Tobacco comment: tried to quit: no   Substance Use Topics     Alcohol use: Yes     Comment: occasionally     Drug use: No         PHQ 10/13/2020 5/18/2021 12/15/2021   PHQ-9 Total Score 3 2 5   Q9: Thoughts of better off dead/self-harm past 2 weeks Not at all Not at all Not at all       BETSY-7 SCORE 10/13/2020  2021 12/15/2021   Total Score - 4 (minimal anxiety) -   Total Score 1 4 7           Patient Active Problem List   Diagnosis     ADHD (attention deficit hyperactivity disorder)     Anxiety     Past Surgical History:   Procedure Laterality Date      SECTION N/A 10/15/2017    Procedure:  SECTION;   Section;  Surgeon: Ramez Marquez MD;  Location: [facility]     ORTHOPEDIC SURGERY  2018    rt wrist , cyst removed     surgically repaired      blocked tear duct - left     TONSILLECTOMY      tonsillitis       Social History     Tobacco Use     Smoking status: Former Smoker     Years: 5.00     Types: Cigarettes     Smokeless tobacco: Never Used     Tobacco comment: tried to quit: no   Substance Use Topics     Alcohol use: Yes     Comment: occasionally     Family History   Problem Relation Age of Onset     Diabetes Maternal Grandfather      Heart Disease Maternal Grandfather         heart disease     Myocardial Infarction Paternal Aunt      Myocardial Infarction Paternal Grandfather         cause of death     Coronary Artery Disease Paternal Grandfather      Myocardial Infarction Paternal Uncle      Coronary Artery Disease Paternal Uncle      Coronary Artery Disease Paternal Aunt            Current Outpatient Medications   Medication Sig Dispense Refill     amphetamine-dextroamphetamine (ADDERALL XR) 10 MG 24 hr capsule Take 1 capsule (10 mg) by mouth daily 30 capsule 0     amphetamine-dextroamphetamine (ADDERALL XR) 20 MG 24 hr capsule Take 1 capsule (20 mg) by mouth daily 30 capsule 0     escitalopram (LEXAPRO) 10 MG tablet TAKE 1 TABLET BY MOUTH EVERY DAY TAKE ALONG WITH 20 MG TABLET 90 tablet 1     escitalopram (LEXAPRO) 20 MG tablet Take 1 tablet (20 mg) by mouth daily 90 tablet 1     levonorgestrel (MIRENA) 20 MCG/24HR IUD 1 each (20 mcg) by Intrauterine route continuous           Allergies   Allergen Reactions     Bacitracin      Neosporin-Pt. Had eye swelling as a baby, Pt. States not even  sure if she still has an allergy to neosporin     Bacitracin Zinc      Neosporin     Gramicidin D      Neosporin     Neomycin Sulfate      Neosporin     Polymyxin B      Neosporin     Polymyxin B Sulfate      Neosporin         Recent Labs   Lab Test 08/10/18  1018 01/17/18  1507 10/16/17  0523 10/15/17  1102 10/14/17  1841 09/20/17  1050   A1C  --   --   --   --   --  5.6   ALT  --   --   --   --  11  --    CR 0.79  --  0.85   < > 1.11*  --    GFRESTIMATED 88  --  82   < > 60*  --    GFRESTBLACK >90  --  >90   < > 73  --    POTASSIUM 4.0  --  4.1   < > 4.5  --    TSH  --  1.03  --   --   --   --     < > = values in this interval not displayed.        BP Readings from Last 3 Encounters:   12/15/21 118/68   12/03/20 104/60   10/13/20 126/62    Wt Readings from Last 3 Encounters:   12/15/21 62.6 kg (138 lb 1.6 oz)   10/13/20 64.9 kg (143 lb)   10/16/19 61.7 kg (136 lb)            Review of Systems   Constitutional, HEENT, cardiovascular, pulmonary, GI, , musculoskeletal, neuro, skin, endocrine and psych systems are negative, except as otherwise noted.          Objective    /68   Pulse 85   Temp 98.6  F (37  C) (Tympanic)   Ht 1.524 m (5')   Wt 62.6 kg (138 lb 1.6 oz)   SpO2 98%   BMI 26.97 kg/m    Body mass index is 26.97 kg/m .         Physical Exam   GENERAL: healthy, alert and no distress  EYES: Eyes grossly normal to inspection, PERRL and conjunctivae and sclerae normal  HENT: ear canals and TM's normal, nose and mouth without ulcers or lesions  NECK: no adenopathy, no asymmetry, masses, or scars and thyroid normal to palpation  RESP: lungs clear to auscultation - no rales, rhonchi or wheezes  CV: regular rate and rhythm, normal S1 S2, no S3 or S4, no murmur, click or rub, no peripheral edema and peripheral pulses strong  SKIN: no suspicious lesions or rashes  PSYCH: mentation appears normal, affect normal/bright

## 2021-12-15 ENCOUNTER — OFFICE VISIT (OUTPATIENT)
Dept: FAMILY MEDICINE | Facility: OTHER | Age: 26
End: 2021-12-15
Attending: NURSE PRACTITIONER
Payer: COMMERCIAL

## 2021-12-15 VITALS
BODY MASS INDEX: 27.11 KG/M2 | SYSTOLIC BLOOD PRESSURE: 118 MMHG | HEIGHT: 60 IN | TEMPERATURE: 98.6 F | WEIGHT: 138.1 LBS | DIASTOLIC BLOOD PRESSURE: 68 MMHG | OXYGEN SATURATION: 98 % | HEART RATE: 85 BPM

## 2021-12-15 DIAGNOSIS — F41.9 ANXIETY: ICD-10-CM

## 2021-12-15 DIAGNOSIS — F90.0 ATTENTION DEFICIT HYPERACTIVITY DISORDER (ADHD), PREDOMINANTLY INATTENTIVE TYPE: Primary | ICD-10-CM

## 2021-12-15 PROCEDURE — G0463 HOSPITAL OUTPT CLINIC VISIT: HCPCS

## 2021-12-15 PROCEDURE — 99214 OFFICE O/P EST MOD 30 MIN: CPT | Performed by: NURSE PRACTITIONER

## 2021-12-15 RX ORDER — DEXTROAMPHETAMINE SACCHARATE, AMPHETAMINE ASPARTATE MONOHYDRATE, DEXTROAMPHETAMINE SULFATE AND AMPHETAMINE SULFATE 2.5; 2.5; 2.5; 2.5 MG/1; MG/1; MG/1; MG/1
10 CAPSULE, EXTENDED RELEASE ORAL DAILY
Qty: 30 CAPSULE | Refills: 0 | Status: SHIPPED | OUTPATIENT
Start: 2021-12-15 | End: 2022-02-11

## 2021-12-15 RX ORDER — ESCITALOPRAM OXALATE 20 MG/1
20 TABLET ORAL DAILY
Qty: 90 TABLET | Refills: 1 | Status: SHIPPED | OUTPATIENT
Start: 2021-12-15 | End: 2022-04-07

## 2021-12-15 ASSESSMENT — MIFFLIN-ST. JEOR: SCORE: 1277.92

## 2021-12-15 ASSESSMENT — ANXIETY QUESTIONNAIRES
7. FEELING AFRAID AS IF SOMETHING AWFUL MIGHT HAPPEN: NOT AT ALL
3. WORRYING TOO MUCH ABOUT DIFFERENT THINGS: MORE THAN HALF THE DAYS
5. BEING SO RESTLESS THAT IT IS HARD TO SIT STILL: SEVERAL DAYS
2. NOT BEING ABLE TO STOP OR CONTROL WORRYING: MORE THAN HALF THE DAYS
1. FEELING NERVOUS, ANXIOUS, OR ON EDGE: SEVERAL DAYS
GAD7 TOTAL SCORE: 7
IF YOU CHECKED OFF ANY PROBLEMS ON THIS QUESTIONNAIRE, HOW DIFFICULT HAVE THESE PROBLEMS MADE IT FOR YOU TO DO YOUR WORK, TAKE CARE OF THINGS AT HOME, OR GET ALONG WITH OTHER PEOPLE: SOMEWHAT DIFFICULT
6. BECOMING EASILY ANNOYED OR IRRITABLE: NOT AT ALL

## 2021-12-15 ASSESSMENT — PATIENT HEALTH QUESTIONNAIRE - PHQ9
SUM OF ALL RESPONSES TO PHQ QUESTIONS 1-9: 5
5. POOR APPETITE OR OVEREATING: SEVERAL DAYS

## 2021-12-15 ASSESSMENT — PAIN SCALES - GENERAL: PAINLEVEL: NO PAIN (0)

## 2021-12-15 NOTE — LETTER
[facility]  12/15/21  Patient: Valeria Thibodeaux  YOB: 1995  Medical Record Number: [id number]                                                                                  Non-Opioid Controlled Substance Agreement    This is an agreement between you and your provider regarding safe and appropriate use of controlled substances prescribed by your care team. Controlled substances are?medicines that can cause physical and mental dependence (abuse).     There are strict laws about having and using these medicines. We here at [facility] are  committed to working with you in your efforts to get better. To support you in this work, we'll help you schedule regular office appointments for medicine refills. If we must cancel or change your appointment for any reason, we'll make sure you have enough medicine to last until your next appointment.     As a Provider, I will:     Listen carefully to your concerns while treating you with respect.     Recommend a treatment plan that I believe is in your best interest and may involve therapies other than medicine.      Talk with you often about the possible benefits and the risk of harm of any medicine that we prescribe for you.    Assess the safety of this medicine and check how well it works.      Provide a plan on how to taper (discontinue or go off) using this medicine if the decision is made to stop its use.      ::  As a Patient, I understand controlled substances:       Are prescribed by my care provider to help me function or work and manage my condition(s).?    Are strong medicines and can cause serious side effects.       Need to be taken exactly as prescribed.?Combining controlled substances with certain medicines or chemicals (such as illegal drugs, alcohol, sedatives, sleeping pills, and benzodiazepines) can be dangerous or even fatal.? If I stop taking my medicines suddenly, I may have severe withdrawal symptoms.     The risks, benefits, and  side effects of these medicine(s) were explained to me. I agree that:    1. I will take part in other treatments as advised by my care team. This may be psychiatry or counseling, physical therapy, behavioral therapy, group treatment or a referral to specialist.    2. I will keep all my appointments and understand this is part of the monitoring of controlled substances.?My care team may require an office visit for EVERY controlled substance refill. If I miss appointments or don t follow instructions, my care team may stop my medicine    3. I will take my medicines as prescribed. I will not change the dose or schedule unless my care team tells me to. There will be no refills if I run out early.      4. I may be asked to come to the clinic and complete a urine drug test or complete a pill count. If I don t give a urine sample or participate in a pill count, the care team may stop my medicine.    5. I will only receive controlled substance prescriptions from this clinic. If I am treated by another provider, I will tell them that I am taking controlled substances and that I have a treatment agreement with this provider. I will inform my [facility] care team within one business day if I am given a prescription for any controlled substance by another healthcare provider. My [facility] care team can contact other providers and pharmacists about my use of any medicines.    6. It is up to me to make sure that I don't run out of my medicines on weekends or holidays.?If my care team is willing to refill my prescription without a visit, I must request refills only during office hours. Refills may take up to 3 business days to process. I will use one pharmacy to fill all my controlled substance prescriptions. I will notify the clinic about any changes to my insurance or medicine availability.    7. I am responsible for my prescriptions. If the medicine/prescription is lost, stolen or destroyed, it will not be  replaced.?I also agree not to share controlled substance medicines with anyone.     8. I am aware I should not use any illegal or recreational drugs. I agree not to drink alcohol unless my care team says I can.     9. If I enroll in the [place] Medical Cannabis program, I will tell my care team before my next refill.    10. I will tell my care team right away if I become pregnant, have a new medical problem treated outside of my regular clinic, or have a change in my medicines.     11. I understand that this medicine can affect my thinking, judgment and reaction time.? Alcohol and drugs affect the brain and body, which can affect the safety of my driving. Being under the influence of alcohol or drugs can affect my decision-making, behaviors, personal safety and the safety of others. Driving while impaired (DWI) can occur if a person is driving, operating or in physical control of a car, motorcycle, boat, snowmobile, ATV, motorbike, off-road vehicle or any other motor vehicle ([place] Statute 169A.20). I understand the risk if I choose to drive or operate any vehicle or machinery.    I understand that if I do not follow any of the conditions above, my prescriptions or treatment may be stopped or changed.   I agree that my provider, clinic care team and pharmacy may work with any city, state or federal law enforcement agency that investigates the misuse, sale or other diversion of my controlled medicine. I will allow my provider to discuss my care with, or share a copy of, this agreement with any other treating provider, pharmacy or emergency room where I receive care.     I have read this agreement and have asked questions about anything I did not understand.    ________________________________________________________  Patient Signature - Valeria Thibodeaux     ___________________                   Date     ________________________________________________________  Provider Signature - Rosa Hanson, CNP        ___________________                   Date     ________________________________________________________  Witness Signature (required if provider not present while patient signing)          ___________________                   Date

## 2021-12-15 NOTE — NURSING NOTE
Chief Complaint   Patient presents with     A.D.H.D     Depression     Anxiety       Initial /68   Pulse 85   Temp 98.6  F (37  C) (Tympanic)   Ht 1.524 m (5')   Wt 62.6 kg (138 lb 1.6 oz)   SpO2 98%   BMI 26.97 kg/m   Estimated body mass index is 26.97 kg/m  as calculated from the following:    Height as of this encounter: 1.524 m (5').    Weight as of this encounter: 62.6 kg (138 lb 1.6 oz).  Medication Reconciliation: complete  Glendy Finney LPN

## 2021-12-15 NOTE — PATIENT INSTRUCTIONS
Assessment & Plan        Attention deficit hyperactivity disorder (ADHD), predominantly inattentive type  - Continue Adderall XR 20 mg yung  - amphetamine-dextroamphetamine (ADDERALL XR) 10 MG 24 hr capsule; Take 1 capsule (10 mg) by mouth daily    Anxiety  - Continue Lexapro 10 mg daily  - escitalopram (LEXAPRO) 20 MG tablet; Take 1 tablet (20 mg) by mouth daily        Return in about 6 months (around 6/15/2022).      Rosa Hanson, ERROL  [facility]

## 2021-12-16 ASSESSMENT — ANXIETY QUESTIONNAIRES: GAD7 TOTAL SCORE: 7

## 2022-01-11 ENCOUNTER — MYC REFILL (OUTPATIENT)
Dept: FAMILY MEDICINE | Facility: OTHER | Age: 27
End: 2022-01-11
Payer: COMMERCIAL

## 2022-01-11 DIAGNOSIS — F90.0 ATTENTION DEFICIT HYPERACTIVITY DISORDER (ADHD), PREDOMINANTLY INATTENTIVE TYPE: ICD-10-CM

## 2022-01-12 RX ORDER — DEXTROAMPHETAMINE SACCHARATE, AMPHETAMINE ASPARTATE MONOHYDRATE, DEXTROAMPHETAMINE SULFATE AND AMPHETAMINE SULFATE 5; 5; 5; 5 MG/1; MG/1; MG/1; MG/1
20 CAPSULE, EXTENDED RELEASE ORAL DAILY
Qty: 30 CAPSULE | Refills: 0 | Status: SHIPPED | OUTPATIENT
Start: 2022-01-12 | End: 2022-02-10

## 2022-01-12 NOTE — TELEPHONE ENCOUNTER
amphetamine-dextroamphetamine (ADDERALL XR) 20 MG 24 hr capsule        Last Written Prescription Date:  12/13/21  Last Fill Quantity: 30,   # refills: 0  Last Office Visit: 12/15/21  Future Office visit:       Routing refill request to provider for review/approval because:  Drug not on the [facility], [facility] or [facility] refill protocol or controlled substance

## 2022-01-25 NOTE — PROGRESS NOTES
Valeria Thibodeaux is a 24 year old female who presents to clinic today for the following health issues:          Anxiety Follow-Up    How are you doing with your anxiety since your last visit? Improved     Are you having other symptoms that might be associated with anxiety? No    Have you had a significant life event? No     Are you feeling depressed? No    Do you have any concerns with your use of alcohol or other drugs? No      Social History     Tobacco Use     Smoking status: Former Smoker     Packs/day: 0.30     Years: 5.00     Pack years: 1.50     Types: Cigarettes     Smokeless tobacco: Never Used     Tobacco comment: tried to quit: no   Substance Use Topics     Alcohol use: Yes     Comment: occasionally     Drug use: No         PHQ-9 SCORE 2019 2019 10/16/2019   PHQ-9 Total Score - - -   PHQ-9 Total Score 2 2 2     BETSY-7 SCORE 2019 2019 10/16/2019   Total Score 0 2 1           How many servings of fruits and vegetables do you eat daily?  0-1    On average, how many sweetened beverages do you drink each day (soda, juice, sweet tea, etc)?   0    How many days per week do you miss taking your medication? 0        ADD Follow up  Symptoms have been present for: Longstanding, over 1 year  Aggravating factors: No  Relieving factors:  Medication as prescribed  Recent stressors:  No  Drug or alcohol use:  No  Past medications:  Se health history  Therapy: No  Psychiatric history: ADD  Focus at home or school:  Good  Ability to sequence tasks:  Good  Weight Stable  Yes  Suicidal Ideation or plan:  No  Evident abuse or misuse of medication: No  Request for early refills: No      Patient requests testing for STD due to possible exposure      Patient Active Problem List   Diagnosis     ADHD (attention deficit hyperactivity disorder)     Anxiety     Past Surgical History:   Procedure Laterality Date      SECTION N/A 10/15/2017    Procedure:  SECTION;   Section;  Surgeon:  Pt presents for routine non-covered foot care. Pt. does not have high risk feet. Pedal pulses are palpable. Nails are elongated, thickened Bilaterally. Diagnosis is onychauxis. Nails were reduced Bilaterally. Patient tolerated well and related relief. RTC p.r.n. as PROC B            Ramez Marquez MD;  Location: [facility]     ORTHOPEDIC SURGERY  08/2018    rt wrist , cyst removed     surgically repaired      blocked tear duct - left     TONSILLECTOMY      tonsillitis       Social History     Tobacco Use     Smoking status: Former Smoker     Packs/day: 0.30     Years: 5.00     Pack years: 1.50     Types: Cigarettes     Smokeless tobacco: Never Used     Tobacco comment: tried to quit: no   Substance Use Topics     Alcohol use: Yes     Comment: occasionally     Family History   Problem Relation Age of Onset     Diabetes Maternal Grandfather      Heart Disease Maternal Grandfather         heart disease     Myocardial Infarction Paternal Aunt      Myocardial Infarction Paternal Grandfather         cause of death     Coronary Artery Disease Paternal Grandfather      Myocardial Infarction Paternal Uncle      Coronary Artery Disease Paternal Uncle      Coronary Artery Disease Paternal Aunt          Current Outpatient Medications   Medication Sig Dispense Refill     [START ON 11/11/2019] amphetamine-dextroamphetamine (ADDERALL XR) 10 MG 24 hr capsule Take 1 capsule (10 mg) by mouth daily 30 capsule 0     [START ON 12/11/2019] amphetamine-dextroamphetamine (ADDERALL XR) 10 MG 24 hr capsule Take 1 capsule (10 mg) by mouth daily 30 capsule 0     [START ON 1/11/2020] amphetamine-dextroamphetamine (ADDERALL XR) 10 MG 24 hr capsule Take 1 capsule (10 mg) by mouth daily 30 capsule 0     [START ON 11/11/2019] amphetamine-dextroamphetamine (ADDERALL XR) 20 MG 24 hr capsule Take 1 capsule (20 mg) by mouth daily 30 capsule 0     [START ON 12/11/2019] amphetamine-dextroamphetamine (ADDERALL XR) 20 MG 24 hr capsule Take 1 capsule (20 mg) by mouth daily 30 capsule 0     [START ON 1/11/2020] amphetamine-dextroamphetamine (ADDERALL XR) 20 MG 24 hr capsule Take 1 capsule (20 mg) by mouth daily 30 capsule 0     amphetamine-dextroamphetamine (ADDERALL XR) 20 MG 24 hr capsule Take 1 capsule (20 mg) by mouth daily 30 capsule 0      escitalopram (LEXAPRO) 20 MG tablet 1.5 tabs po every day for 30 mg daily 135 tablet 1     levonorgestrel (MIRENA) 20 MCG/24HR IUD 1 each (20 mcg) by Intrauterine route continuous       amphetamine-dextroamphetamine (ADDERALL XR) 10 MG 24 hr capsule Take 1 capsule (10 mg) by mouth daily 30 capsule 0     Allergies   Allergen Reactions     Bacitracin      Neosporin-Pt. Had eye swelling as a baby, Pt. States not even sure if she still has an allergy to neosporin     Bacitracin Zinc      Neosporin     Gramicidin D      Neosporin     Neomycin Sulfate      Neosporin     Polymyxin B      Neosporin     Polymyxin B Sulfate      Neosporin     Recent Labs   Lab Test 08/10/18  1018 01/17/18  1507 10/16/17  0523  10/14/17  1841 09/20/17  1050   A1C  --   --   --   --   --  5.6   ALT  --   --   --   --  11  --    CR 0.79  --  0.85   < > 1.11*  --    GFRESTIMATED 88  --  82   < > 60*  --    GFRESTBLACK >90  --  >90   < > 73  --    POTASSIUM 4.0  --  4.1   < > 4.5  --    TSH  --  1.03  --   --   --   --     < > = values in this interval not displayed.        BP Readings from Last 3 Encounters:   10/16/19 126/74   04/24/19 110/70   01/30/19 107/64    Wt Readings from Last 3 Encounters:   10/16/19 61.7 kg (136 lb)   04/24/19 61.7 kg (136 lb)   01/30/19 63 kg (139 lb)              Reviewed and updated as needed this visit by Provider         Review of Systems   ROS COMP: Constitutional, HEENT, cardiovascular, pulmonary, gi and gu systems are negative, except as otherwise noted.        Objective    /74   Pulse 90   Temp 98  F (36.7  C) (Tympanic)   Resp 19   Ht 1.524 m (5')   Wt 61.7 kg (136 lb)   SpO2 98%   BMI 26.56 kg/m    Body mass index is 26.56 kg/m .         Physical Exam   GENERAL: healthy, alert and no distress  NECK: no adenopathy, no asymmetry, masses, or scars and thyroid normal to palpation  RESP: lungs clear to auscultation - no rales, rhonchi or wheezes  CV: regular rate and rhythm, normal S1 S2, no S3  or S4, no murmur, click or rub, no peripheral edema and peripheral pulses strong  MS: no gross musculoskeletal defects noted, no edema      Mental Status Assessment:  Constitutional: awake, alert, and no apparent distress  Appearance:   Appropriate   Eye Contact:   Good   Psychomotor Behavior: Normal   Attitude:   Cooperative   Orientation:   All  Speech   Rate / Production: Normal    Volume:  Normal   Mood:    Normal  Affect:    Appropriate   Thought Content:  Clear   Thought Form:  Coherent  Logical   Insight:    Good             Assessment & Plan     1. Attention deficit hyperactivity disorder (ADHD), predominantly inattentive type  - amphetamine-dextroamphetamine (ADDERALL XR) 20 MG 24 hr capsule; Take 1 capsule (20 mg) by mouth daily  Dispense: 30 capsule; Refill: 0  - amphetamine-dextroamphetamine (ADDERALL XR) 20 MG 24 hr capsule; Take 1 capsule (20 mg) by mouth daily  Dispense: 30 capsule; Refill: 0  - amphetamine-dextroamphetamine (ADDERALL XR) 20 MG 24 hr capsule; Take 1 capsule (20 mg) by mouth daily  Dispense: 30 capsule; Refill: 0  - amphetamine-dextroamphetamine (ADDERALL XR) 10 MG 24 hr capsule; Take 1 capsule (10 mg) by mouth daily  Dispense: 30 capsule; Refill: 0  - amphetamine-dextroamphetamine (ADDERALL XR) 10 MG 24 hr capsule; Take 1 capsule (10 mg) by mouth daily  Dispense: 30 capsule; Refill: 0  - amphetamine-dextroamphetamine (ADDERALL XR) 10 MG 24 hr capsule; Take 1 capsule (10 mg) by mouth daily  Dispense: 30 capsule; Refill: 0    2. Anxiety  - Continue plan of care    3. Screen for STD (sexually transmitted disease)  - GC/Chlamydia by PCR - [facility]  - Treponema Abs w Reflex to RPR and Titer  - Wet prep         Return in about 6 months (around 4/16/2020).       Rosa Hanson CNP  [facility]

## 2022-02-02 ENCOUNTER — MYC REFILL (OUTPATIENT)
Dept: FAMILY MEDICINE | Facility: OTHER | Age: 27
End: 2022-02-02
Payer: COMMERCIAL

## 2022-02-02 DIAGNOSIS — F90.0 ATTENTION DEFICIT HYPERACTIVITY DISORDER (ADHD), PREDOMINANTLY INATTENTIVE TYPE: ICD-10-CM

## 2022-02-04 RX ORDER — DEXTROAMPHETAMINE SACCHARATE, AMPHETAMINE ASPARTATE MONOHYDRATE, DEXTROAMPHETAMINE SULFATE AND AMPHETAMINE SULFATE 5; 5; 5; 5 MG/1; MG/1; MG/1; MG/1
20 CAPSULE, EXTENDED RELEASE ORAL DAILY
Qty: 30 CAPSULE | Refills: 0 | OUTPATIENT
Start: 2022-02-04

## 2022-02-04 RX ORDER — DEXTROAMPHETAMINE SACCHARATE, AMPHETAMINE ASPARTATE MONOHYDRATE, DEXTROAMPHETAMINE SULFATE AND AMPHETAMINE SULFATE 2.5; 2.5; 2.5; 2.5 MG/1; MG/1; MG/1; MG/1
10 CAPSULE, EXTENDED RELEASE ORAL DAILY
Qty: 30 CAPSULE | Refills: 0 | OUTPATIENT
Start: 2022-02-04

## 2022-02-10 ENCOUNTER — MYC REFILL (OUTPATIENT)
Dept: FAMILY MEDICINE | Facility: OTHER | Age: 27
End: 2022-02-10
Payer: COMMERCIAL

## 2022-02-10 DIAGNOSIS — F90.0 ATTENTION DEFICIT HYPERACTIVITY DISORDER (ADHD), PREDOMINANTLY INATTENTIVE TYPE: ICD-10-CM

## 2022-02-10 NOTE — TELEPHONE ENCOUNTER
amphetamine-dextroamphetamine (ADDERALL XR) 20 MG 24 hr capsule      Last Written Prescription Date:  1/12/22  Last Fill Quantity: 30,   # refills: 0  Last Office Visit: 12/15/21  Future Office visit:       Routing refill request to provider for review/approval because:  Drug not on the [facility], P or [facility] refill protocol or controlled substance

## 2022-02-11 ENCOUNTER — MYC REFILL (OUTPATIENT)
Dept: FAMILY MEDICINE | Facility: OTHER | Age: 27
End: 2022-02-11
Payer: COMMERCIAL

## 2022-02-11 DIAGNOSIS — F90.0 ATTENTION DEFICIT HYPERACTIVITY DISORDER (ADHD), PREDOMINANTLY INATTENTIVE TYPE: ICD-10-CM

## 2022-02-11 RX ORDER — DEXTROAMPHETAMINE SACCHARATE, AMPHETAMINE ASPARTATE MONOHYDRATE, DEXTROAMPHETAMINE SULFATE AND AMPHETAMINE SULFATE 5; 5; 5; 5 MG/1; MG/1; MG/1; MG/1
20 CAPSULE, EXTENDED RELEASE ORAL DAILY
Qty: 30 CAPSULE | Refills: 0 | Status: SHIPPED | OUTPATIENT
Start: 2022-02-11 | End: 2022-02-11

## 2022-02-11 RX ORDER — DEXTROAMPHETAMINE SACCHARATE, AMPHETAMINE ASPARTATE MONOHYDRATE, DEXTROAMPHETAMINE SULFATE AND AMPHETAMINE SULFATE 2.5; 2.5; 2.5; 2.5 MG/1; MG/1; MG/1; MG/1
10 CAPSULE, EXTENDED RELEASE ORAL DAILY
Qty: 30 CAPSULE | Refills: 0 | Status: SHIPPED | OUTPATIENT
Start: 2022-02-11 | End: 2022-03-18

## 2022-02-11 RX ORDER — DEXTROAMPHETAMINE SACCHARATE, AMPHETAMINE ASPARTATE MONOHYDRATE, DEXTROAMPHETAMINE SULFATE AND AMPHETAMINE SULFATE 5; 5; 5; 5 MG/1; MG/1; MG/1; MG/1
20 CAPSULE, EXTENDED RELEASE ORAL DAILY
Qty: 30 CAPSULE | Refills: 0 | Status: SHIPPED | OUTPATIENT
Start: 2022-02-11 | End: 2022-04-10

## 2022-03-18 ENCOUNTER — MYC REFILL (OUTPATIENT)
Dept: FAMILY MEDICINE | Facility: OTHER | Age: 27
End: 2022-03-18
Payer: COMMERCIAL

## 2022-03-18 DIAGNOSIS — F90.0 ATTENTION DEFICIT HYPERACTIVITY DISORDER (ADHD), PREDOMINANTLY INATTENTIVE TYPE: ICD-10-CM

## 2022-03-18 RX ORDER — DEXTROAMPHETAMINE SACCHARATE, AMPHETAMINE ASPARTATE MONOHYDRATE, DEXTROAMPHETAMINE SULFATE AND AMPHETAMINE SULFATE 2.5; 2.5; 2.5; 2.5 MG/1; MG/1; MG/1; MG/1
10 CAPSULE, EXTENDED RELEASE ORAL DAILY
Qty: 30 CAPSULE | Refills: 0 | Status: SHIPPED | OUTPATIENT
Start: 2022-03-18 | End: 2022-04-19

## 2022-03-18 NOTE — TELEPHONE ENCOUNTER
amphetamine-dextroamphetamine (ADDERALL XR) 10 MG 24 hr capsule  Last Written Prescription Date:  2/11/22  Last Fill Quantity: 30,  # refills: 0   Last office visit: 12/15/2021   Future Office Visit:      Routing refill request to provider for review/approval because:  Drug not on the [facility] refill protocol

## 2022-04-06 DIAGNOSIS — F41.9 ANXIETY: ICD-10-CM

## 2022-04-07 RX ORDER — ESCITALOPRAM OXALATE 20 MG/1
TABLET ORAL
Qty: 90 TABLET | Refills: 1 | Status: SHIPPED | OUTPATIENT
Start: 2022-04-07 | End: 2022-10-07

## 2022-04-07 NOTE — TELEPHONE ENCOUNTER
LEXAPRO      Last Written Prescription Date:  12-  Last Fill Quantity: 90,   # refills: 1  Last Office Visit: 12-  Future Office visit:       Routing refill request to provider for review/approval because:

## 2022-04-10 ENCOUNTER — MYC REFILL (OUTPATIENT)
Dept: FAMILY MEDICINE | Facility: OTHER | Age: 27
End: 2022-04-10
Payer: COMMERCIAL

## 2022-04-10 DIAGNOSIS — F90.0 ATTENTION DEFICIT HYPERACTIVITY DISORDER (ADHD), PREDOMINANTLY INATTENTIVE TYPE: ICD-10-CM

## 2022-04-11 ENCOUNTER — MYC REFILL (OUTPATIENT)
Dept: FAMILY MEDICINE | Facility: OTHER | Age: 27
End: 2022-04-11
Payer: COMMERCIAL

## 2022-04-11 DIAGNOSIS — F90.0 ATTENTION DEFICIT HYPERACTIVITY DISORDER (ADHD), PREDOMINANTLY INATTENTIVE TYPE: ICD-10-CM

## 2022-04-11 RX ORDER — DEXTROAMPHETAMINE SACCHARATE, AMPHETAMINE ASPARTATE MONOHYDRATE, DEXTROAMPHETAMINE SULFATE AND AMPHETAMINE SULFATE 5; 5; 5; 5 MG/1; MG/1; MG/1; MG/1
20 CAPSULE, EXTENDED RELEASE ORAL DAILY
Qty: 30 CAPSULE | Refills: 0 | Status: SHIPPED | OUTPATIENT
Start: 2022-04-11 | End: 2022-04-22

## 2022-04-11 NOTE — TELEPHONE ENCOUNTER
adderall      Last Written Prescription Date:  2/11/22  Last Fill Quantity: 30,   # refills: 0  Last Office Visit: 12/15/21  Future Office visit:       Routing refill request to provider for review/approval because:  Drug not on the [facility], [facility] or [facility] refill protocol or controlled substance

## 2022-04-13 RX ORDER — DEXTROAMPHETAMINE SACCHARATE, AMPHETAMINE ASPARTATE MONOHYDRATE, DEXTROAMPHETAMINE SULFATE AND AMPHETAMINE SULFATE 5; 5; 5; 5 MG/1; MG/1; MG/1; MG/1
20 CAPSULE, EXTENDED RELEASE ORAL DAILY
Qty: 30 CAPSULE | Refills: 0 | OUTPATIENT
Start: 2022-04-13

## 2022-04-19 ENCOUNTER — MYC REFILL (OUTPATIENT)
Dept: FAMILY MEDICINE | Facility: OTHER | Age: 27
End: 2022-04-19
Payer: COMMERCIAL

## 2022-04-19 DIAGNOSIS — F90.0 ATTENTION DEFICIT HYPERACTIVITY DISORDER (ADHD), PREDOMINANTLY INATTENTIVE TYPE: ICD-10-CM

## 2022-04-21 ENCOUNTER — MYC REFILL (OUTPATIENT)
Dept: FAMILY MEDICINE | Facility: OTHER | Age: 27
End: 2022-04-21
Payer: COMMERCIAL

## 2022-04-21 DIAGNOSIS — F90.0 ATTENTION DEFICIT HYPERACTIVITY DISORDER (ADHD), PREDOMINANTLY INATTENTIVE TYPE: ICD-10-CM

## 2022-04-21 RX ORDER — DEXTROAMPHETAMINE SACCHARATE, AMPHETAMINE ASPARTATE MONOHYDRATE, DEXTROAMPHETAMINE SULFATE AND AMPHETAMINE SULFATE 2.5; 2.5; 2.5; 2.5 MG/1; MG/1; MG/1; MG/1
10 CAPSULE, EXTENDED RELEASE ORAL DAILY
Qty: 30 CAPSULE | Refills: 0 | OUTPATIENT
Start: 2022-04-21

## 2022-04-21 RX ORDER — DEXTROAMPHETAMINE SACCHARATE, AMPHETAMINE ASPARTATE MONOHYDRATE, DEXTROAMPHETAMINE SULFATE AND AMPHETAMINE SULFATE 2.5; 2.5; 2.5; 2.5 MG/1; MG/1; MG/1; MG/1
10 CAPSULE, EXTENDED RELEASE ORAL DAILY
Qty: 30 CAPSULE | Refills: 0 | Status: SHIPPED | OUTPATIENT
Start: 2022-04-21 | End: 2022-04-22

## 2022-04-21 NOTE — TELEPHONE ENCOUNTER
Adderall      Last Written Prescription Date:  3/18/22  Last Fill Quantity: 30,   # refills: 0  Last Office Visit: 12/15/21  Future Office visit:

## 2022-04-21 NOTE — TELEPHONE ENCOUNTER
amphetamine-dextroamphetamine (ADDERALL XR) 10 MG 24 hr capsule      Last Written Prescription Date:  3/18/22  Last Fill Quantity: 30,   # refills: 0  Last Office Visit: 12/15/21  Future Office visit:       Routing refill request to provider for review/approval because:  Drug not on the [facility], [facility] or [facility] refill protocol or controlled substance

## 2022-04-22 RX ORDER — DEXTROAMPHETAMINE SACCHARATE, AMPHETAMINE ASPARTATE MONOHYDRATE, DEXTROAMPHETAMINE SULFATE AND AMPHETAMINE SULFATE 2.5; 2.5; 2.5; 2.5 MG/1; MG/1; MG/1; MG/1
10 CAPSULE, EXTENDED RELEASE ORAL DAILY
Qty: 30 CAPSULE | Refills: 0 | Status: SHIPPED | OUTPATIENT
Start: 2022-04-22 | End: 2022-06-12

## 2022-04-22 RX ORDER — DEXTROAMPHETAMINE SACCHARATE, AMPHETAMINE ASPARTATE MONOHYDRATE, DEXTROAMPHETAMINE SULFATE AND AMPHETAMINE SULFATE 5; 5; 5; 5 MG/1; MG/1; MG/1; MG/1
20 CAPSULE, EXTENDED RELEASE ORAL DAILY
Qty: 30 CAPSULE | Refills: 0 | Status: SHIPPED | OUTPATIENT
Start: 2022-04-22 | End: 2022-06-12

## 2022-06-12 ENCOUNTER — MYC REFILL (OUTPATIENT)
Dept: FAMILY MEDICINE | Facility: OTHER | Age: 27
End: 2022-06-12
Payer: COMMERCIAL

## 2022-06-12 DIAGNOSIS — F90.0 ATTENTION DEFICIT HYPERACTIVITY DISORDER (ADHD), PREDOMINANTLY INATTENTIVE TYPE: ICD-10-CM

## 2022-06-14 RX ORDER — DEXTROAMPHETAMINE SACCHARATE, AMPHETAMINE ASPARTATE MONOHYDRATE, DEXTROAMPHETAMINE SULFATE AND AMPHETAMINE SULFATE 5; 5; 5; 5 MG/1; MG/1; MG/1; MG/1
20 CAPSULE, EXTENDED RELEASE ORAL DAILY
Qty: 30 CAPSULE | Refills: 0 | Status: SHIPPED | OUTPATIENT
Start: 2022-06-14 | End: 2022-07-18

## 2022-06-14 RX ORDER — DEXTROAMPHETAMINE SACCHARATE, AMPHETAMINE ASPARTATE MONOHYDRATE, DEXTROAMPHETAMINE SULFATE AND AMPHETAMINE SULFATE 2.5; 2.5; 2.5; 2.5 MG/1; MG/1; MG/1; MG/1
10 CAPSULE, EXTENDED RELEASE ORAL DAILY
Qty: 30 CAPSULE | Refills: 0 | Status: SHIPPED | OUTPATIENT
Start: 2022-06-14 | End: 2022-07-18

## 2022-06-14 NOTE — TELEPHONE ENCOUNTER
Adderall      Last Written Prescription Date:  4.22.22  Last Fill Quantity: #30, #30,   # refills: 0  Last Office Visit: 12.15.21  Future Office visit:       Routing refill request to provider for review/approval because:  Drug not on the [facility], [facility] or [facility] refill protocol or controlled substance

## 2022-07-07 ENCOUNTER — MYC REFILL (OUTPATIENT)
Dept: FAMILY MEDICINE | Facility: OTHER | Age: 27
End: 2022-07-07

## 2022-07-07 DIAGNOSIS — F90.0 ATTENTION DEFICIT HYPERACTIVITY DISORDER (ADHD), PREDOMINANTLY INATTENTIVE TYPE: ICD-10-CM

## 2022-07-08 RX ORDER — DEXTROAMPHETAMINE SACCHARATE, AMPHETAMINE ASPARTATE MONOHYDRATE, DEXTROAMPHETAMINE SULFATE AND AMPHETAMINE SULFATE 2.5; 2.5; 2.5; 2.5 MG/1; MG/1; MG/1; MG/1
10 CAPSULE, EXTENDED RELEASE ORAL DAILY
Qty: 30 CAPSULE | Refills: 0 | OUTPATIENT
Start: 2022-07-08

## 2022-07-18 DIAGNOSIS — F90.0 ATTENTION DEFICIT HYPERACTIVITY DISORDER (ADHD), PREDOMINANTLY INATTENTIVE TYPE: ICD-10-CM

## 2022-07-18 RX ORDER — DEXTROAMPHETAMINE SACCHARATE, AMPHETAMINE ASPARTATE MONOHYDRATE, DEXTROAMPHETAMINE SULFATE AND AMPHETAMINE SULFATE 2.5; 2.5; 2.5; 2.5 MG/1; MG/1; MG/1; MG/1
10 CAPSULE, EXTENDED RELEASE ORAL DAILY
Qty: 30 CAPSULE | Refills: 0 | Status: SHIPPED | OUTPATIENT
Start: 2022-07-18 | End: 2022-07-18

## 2022-07-18 RX ORDER — DEXTROAMPHETAMINE SACCHARATE, AMPHETAMINE ASPARTATE MONOHYDRATE, DEXTROAMPHETAMINE SULFATE AND AMPHETAMINE SULFATE 2.5; 2.5; 2.5; 2.5 MG/1; MG/1; MG/1; MG/1
10 CAPSULE, EXTENDED RELEASE ORAL DAILY
Qty: 30 CAPSULE | Refills: 0 | Status: SHIPPED | OUTPATIENT
Start: 2022-07-18 | End: 2022-08-02

## 2022-07-18 RX ORDER — DEXTROAMPHETAMINE SACCHARATE, AMPHETAMINE ASPARTATE MONOHYDRATE, DEXTROAMPHETAMINE SULFATE AND AMPHETAMINE SULFATE 5; 5; 5; 5 MG/1; MG/1; MG/1; MG/1
20 CAPSULE, EXTENDED RELEASE ORAL DAILY
Qty: 30 CAPSULE | Refills: 0 | Status: SHIPPED | OUTPATIENT
Start: 2022-07-18 | End: 2022-07-18

## 2022-07-18 RX ORDER — DEXTROAMPHETAMINE SACCHARATE, AMPHETAMINE ASPARTATE MONOHYDRATE, DEXTROAMPHETAMINE SULFATE AND AMPHETAMINE SULFATE 5; 5; 5; 5 MG/1; MG/1; MG/1; MG/1
20 CAPSULE, EXTENDED RELEASE ORAL DAILY
Qty: 30 CAPSULE | Refills: 0 | Status: SHIPPED | OUTPATIENT
Start: 2022-07-18 | End: 2022-08-02

## 2022-07-18 NOTE — TELEPHONE ENCOUNTER
Patient called and needs to cancel appt tomorrow. Her daughter is having surgery in [place]. Patient will reschedule but would like refill of adderall. Please advise

## 2022-08-02 ENCOUNTER — OFFICE VISIT (OUTPATIENT)
Dept: FAMILY MEDICINE | Facility: OTHER | Age: 27
End: 2022-08-02
Attending: NURSE PRACTITIONER
Payer: COMMERCIAL

## 2022-08-02 VITALS
RESPIRATION RATE: 16 BRPM | WEIGHT: 150.2 LBS | OXYGEN SATURATION: 99 % | DIASTOLIC BLOOD PRESSURE: 62 MMHG | SYSTOLIC BLOOD PRESSURE: 102 MMHG | BODY MASS INDEX: 29.33 KG/M2 | HEART RATE: 83 BPM | TEMPERATURE: 97 F

## 2022-08-02 DIAGNOSIS — F90.0 ATTENTION DEFICIT HYPERACTIVITY DISORDER (ADHD), PREDOMINANTLY INATTENTIVE TYPE: ICD-10-CM

## 2022-08-02 DIAGNOSIS — F41.9 ANXIETY: Primary | ICD-10-CM

## 2022-08-02 LAB — TSH SERPL DL<=0.005 MIU/L-ACNC: 0.54 MU/L (ref 0.4–4)

## 2022-08-02 PROCEDURE — 84443 ASSAY THYROID STIM HORMONE: CPT | Mod: ZL | Performed by: NURSE PRACTITIONER

## 2022-08-02 PROCEDURE — 99214 OFFICE O/P EST MOD 30 MIN: CPT | Performed by: NURSE PRACTITIONER

## 2022-08-02 PROCEDURE — 36415 COLL VENOUS BLD VENIPUNCTURE: CPT | Mod: ZL | Performed by: NURSE PRACTITIONER

## 2022-08-02 PROCEDURE — G0463 HOSPITAL OUTPT CLINIC VISIT: HCPCS

## 2022-08-02 RX ORDER — DEXTROAMPHETAMINE SACCHARATE, AMPHETAMINE ASPARTATE MONOHYDRATE, DEXTROAMPHETAMINE SULFATE AND AMPHETAMINE SULFATE 2.5; 2.5; 2.5; 2.5 MG/1; MG/1; MG/1; MG/1
10 CAPSULE, EXTENDED RELEASE ORAL DAILY
Qty: 30 CAPSULE | Refills: 0 | Status: SHIPPED | OUTPATIENT
Start: 2022-08-18 | End: 2022-11-18

## 2022-08-02 RX ORDER — DEXTROAMPHETAMINE SACCHARATE, AMPHETAMINE ASPARTATE MONOHYDRATE, DEXTROAMPHETAMINE SULFATE AND AMPHETAMINE SULFATE 5; 5; 5; 5 MG/1; MG/1; MG/1; MG/1
20 CAPSULE, EXTENDED RELEASE ORAL DAILY
Qty: 30 CAPSULE | Refills: 0 | Status: SHIPPED | OUTPATIENT
Start: 2022-08-18 | End: 2022-10-17

## 2022-08-02 ASSESSMENT — ANXIETY QUESTIONNAIRES
GAD7 TOTAL SCORE: 2
1. FEELING NERVOUS, ANXIOUS, OR ON EDGE: SEVERAL DAYS
2. NOT BEING ABLE TO STOP OR CONTROL WORRYING: NOT AT ALL
5. BEING SO RESTLESS THAT IT IS HARD TO SIT STILL: NOT AT ALL
7. FEELING AFRAID AS IF SOMETHING AWFUL MIGHT HAPPEN: NOT AT ALL
GAD7 TOTAL SCORE: 2
4. TROUBLE RELAXING: NOT AT ALL
3. WORRYING TOO MUCH ABOUT DIFFERENT THINGS: SEVERAL DAYS
6. BECOMING EASILY ANNOYED OR IRRITABLE: NOT AT ALL
IF YOU CHECKED OFF ANY PROBLEMS ON THIS QUESTIONNAIRE, HOW DIFFICULT HAVE THESE PROBLEMS MADE IT FOR YOU TO DO YOUR WORK, TAKE CARE OF THINGS AT HOME, OR GET ALONG WITH OTHER PEOPLE: SOMEWHAT DIFFICULT

## 2022-08-02 ASSESSMENT — PATIENT HEALTH QUESTIONNAIRE - PHQ9: SUM OF ALL RESPONSES TO PHQ QUESTIONS 1-9: 4

## 2022-08-02 ASSESSMENT — PAIN SCALES - GENERAL: PAINLEVEL: NO PAIN (0)

## 2022-08-02 NOTE — PATIENT INSTRUCTIONS
Assessment & Plan         Anxiety  - Continue plan of care      Attention deficit hyperactivity disorder (ADHD), predominantly inattentive type  - amphetamine-dextroamphetamine (ADDERALL XR) 10 MG 24 hr capsule; Take 1 capsule (10 mg) by mouth daily  - amphetamine-dextroamphetamine (ADDERALL XR) 20 MG 24 hr capsule; Take 1 capsule (20 mg) by mouth daily        TSH ordered      Return in about 6 months (around 2/2/2023).      Rosa Hanson, ERRLO  [facility]

## 2022-08-02 NOTE — LETTER
September 14, 2022      Dorina Thibodeaux  [address] [place] [address]        Dear Ms.Carlos Eduardo,    We are writing to inform you of your test results.    Normal      Resulted Orders   TSH with free T4 reflex   Result Value Ref Range    TSH 0.54 0.40 - 4.00 mU/L       If you have any questions or concerns, please call the clinic at the number listed above.       Sincerely,      Rosa Hanson, CNP

## 2022-08-02 NOTE — NURSING NOTE
Chief Complaint   Patient presents with     Anxiety     A.D.H.D       Initial /62 (BP Location: Left arm, Patient Position: Sitting, Cuff Size: Adult Regular)   Pulse 83   Temp 97  F (36.1  C) (Tympanic)   Resp 16   Wt 68.1 kg (150 lb 3.2 oz)   SpO2 99%   BMI 29.33 kg/m   Estimated body mass index is 29.33 kg/m  as calculated from the following:    Height as of 12/15/21: 1.524 m (5').    Weight as of this encounter: 68.1 kg (150 lb 3.2 oz).  Medication Reconciliation: complete  Brooke Camarena LPN

## 2022-08-02 NOTE — PROGRESS NOTES
Assessment & Plan       Anxiety  - Continue plan of care      Attention deficit hyperactivity disorder (ADHD), predominantly inattentive type  - amphetamine-dextroamphetamine (ADDERALL XR) 10 MG 24 hr capsule; Take 1 capsule (10 mg) by mouth daily  - amphetamine-dextroamphetamine (ADDERALL XR) 20 MG 24 hr capsule; Take 1 capsule (20 mg) by mouth daily      Return in about 6 months (around 2/2/2023).      Rosa Hanson, [facility] is a 27 year old, presenting for the following health issues:  Anxiety and A.D.H.D        ADD Follow up  Symptoms have been present for : Longstanding  Aggravating factors: NO  Relieving factors: Medication as prescribed  Recent stressors: NO  Drug or alcohol use: No  Past medications: see history  Therapy: No  Psychiatric history: ADD  Focus at home: Good   Ability to sequence tasks: Good  Weight stable: Yes  Suicidal Ideation or plan: No  Evident abuse or misuse of medication: No  Request for early refills: No      Anxiety Follow-Up    How are you doing with your anxiety since your last visit? Improved     Are you having other symptoms that might be associated with anxiety? No    Have you had a significant life event? No     Are you feeling depressed? No    Do you have any concerns with your use of alcohol or other drugs? No      Social History     Tobacco Use     Smoking status: Former Smoker     Years: 5.00     Types: Cigarettes     Smokeless tobacco: Never Used     Tobacco comment: tried to quit: no   Substance Use Topics     Alcohol use: Yes     Comment: occasionally     Drug use: No     BETSY-7 SCORE 5/18/2021 12/15/2021 8/2/2022   Total Score 4 (minimal anxiety) - -   Total Score 4 7 2     PHQ 5/18/2021 12/15/2021 8/2/2022   PHQ-9 Total Score 2 5 4   Q9: Thoughts of better off dead/self-harm past 2 weeks Not at all Not at all Not at all       Last PHQ-9 8/2/2022   1.  Little interest or pleasure in doing things 0   2.  Feeling down, depressed,  or hopeless 0   3.  Trouble falling or staying asleep, or sleeping too much 1   4.  Feeling tired or having little energy 1   5.  Poor appetite or overeating 0   6.  Feeling bad about yourself 1   7.  Trouble concentrating 1   8.  Moving slowly or restless 0   Q9: Thoughts of better off dead/self-harm past 2 weeks 0   PHQ-9 Total Score 4   Difficulty at work, home, or with people Somewhat difficult       BETSY-7  2022   1. Feeling nervous, anxious, or on edge 1   2. Not being able to stop or control worrying 0   3. Worrying too much about different things 1   4. Trouble relaxing 0   5. Being so restless that it is hard to sit still 0   6. Becoming easily annoyed or irritable 0   7. Feeling afraid, as if something awful might happen 0   BETSY-7 Total Score 2   If you checked any problems, how difficult have they made it for you to do your work, take care of things at home, or get along with other people? Somewhat difficult       Patient Active Problem List   Diagnosis     ADHD (attention deficit hyperactivity disorder)     Anxiety     Past Surgical History:   Procedure Laterality Date      SECTION N/A 10/15/2017    Procedure:  SECTION;   Section;  Surgeon: Ramez Marquez MD;  Location: [facility]     ORTHOPEDIC SURGERY  2018    rt wrist , cyst removed     surgically repaired      blocked tear duct - left     TONSILLECTOMY      tonsillitis       Social History     Tobacco Use     Smoking status: Former Smoker     Years: 5.00     Types: Cigarettes     Smokeless tobacco: Never Used     Tobacco comment: tried to quit: no   Substance Use Topics     Alcohol use: Yes     Comment: occasionally     Family History   Problem Relation Age of Onset     Diabetes Maternal Grandfather      Heart Disease Maternal Grandfather         heart disease     Myocardial Infarction Paternal Aunt      Myocardial Infarction Paternal Grandfather         cause of death     Coronary Artery Disease Paternal Grandfather       Myocardial Infarction Paternal Uncle      Coronary Artery Disease Paternal Uncle      Coronary Artery Disease Paternal Aunt            Current Outpatient Medications   Medication Sig Dispense Refill     amphetamine-dextroamphetamine (ADDERALL XR) 10 MG 24 hr capsule Take 1 capsule (10 mg) by mouth daily 30 capsule 0     amphetamine-dextroamphetamine (ADDERALL XR) 20 MG 24 hr capsule Take 1 capsule (20 mg) by mouth daily 30 capsule 0     escitalopram (LEXAPRO) 10 MG tablet TAKE 1 TABLET BY MOUTH EVERY DAY ALONG WITH 20MG TABLET 90 tablet 0     escitalopram (LEXAPRO) 20 MG tablet TAKE 1 TABLET(20 MG) BY MOUTH DAILY 90 tablet 1     levonorgestrel (MIRENA) 20 MCG/24HR IUD 1 each (20 mcg) by Intrauterine route continuous         Allergies   Allergen Reactions     Bacitracin      Neosporin-Pt. Had eye swelling as a baby, Pt. States not even sure if she still has an allergy to neosporin     Bacitracin Zinc      Neosporin     Gramicidin D      Neosporin     Neomycin Sulfate      Neosporin     Polymyxin B      Neosporin     Polymyxin B Sulfate      Neosporin         Recent Labs   Lab Test 08/10/18  1018 01/17/18  1507 10/16/17  0523 10/15/17  1102 10/14/17  1841 09/20/17  1050   A1C  --   --   --   --   --  5.6   ALT  --   --   --   --  11  --    CR 0.79  --  0.85   < > 1.11*  --    GFRESTIMATED 88  --  82   < > 60*  --    GFRESTBLACK >90  --  >90   < > 73  --    POTASSIUM 4.0  --  4.1   < > 4.5  --    TSH  --  1.03  --   --   --   --     < > = values in this interval not displayed.          BP Readings from Last 3 Encounters:   08/02/22 102/62   12/15/21 118/68   12/03/20 104/60    Wt Readings from Last 3 Encounters:   08/02/22 68.1 kg (150 lb 3.2 oz)   12/15/21 62.6 kg (138 lb 1.6 oz)   10/13/20 64.9 kg (143 lb)                 Review of Systems   Constitutional, HEENT, cardiovascular, pulmonary, GI, , musculoskeletal, neuro, skin, endocrine and psych systems are negative, except as otherwise noted.          Objective     /62 (BP Location: Left arm, Patient Position: Sitting, Cuff Size: Adult Regular)   Pulse 83   Temp 97  F (36.1  C) (Tympanic)   Resp 16   Wt 68.1 kg (150 lb 3.2 oz)   SpO2 99%   BMI 29.33 kg/m    Body mass index is 29.33 kg/m .         Physical Exam   GENERAL: healthy, alert and no distress  NECK: no adenopathy, no asymmetry, masses, or scars and thyroid normal to palpation  RESP: lungs clear to auscultation - no rales, rhonchi or wheezes  CV: regular rate and rhythm, normal S1 S2, no S3 or S4, no murmur, click or rub, no peripheral edema and peripheral pulses strong  SKIN: no suspicious lesions or rashes  PSYCH: mentation appears normal, affect normal/bright

## 2022-09-16 DIAGNOSIS — F41.9 ANXIETY: ICD-10-CM

## 2022-09-16 RX ORDER — ESCITALOPRAM OXALATE 10 MG/1
TABLET ORAL
Qty: 90 TABLET | Refills: 0 | Status: SHIPPED | OUTPATIENT
Start: 2022-09-16 | End: 2022-12-16

## 2022-10-06 DIAGNOSIS — F41.9 ANXIETY: ICD-10-CM

## 2022-10-07 RX ORDER — ESCITALOPRAM OXALATE 20 MG/1
TABLET ORAL
Qty: 90 TABLET | Refills: 1 | Status: SHIPPED | OUTPATIENT
Start: 2022-10-07 | End: 2023-04-19

## 2022-10-17 ENCOUNTER — MYC REFILL (OUTPATIENT)
Dept: FAMILY MEDICINE | Facility: OTHER | Age: 27
End: 2022-10-17

## 2022-10-17 DIAGNOSIS — F90.0 ATTENTION DEFICIT HYPERACTIVITY DISORDER (ADHD), PREDOMINANTLY INATTENTIVE TYPE: ICD-10-CM

## 2022-10-18 RX ORDER — DEXTROAMPHETAMINE SACCHARATE, AMPHETAMINE ASPARTATE MONOHYDRATE, DEXTROAMPHETAMINE SULFATE AND AMPHETAMINE SULFATE 5; 5; 5; 5 MG/1; MG/1; MG/1; MG/1
20 CAPSULE, EXTENDED RELEASE ORAL DAILY
Qty: 30 CAPSULE | Refills: 0 | Status: SHIPPED | OUTPATIENT
Start: 2022-10-18 | End: 2022-11-18

## 2022-10-18 NOTE — TELEPHONE ENCOUNTER
ADDERALL XR      Last Written Prescription Date:  8/2/22  Last Fill Quantity: 30,   # refills: 0  Last Office Visit: 8/2/22  Future Office visit:       Routing refill request to provider for review/approval because:

## 2022-11-18 ENCOUNTER — MYC REFILL (OUTPATIENT)
Dept: FAMILY MEDICINE | Facility: OTHER | Age: 27
End: 2022-11-18

## 2022-11-18 DIAGNOSIS — F90.0 ATTENTION DEFICIT HYPERACTIVITY DISORDER (ADHD), PREDOMINANTLY INATTENTIVE TYPE: ICD-10-CM

## 2022-11-18 RX ORDER — DEXTROAMPHETAMINE SACCHARATE, AMPHETAMINE ASPARTATE MONOHYDRATE, DEXTROAMPHETAMINE SULFATE AND AMPHETAMINE SULFATE 2.5; 2.5; 2.5; 2.5 MG/1; MG/1; MG/1; MG/1
10 CAPSULE, EXTENDED RELEASE ORAL DAILY
Qty: 30 CAPSULE | Refills: 0 | Status: SHIPPED | OUTPATIENT
Start: 2022-11-18 | End: 2022-12-16

## 2022-11-18 RX ORDER — DEXTROAMPHETAMINE SACCHARATE, AMPHETAMINE ASPARTATE MONOHYDRATE, DEXTROAMPHETAMINE SULFATE AND AMPHETAMINE SULFATE 5; 5; 5; 5 MG/1; MG/1; MG/1; MG/1
20 CAPSULE, EXTENDED RELEASE ORAL DAILY
Qty: 30 CAPSULE | Refills: 0 | Status: SHIPPED | OUTPATIENT
Start: 2022-11-18 | End: 2022-12-16

## 2022-11-18 NOTE — TELEPHONE ENCOUNTER
ADDERALL XR 10mg      Last Written Prescription Date:  8/2/22  Last Fill Quantity: 30,   # refills: 0  Last Office Visit: 8/2/22  Future Office visit:    Next 5 appointments (look out 90 days)    Feb 06, 2023  3:30 PM  (Arrive by 3:15 PM)  SHORT with Rosa Hanson CNP  [facility] ([facility] ) [address]  [place] [address]  [phone]           Routing refill request to provider for review/approval because:        ADDERALL XR 20mg      Last Written Prescription Date:  10/18/22  Last Fill Quantity: 30,   # refills: 0  Last Office Visit: 8/2/22  Future Office visit:    Next 5 appointments (look out 90 days)    Feb 06, 2023  3:30 PM  (Arrive by 3:15 PM)  SHORT with Rosa Hanson CNP  [facility] ([facility] ) [address]  [place] [address]  [phone]           Routing refill request to provider for review/approval because:

## 2022-12-16 ENCOUNTER — MYC REFILL (OUTPATIENT)
Dept: FAMILY MEDICINE | Facility: OTHER | Age: 27
End: 2022-12-16

## 2022-12-16 DIAGNOSIS — F41.9 ANXIETY: ICD-10-CM

## 2022-12-16 DIAGNOSIS — F90.0 ATTENTION DEFICIT HYPERACTIVITY DISORDER (ADHD), PREDOMINANTLY INATTENTIVE TYPE: ICD-10-CM

## 2022-12-16 RX ORDER — DEXTROAMPHETAMINE SACCHARATE, AMPHETAMINE ASPARTATE MONOHYDRATE, DEXTROAMPHETAMINE SULFATE AND AMPHETAMINE SULFATE 2.5; 2.5; 2.5; 2.5 MG/1; MG/1; MG/1; MG/1
10 CAPSULE, EXTENDED RELEASE ORAL DAILY
Qty: 30 CAPSULE | Refills: 0 | Status: SHIPPED | OUTPATIENT
Start: 2022-12-16 | End: 2023-01-19

## 2022-12-16 RX ORDER — DEXTROAMPHETAMINE SACCHARATE, AMPHETAMINE ASPARTATE MONOHYDRATE, DEXTROAMPHETAMINE SULFATE AND AMPHETAMINE SULFATE 5; 5; 5; 5 MG/1; MG/1; MG/1; MG/1
20 CAPSULE, EXTENDED RELEASE ORAL DAILY
Qty: 30 CAPSULE | Refills: 0 | Status: SHIPPED | OUTPATIENT
Start: 2022-12-16 | End: 2023-01-19

## 2022-12-16 RX ORDER — ESCITALOPRAM OXALATE 10 MG/1
TABLET ORAL
Qty: 90 TABLET | Refills: 0 | Status: SHIPPED | OUTPATIENT
Start: 2022-12-16 | End: 2023-03-17

## 2022-12-16 NOTE — TELEPHONE ENCOUNTER
Lexapro 10     Last Written Prescription Date:  9/16/22  Last Fill Quantity: 90,   # refills: 0  Last Office Visit: 8/2/22  Future Office visit:    Next 5 appointments (look out 90 days)    Feb 06, 2023  3:30 PM  (Arrive by 3:15 PM)  SHORT with Rosa Hanson CNP  [facility] ([facility] ) [address]  [place] [address]  [phone]

## 2022-12-16 NOTE — TELEPHONE ENCOUNTER
Adderall 10      Last Written Prescription Date:  11/18/22  Last Fill Quantity: 30,   # refills: 0    Adderall 20      Last Written Prescription Date:  11/18/22  Last Fill Quantity: 30,   # refills: 0  Last Office Visit: 8/2/22  Future Office visit:    Next 5 appointments (look out 90 days)    Feb 06, 2023  3:30 PM  (Arrive by 3:15 PM)  SHORT with Rosa Hanson CNP  [facility] ([facility] ) [address]  [place] [address]  [phone]

## 2023-01-17 ENCOUNTER — MYC REFILL (OUTPATIENT)
Dept: FAMILY MEDICINE | Facility: OTHER | Age: 28
End: 2023-01-17

## 2023-01-17 DIAGNOSIS — F90.0 ATTENTION DEFICIT HYPERACTIVITY DISORDER (ADHD), PREDOMINANTLY INATTENTIVE TYPE: ICD-10-CM

## 2023-01-17 RX ORDER — DEXTROAMPHETAMINE SACCHARATE, AMPHETAMINE ASPARTATE MONOHYDRATE, DEXTROAMPHETAMINE SULFATE AND AMPHETAMINE SULFATE 2.5; 2.5; 2.5; 2.5 MG/1; MG/1; MG/1; MG/1
10 CAPSULE, EXTENDED RELEASE ORAL DAILY
Qty: 30 CAPSULE | Refills: 0 | Status: CANCELLED | OUTPATIENT
Start: 2023-01-17

## 2023-01-17 RX ORDER — DEXTROAMPHETAMINE SACCHARATE, AMPHETAMINE ASPARTATE MONOHYDRATE, DEXTROAMPHETAMINE SULFATE AND AMPHETAMINE SULFATE 5; 5; 5; 5 MG/1; MG/1; MG/1; MG/1
20 CAPSULE, EXTENDED RELEASE ORAL DAILY
Qty: 30 CAPSULE | Refills: 0 | Status: CANCELLED | OUTPATIENT
Start: 2023-01-17

## 2023-01-19 ENCOUNTER — MYC REFILL (OUTPATIENT)
Dept: FAMILY MEDICINE | Facility: OTHER | Age: 28
End: 2023-01-19

## 2023-01-19 DIAGNOSIS — F90.0 ATTENTION DEFICIT HYPERACTIVITY DISORDER (ADHD), PREDOMINANTLY INATTENTIVE TYPE: ICD-10-CM

## 2023-01-19 RX ORDER — DEXTROAMPHETAMINE SACCHARATE, AMPHETAMINE ASPARTATE MONOHYDRATE, DEXTROAMPHETAMINE SULFATE AND AMPHETAMINE SULFATE 5; 5; 5; 5 MG/1; MG/1; MG/1; MG/1
20 CAPSULE, EXTENDED RELEASE ORAL DAILY
Qty: 30 CAPSULE | Refills: 0 | Status: SHIPPED | OUTPATIENT
Start: 2023-01-19 | End: 2023-02-17

## 2023-01-19 RX ORDER — DEXTROAMPHETAMINE SACCHARATE, AMPHETAMINE ASPARTATE MONOHYDRATE, DEXTROAMPHETAMINE SULFATE AND AMPHETAMINE SULFATE 2.5; 2.5; 2.5; 2.5 MG/1; MG/1; MG/1; MG/1
10 CAPSULE, EXTENDED RELEASE ORAL DAILY
Qty: 30 CAPSULE | Refills: 0 | Status: SHIPPED | OUTPATIENT
Start: 2023-01-19 | End: 2023-02-17

## 2023-01-19 NOTE — TELEPHONE ENCOUNTER
Pt calling and is leaving out of town tomorrow.    Adderall XR 10mg    Last Written Prescription Date:  12.16.2022  Last Fill Quantity: 30,   # refills: 0  Last Office Visit: 8.2.2022  Future Office visit:    Next 5 appointments (look out 90 days)    Feb 06, 2023  3:30 PM  (Arrive by 3:15 PM)  SHORT with Rosa Hanson CNP  [facility] ([facility] ) [address]  [facility] [address]  [phone]           Routing refill request to provider for review/approval because:  Drug not on the [facility], [facility] or [facility] refill protocol or controlled substance      Adderall XR 20mg      Last Written Prescription Date:  12.16.2022  Last Fill Quantity: 30,   # refills: 0  Last Office Visit:   Future Office visit:    Next 5 appointments (look out 90 days)    Feb 06, 2023  3:30 PM  (Arrive by 3:15 PM)  SHORT with Rosa Hanson CNP  [facility] ([facility] ) [address]  [place] [address]  [phone]           Routing refill request to provider for review/approval because:  Drug not on the [facility], [facility] or [facility] refill protocol or controlled substance    Miley Ashford RN

## 2023-02-03 NOTE — PROGRESS NOTES
Assessment & Plan       Attention deficit hyperactivity disorder (ADHD), predominantly inattentive type  - Continue plan of care      Anxiety  - Continue plan of care      OB visit ordered for Pap and to discuss Mirena        Return in about 6 months (around 8/6/2023).        Rosa Hanson CNP  [facility] [place] is a 27 year old, presenting for the following health issues:  Anxiety and A.D.H.D        Anxiety Follow-Up    How are you doing with your anxiety since your last visit? Worsened Has gone up, seasonal    Are you having other symptoms that might be associated with anxiety? No    Have you had a significant life event? No     Are you feeling depressed? No    Do you have any concerns with your use of alcohol or other drugs? No        Social History     Tobacco Use     Smoking status: Former     Years: 5.00     Types: Cigarettes     Smokeless tobacco: Never     Tobacco comments:     tried to quit: no   Substance Use Topics     Alcohol use: Yes     Comment: occasionally     Drug use: No           BETSY-7 SCORE 12/15/2021 8/2/2022 2/6/2023   Total Score - - -   Total Score 7 2 7           PHQ 12/15/2021 8/2/2022 2/6/2023   PHQ-9 Total Score 5 4 3   Q9: Thoughts of better off dead/self-harm past 2 weeks Not at all Not at all Not at all           Last PHQ-9 2/6/2023   1.  Little interest or pleasure in doing things 0   2.  Feeling down, depressed, or hopeless 0   3.  Trouble falling or staying asleep, or sleeping too much 0   4.  Feeling tired or having little energy 1   5.  Poor appetite or overeating 0   6.  Feeling bad about yourself 0   7.  Trouble concentrating 2   8.  Moving slowly or restless 0   Q9: Thoughts of better off dead/self-harm past 2 weeks 0   PHQ-9 Total Score 3   Difficulty at work, home, or with people Somewhat difficult     BETSY-7  2/6/2023   1. Feeling nervous, anxious, or on edge 0   2. Not being able to stop or control worrying 2   3. Worrying too much about  different things 2   4. Trouble relaxing 1   5. Being so restless that it is hard to sit still 1   6. Becoming easily annoyed or irritable 1   7. Feeling afraid, as if something awful might happen 0   BETSY-7 Total Score 7   If you checked any problems, how difficult have they made it for you to do your work, take care of things at home, or get along with other people? Not difficult at all         ADHD    Onset: 7 year(s) ago     Description:   Easily distracted: YES  Short attention span: Somewhat  Trouble following directions: No   Impulsive behavior: No   Trouble completing tasks: No    Accompanying Signs & Symptoms:        Change in sleep pattern: no  Irritability at certain times of the day: Not specific times of day.  Socially withdrawn: No  Depression symptoms: No  Anxiety symptoms: YES, worrying    History:  Caffeine intake: Small  Loss of appetite: No  Healthy diet: No  Did you have problems in school or with previous employment: No  Family history of ADHD: No  Have you had an evaluation for ADHD in the past: YES  Do you use alcohol or drugs: YES Alcohol occasionally    Therapies tried: Adderall (concerta) with moderate relief          Patient Active Problem List   Diagnosis     ADHD (attention deficit hyperactivity disorder)     Anxiety     Past Surgical History:   Procedure Laterality Date      SECTION N/A 10/15/2017    Procedure:  SECTION;   Section;  Surgeon: Ramez Marquez MD;  Location: [facility]     ORTHOPEDIC SURGERY  2018    rt wrist , cyst removed     surgically repaired      blocked tear duct - left     TONSILLECTOMY      tonsillitis       Social History     Tobacco Use     Smoking status: Former     Years: 5.00     Types: Cigarettes     Smokeless tobacco: Never     Tobacco comments:     tried to quit: no   Substance Use Topics     Alcohol use: Yes     Comment: occasionally     Family History   Problem Relation Age of Onset     Diabetes Maternal Grandfather      Heart Disease  Maternal Grandfather         heart disease     Myocardial Infarction Paternal Aunt      Myocardial Infarction Paternal Grandfather         cause of death     Coronary Artery Disease Paternal Grandfather      Myocardial Infarction Paternal Uncle      Coronary Artery Disease Paternal Uncle      Coronary Artery Disease Paternal Aunt              Current Outpatient Medications   Medication Sig Dispense Refill     amphetamine-dextroamphetamine (ADDERALL XR) 10 MG 24 hr capsule Take 1 capsule (10 mg) by mouth daily 30 capsule 0     amphetamine-dextroamphetamine (ADDERALL XR) 20 MG 24 hr capsule Take 1 capsule (20 mg) by mouth daily 30 capsule 0     escitalopram (LEXAPRO) 10 MG tablet TAKE 1 TABLET BY MOUTH EVERY DAY 90 tablet 0     escitalopram (LEXAPRO) 20 MG tablet TAKE 1 TABLET(20 MG) BY MOUTH DAILY 90 tablet 1     levonorgestrel (MIRENA) 20 MCG/24HR IUD 1 each (20 mcg) by Intrauterine route continuous           Allergies   Allergen Reactions     Bacitracin      Neosporin-Pt. Had eye swelling as a baby, Pt. States not even sure if she still has an allergy to neosporin     Bacitracin Zinc      Neosporin     Gramicidin D      Neosporin     Neomycin Sulfate      Neosporin     Polymyxin B      Neosporin     Polymyxin B Sulfate      Neosporin         Recent Labs   Lab Test 08/02/22  1350 08/10/18  1018 01/17/18  1507 10/16/17  0523 10/15/17  1102 10/14/17  1841 09/20/17  1050   A1C  --   --   --   --   --   --  5.6   ALT  --   --   --   --   --  11  --    CR  --  0.79  --  0.85   < > 1.11*  --    GFRESTIMATED  --  88  --  82   < > 60*  --    GFRESTBLACK  --  >90  --  >90   < > 73  --    POTASSIUM  --  4.0  --  4.1   < > 4.5  --    TSH 0.54  --  1.03  --   --   --   --     < > = values in this interval not displayed.        BP Readings from Last 3 Encounters:   02/06/23 118/80   08/02/22 102/62   12/15/21 118/68    Wt Readings from Last 3 Encounters:   08/02/22 68.1 kg (150 lb 3.2 oz)   12/15/21 62.6 kg (138 lb 1.6 oz)    10/13/20 64.9 kg (143 lb)                Review of Systems   Constitutional, HEENT, cardiovascular, pulmonary, gi and gu systems are negative, except as otherwise noted.            Objective    /80   Pulse 87   Temp 98.4  F (36.9  C) (Tympanic)   Resp 16   Ht 1.524 m (5')   SpO2 98%   BMI 29.33 kg/m    Body mass index is 29.33 kg/m .           Physical Exam   GENERAL: healthy, alert and no distress  NECK: no adenopathy, no asymmetry, masses, or scars and thyroid normal to palpation  RESP: lungs clear to auscultation - no rales, rhonchi or wheezes  CV: regular rate and rhythm, normal S1 S2, no S3 or S4, no murmur, click or rub, no peripheral edema and peripheral pulses strong  SKIN: no suspicious lesions or rashes  PSYCH: mentation appears normal, affect normal/bright

## 2023-02-06 ENCOUNTER — OFFICE VISIT (OUTPATIENT)
Dept: FAMILY MEDICINE | Facility: OTHER | Age: 28
End: 2023-02-06
Attending: NURSE PRACTITIONER
Payer: COMMERCIAL

## 2023-02-06 VITALS
TEMPERATURE: 98.4 F | DIASTOLIC BLOOD PRESSURE: 80 MMHG | HEIGHT: 60 IN | SYSTOLIC BLOOD PRESSURE: 118 MMHG | BODY MASS INDEX: 29.33 KG/M2 | RESPIRATION RATE: 16 BRPM | OXYGEN SATURATION: 98 % | HEART RATE: 87 BPM

## 2023-02-06 DIAGNOSIS — F41.9 ANXIETY: ICD-10-CM

## 2023-02-06 DIAGNOSIS — F90.0 ATTENTION DEFICIT HYPERACTIVITY DISORDER (ADHD), PREDOMINANTLY INATTENTIVE TYPE: Primary | ICD-10-CM

## 2023-02-06 DIAGNOSIS — Z12.4 SCREENING FOR MALIGNANT NEOPLASM OF CERVIX: ICD-10-CM

## 2023-02-06 PROCEDURE — G0463 HOSPITAL OUTPT CLINIC VISIT: HCPCS

## 2023-02-06 PROCEDURE — 99213 OFFICE O/P EST LOW 20 MIN: CPT | Performed by: NURSE PRACTITIONER

## 2023-02-06 ASSESSMENT — ANXIETY QUESTIONNAIRES
GAD7 TOTAL SCORE: 7
2. NOT BEING ABLE TO STOP OR CONTROL WORRYING: MORE THAN HALF THE DAYS
GAD7 TOTAL SCORE: 7
3. WORRYING TOO MUCH ABOUT DIFFERENT THINGS: MORE THAN HALF THE DAYS
IF YOU CHECKED OFF ANY PROBLEMS ON THIS QUESTIONNAIRE, HOW DIFFICULT HAVE THESE PROBLEMS MADE IT FOR YOU TO DO YOUR WORK, TAKE CARE OF THINGS AT HOME, OR GET ALONG WITH OTHER PEOPLE: NOT DIFFICULT AT ALL
7. FEELING AFRAID AS IF SOMETHING AWFUL MIGHT HAPPEN: NOT AT ALL
5. BEING SO RESTLESS THAT IT IS HARD TO SIT STILL: SEVERAL DAYS
1. FEELING NERVOUS, ANXIOUS, OR ON EDGE: NOT AT ALL
4. TROUBLE RELAXING: SEVERAL DAYS
6. BECOMING EASILY ANNOYED OR IRRITABLE: SEVERAL DAYS

## 2023-02-06 ASSESSMENT — PAIN SCALES - GENERAL: PAINLEVEL: NO PAIN (0)

## 2023-02-06 ASSESSMENT — PATIENT HEALTH QUESTIONNAIRE - PHQ9: SUM OF ALL RESPONSES TO PHQ QUESTIONS 1-9: 3

## 2023-02-06 NOTE — PATIENT INSTRUCTIONS
Assessment & Plan       Attention deficit hyperactivity disorder (ADHD), predominantly inattentive type  - Continue plan of care      Anxiety  - Continue plan of care        OB visit ordered for Pap and to discuss Mirena        Return in about 6 months (around 8/6/2023).        Rosa Hanson CNP  [facility] [place] [facility]

## 2023-02-08 ENCOUNTER — TELEPHONE (OUTPATIENT)
Dept: FAMILY MEDICINE | Facility: OTHER | Age: 28
End: 2023-02-08

## 2023-02-08 NOTE — LETTER
February 8, 2023      [facility] [id number]        To Whom It May Concern,      Please excuse Valeria from work on 02/08/2023.        Sincerely,        Rosa Hanson, CNP

## 2023-02-08 NOTE — TELEPHONE ENCOUNTER
Patient called asking for work excuse for today 02/08/23.  Patient's daughter was seen by Rosa and diagnosed with UTI and strep throat.  Patient asking for letter to be printed and she will  at registration.

## 2023-02-17 DIAGNOSIS — F90.0 ATTENTION DEFICIT HYPERACTIVITY DISORDER (ADHD), PREDOMINANTLY INATTENTIVE TYPE: ICD-10-CM

## 2023-02-17 RX ORDER — DEXTROAMPHETAMINE SACCHARATE, AMPHETAMINE ASPARTATE MONOHYDRATE, DEXTROAMPHETAMINE SULFATE AND AMPHETAMINE SULFATE 5; 5; 5; 5 MG/1; MG/1; MG/1; MG/1
20 CAPSULE, EXTENDED RELEASE ORAL DAILY
Qty: 30 CAPSULE | Refills: 0 | Status: SHIPPED | OUTPATIENT
Start: 2023-02-17 | End: 2023-03-16

## 2023-02-17 RX ORDER — DEXTROAMPHETAMINE SACCHARATE, AMPHETAMINE ASPARTATE MONOHYDRATE, DEXTROAMPHETAMINE SULFATE AND AMPHETAMINE SULFATE 2.5; 2.5; 2.5; 2.5 MG/1; MG/1; MG/1; MG/1
10 CAPSULE, EXTENDED RELEASE ORAL DAILY
Qty: 30 CAPSULE | Refills: 0 | Status: SHIPPED | OUTPATIENT
Start: 2023-02-17 | End: 2023-03-16

## 2023-02-23 ENCOUNTER — OFFICE VISIT (OUTPATIENT)
Dept: OBGYN | Facility: OTHER | Age: 28
End: 2023-02-23
Attending: OBSTETRICS & GYNECOLOGY
Payer: COMMERCIAL

## 2023-02-23 VITALS
HEART RATE: 70 BPM | SYSTOLIC BLOOD PRESSURE: 108 MMHG | BODY MASS INDEX: 29.06 KG/M2 | DIASTOLIC BLOOD PRESSURE: 74 MMHG | TEMPERATURE: 97.4 F | RESPIRATION RATE: 16 BRPM | WEIGHT: 148 LBS | HEIGHT: 60 IN

## 2023-02-23 DIAGNOSIS — Z30.430 ENCOUNTER FOR IUD INSERTION: Primary | ICD-10-CM

## 2023-02-23 DIAGNOSIS — Z30.432 ENCOUNTER FOR IUD REMOVAL: ICD-10-CM

## 2023-02-23 DIAGNOSIS — Z12.4 SCREENING FOR MALIGNANT NEOPLASM OF CERVIX: ICD-10-CM

## 2023-02-23 PROCEDURE — 99213 OFFICE O/P EST LOW 20 MIN: CPT | Mod: 25 | Performed by: OBSTETRICS & GYNECOLOGY

## 2023-02-23 PROCEDURE — 58300 INSERT INTRAUTERINE DEVICE: CPT | Performed by: OBSTETRICS & GYNECOLOGY

## 2023-02-23 PROCEDURE — G0463 HOSPITAL OUTPT CLINIC VISIT: HCPCS | Mod: 25

## 2023-02-23 PROCEDURE — G0123 SCREEN CERV/VAG THIN LAYER: HCPCS | Mod: ZL | Performed by: OBSTETRICS & GYNECOLOGY

## 2023-02-23 PROCEDURE — 250N000011 HC RX IP 250 OP 636: Performed by: OBSTETRICS & GYNECOLOGY

## 2023-02-23 RX ADMIN — LEVONORGESTREL 20 MCG: 52 INTRAUTERINE DEVICE INTRAUTERINE at 15:25

## 2023-02-23 ASSESSMENT — PAIN SCALES - GENERAL: PAINLEVEL: NO PAIN (0)

## 2023-02-23 NOTE — PROGRESS NOTES
CHIEF COMPLAINT / REASON FOR VISIT  Patient requests IUD replacement for long term contraction.    HISTORY OF PRESENT ILLNESS  Valeria Thibodeaux is a 27 year old  with No LMP recorded. (Menstrual status: IUD). who presents requesting IUD replacement for long-term contraception. She has a Mirena IUD which was placed on 2018. She would like to remove her current IUD, which is expiring, and replace it with a new Mirena IUD. She is happy with her current IUD and denies any problems.  She is amenorrheic with Mirena IUD.  The patient is also due for a screening Pap smear.  The patient sexually active denies dyspareunia or postcoital spotting.  No abnormal vaginal discharge, itching, irritation, malodor, or bleeding.  She denies difficulty urinating, dysuria, hematuria, or flank pain.  No nausea, vomiting, diarrhea, change in bowel habits.    MENSTRUAL HISTORY  Menarche was at age 10.  Menses: Amenorrhea with Mirena IUD.  Intermenstrual bleeding: Denies.  Dysmenorrhea: Denies.  She is sexually active and denies issues with intercourse.   Dyspareunia: Denies.  Postcoital spotting: Denies.  Current contraception: IUD.  STD History: No STD history.  Last Pap smear history: Normal.  Mammogram history: N/A.    ALLERGIES     Allergies   Allergen Reactions     Bacitracin      Neosporin-Pt. Had eye swelling as a baby, Pt. States not even sure if she still has an allergy to neosporin     Bacitracin Zinc      Neosporin     Gramicidin D      Neosporin     Neomycin Sulfate      Neosporin     Polymyxin B      Neosporin     Polymyxin B Sulfate      Neosporin       MEDICATIONS    Current Outpatient Medications:      amphetamine-dextroamphetamine (ADDERALL XR) 10 MG 24 hr capsule, Take 1 capsule (10 mg) by mouth daily, Disp: 30 capsule, Rfl: 0     amphetamine-dextroamphetamine (ADDERALL XR) 20 MG 24 hr capsule, Take 1 capsule (20 mg) by mouth daily, Disp: 30 capsule, Rfl: 0     escitalopram (LEXAPRO) 10 MG tablet, TAKE 1  TABLET BY MOUTH EVERY DAY, Disp: 90 tablet, Rfl: 0     escitalopram (LEXAPRO) 20 MG tablet, TAKE 1 TABLET(20 MG) BY MOUTH DAILY, Disp: 90 tablet, Rfl: 1     levonorgestrel (MIRENA) 20 MCG/24HR IUD, 1 each (20 mcg) by Intrauterine route continuous, Disp: , Rfl:     REVIEW OF SYSTEMS  As per HPI otherwise negative.    The patient's Medical Hx, Surgical Hx, Obstetrical Hx, Social Hx, and Family Hx have been reviewed and updated in the electronic medical record.    OBJECTIVE  /74   Pulse 70   Temp 97.4  F (36.3  C)   Resp 16   Ht 1.524 m (5')   Wt 67.1 kg (148 lb)   BMI 28.90 kg/m      General:  Well-developed, well-nourished female in no apparent distress.  Neurological: Alert and oriented x3.  Lungs:  Clear to auscultation bilaterally with good inspiratory effort.  No wheezing rhonchi or rales noted. Breathing nonlabored.  Heart:  Regular rate and rhythm without murmur. No JVD.  No peripheral vascular disease.  Abdomen: Soft, nontender, nondistended, positive bowel sounds.  No organomegaly. No rebound, no guarding.  Pelvic exam:  Normal external female genitalia without lesions or abnormalities. Normal pubic hair distribution. Urethral meatus normal in appearance and without masses. Normal Bartholin, Urethral and [place]'s glands. Vaginal mucosa is pink and moist with a small amount of physiologic discharge present in the posterior vaginal fornix. No vaginal lesions.  Normal multiparous cervix without lesions or abnormalities. No cervical motion tenderness to palpation. IUD strings are visible. The bladder and urethra are nontender to palpation. Uterus is normal size, shape, consistency, anteflexed, mobile, and nontender. Uterine descent is minimal.  No adnexal masses or tenderness bilaterally.  Rectal exam:  No external hemorrhoids noted. No rectovaginal nodularity noted. Examination confirms the vaginal exam.  Extremities:  No clubbing cyanosis or edema. Nontender bilaterally.     Chaperone: Betzy Macias  LPN    DIAGNOSTICS  2019 Pap NIL  2023 Pap pending    PROCEDURE:  A Mirena IUD was removed. Then a new Mirena IUD was inserted/ replaced. Please see separate note for details.    ASSESSMENT / PLAN  Valeria Thibodeaux is a 27 year old  female who presents requesting IUD replacement for long-term contraception.    1 Contraception counseling  - The patient is currently using Mirena IUD for contraception. She requests IUD replacement.  - I discussed contraception options available to the patient including oral contraceptive pills both continuous and cyclic, Ortho Evra patch, NuvaRing, Depo-Provera injections, Nexplanon, Mirena IUD, ParaGard IUD, Tiffanie IUD, and condoms.  I reviewed the risks, benefits, alternatives, indications and side effects of each of these contraception options with the patient.  - The patient requests a new Mirena IUD for contraception.  - The risks, benefits, alternative and indications of intrauterine device (IUD) were discussed with the patient. I discussed risk of bleeding and postcoital spotting with an IUD. I discussed the risk of amenorrhea with the Mirena IUD.  I discussed increased risk of intrauterine infection and sexually transmitted disease with an IUD.  I discussed the risk of pain and cramping, uterine perforation and dyspareunia. I discussed the risk of IUD expulsion. I discussed that the IUD does not protect against sexually transmitted disease and that condom usage is recommended for STD prevention. I discussed the risk that pregnancy can occur with the IUD in place and to alert us if she has symptoms of pregnancy and a positive pregnancy test. I discussed the increased risk of ectopic pregnancy with an IUD. The patient verbalizes understanding and desires to proceed.  - I recommend the patient remain abstinent for at least 7 days to allow proper healing after IUD placement.  - The patient should follow back up in clinic if she develops any problems after IUD  placement.  - I reviewed IUD string checks with the patient.  - I reviewed with the patient that the Mirena IUD is effective for 5 years and will need to be removed or replaced 5 years from the insertion date.  - I reviewed sexually transmitted disease precautions with the patient.  - All the patient's questions were answered.    2 IUD replacement  - I discussed expected outcome, risk, benefits, alternatives, indication of IUD removal and replacement with a new IUD device.  The patient verbalizes understanding and desires to proceed.  - The consent form was reviewed and signed.  - A Mirena IUD was removed and a new Mirena IUD was inserted/ replaced as mentioned above without difficulty. The patient tolerated the procedure well.  - Please see separate note for details.    3 Cervix screening  - Pap smear obtained.    - Problem list reviewed and updated.  - Follow up annually or sooner as needed.    Justin Bell MD  Obstetrics and Gynecology

## 2023-02-23 NOTE — PROCEDURES
GYN Procedure Note     PROCEDURE PERFORMED  IUD Removal and Insertion of new IUD  IUD Type: Mirena    INDICATION  1 IUD removal and insertion of new IUD.  2 Patient desires long term reversible contraception..    OBJECTIVE  /74   Pulse 70   Temp 97.4  F (36.3  C)   Resp 16   Ht 1.524 m (5')   Wt 67.1 kg (148 lb)   BMI 28.90 kg/m      Please see separate note for details of physical examination.    PROCEDURE  I reviewed the risks, benefits, alternatives, indication and expected outcome of IUD removal and insertion of new IUD with the patient. The patient verbalized understanding and desired to proceed. Consent form was signed.  After informed consent was obtained from the patient, the patient was prepped and draped in the usual fashion. A speculum was placed in the vagina to visualize the cervix. The cervix was then swabbed with betadine. The IUD strings are visible. A Ring forceps were used to grasp the IUD strings and with gentle traction, the Mirena IUD was removed. This was inspected and found to be intact. The Mirena IUD was shown to the patient.    The cervix was then swabbed with betadine again. A single tooth tenaculum was applied to the anterior cervical lip. An endometrial pipelle was passed through the cervical os into the uterine cavity and the uterus sounded to 6 cm. The Mirena device was opened, inspected and found to be intact. The Mirena insertion device was set to the 6 cm kenji. The introduction catheter was passed through the cervical os into the endometrial cavity and the Mirena IUD was deployed at the 6 cm kenji in the usual fashion. The IUD introduction catheter was removed. The IUD strings were trimmed to 3 cm length. The tenaculum was removed from the cervix and the cervix was hemostatic.  All instruments were removed from the vagina. There were no complications. The patient tolerated the procedure well with a minimal amount of spotting and cramping noted.    Chaperone: Betzy Macias  LPN    Justin Bell MD  Obstetrics and Gynecology

## 2023-02-28 LAB
BKR LAB AP GYN ADEQUACY: NORMAL
BKR LAB AP GYN INTERPRETATION: NORMAL
BKR LAB AP HPV REFLEX: NORMAL
BKR LAB AP PREVIOUS ABNORMAL: NORMAL
PATH REPORT.COMMENTS IMP SPEC: NORMAL
PATH REPORT.COMMENTS IMP SPEC: NORMAL
PATH REPORT.RELEVANT HX SPEC: NORMAL

## 2023-03-16 ENCOUNTER — MYC REFILL (OUTPATIENT)
Dept: FAMILY MEDICINE | Facility: OTHER | Age: 28
End: 2023-03-16

## 2023-03-16 DIAGNOSIS — F41.9 ANXIETY: ICD-10-CM

## 2023-03-16 DIAGNOSIS — F90.0 ATTENTION DEFICIT HYPERACTIVITY DISORDER (ADHD), PREDOMINANTLY INATTENTIVE TYPE: ICD-10-CM

## 2023-03-16 NOTE — TELEPHONE ENCOUNTER
Escitalopram (Lexapro) 10 MG tablet     Last Written Prescription Date:  12/16/2022  Last Fill Quantity: 90,   # refills: 0  Last Office Visit: 02/06/2023

## 2023-03-17 ENCOUNTER — MYC REFILL (OUTPATIENT)
Dept: FAMILY MEDICINE | Facility: OTHER | Age: 28
End: 2023-03-17

## 2023-03-17 DIAGNOSIS — F90.0 ATTENTION DEFICIT HYPERACTIVITY DISORDER (ADHD), PREDOMINANTLY INATTENTIVE TYPE: ICD-10-CM

## 2023-03-17 RX ORDER — ESCITALOPRAM OXALATE 10 MG/1
TABLET ORAL
Qty: 90 TABLET | Refills: 1 | Status: SHIPPED | OUTPATIENT
Start: 2023-03-17 | End: 2023-09-20

## 2023-03-17 NOTE — TELEPHONE ENCOUNTER
amphetamine-dextroamphetamine (ADDERALL XR) 10 MG 24 hr capsule      Last Written Prescription Date:  2/17/2023  Last Fill Quantity: 30,   # refills: 0  Last Office Visit: 2/06/2023        Routing refill request to provider for review/approval because:  Drug not on the [facility], P or  Health refill protocol or controlled substance    amphetamine-dextroamphetamine (ADDERALL XR) 20 MG 24 hr capsule      Last Written Prescription Date:  2/17/2023  Last Fill Quantity: 30,   # refills: 0  Last Office Visit: 2/06/2023  Routing refill request to provider for review/approval because:  Drug not on the [facility], P or  Health refill protocol or controlled substance

## 2023-03-20 RX ORDER — DEXTROAMPHETAMINE SACCHARATE, AMPHETAMINE ASPARTATE MONOHYDRATE, DEXTROAMPHETAMINE SULFATE AND AMPHETAMINE SULFATE 2.5; 2.5; 2.5; 2.5 MG/1; MG/1; MG/1; MG/1
10 CAPSULE, EXTENDED RELEASE ORAL DAILY
Qty: 30 CAPSULE | Refills: 0 | Status: SHIPPED | OUTPATIENT
Start: 2023-03-20 | End: 2023-04-19

## 2023-03-20 RX ORDER — DEXTROAMPHETAMINE SACCHARATE, AMPHETAMINE ASPARTATE MONOHYDRATE, DEXTROAMPHETAMINE SULFATE AND AMPHETAMINE SULFATE 5; 5; 5; 5 MG/1; MG/1; MG/1; MG/1
20 CAPSULE, EXTENDED RELEASE ORAL DAILY
Qty: 30 CAPSULE | Refills: 0 | Status: SHIPPED | OUTPATIENT
Start: 2023-03-20 | End: 2023-04-19

## 2023-03-21 NOTE — TELEPHONE ENCOUNTER
amphetamine-dextroamphetamine (ADDERALL XR) 10 MG 24 hr capsule  amphetamine-dextroamphetamine (ADDERALL XR) 20 MG 24 hr capsule    Last Written Prescription Date:  3-20-23  Last Fill Quantity: 30,   # refills: 0  Last Office Visit:   Future Office visit:       Routing refill request to provider for review/approval because:  TWO MEDICAITONS JUST FILLED

## 2023-03-22 RX ORDER — DEXTROAMPHETAMINE SACCHARATE, AMPHETAMINE ASPARTATE MONOHYDRATE, DEXTROAMPHETAMINE SULFATE AND AMPHETAMINE SULFATE 2.5; 2.5; 2.5; 2.5 MG/1; MG/1; MG/1; MG/1
10 CAPSULE, EXTENDED RELEASE ORAL DAILY
Qty: 30 CAPSULE | Refills: 0 | OUTPATIENT
Start: 2023-03-22

## 2023-03-22 RX ORDER — DEXTROAMPHETAMINE SACCHARATE, AMPHETAMINE ASPARTATE MONOHYDRATE, DEXTROAMPHETAMINE SULFATE AND AMPHETAMINE SULFATE 5; 5; 5; 5 MG/1; MG/1; MG/1; MG/1
20 CAPSULE, EXTENDED RELEASE ORAL DAILY
Qty: 30 CAPSULE | Refills: 0 | OUTPATIENT
Start: 2023-03-22

## 2023-04-18 DIAGNOSIS — F41.9 ANXIETY: ICD-10-CM

## 2023-04-19 ENCOUNTER — MYC REFILL (OUTPATIENT)
Dept: FAMILY MEDICINE | Facility: OTHER | Age: 28
End: 2023-04-19

## 2023-04-19 DIAGNOSIS — F90.0 ATTENTION DEFICIT HYPERACTIVITY DISORDER (ADHD), PREDOMINANTLY INATTENTIVE TYPE: ICD-10-CM

## 2023-04-19 RX ORDER — ESCITALOPRAM OXALATE 20 MG/1
TABLET ORAL
Qty: 90 TABLET | Refills: 1 | Status: SHIPPED | OUTPATIENT
Start: 2023-04-19 | End: 2023-09-20

## 2023-04-20 ENCOUNTER — MYC REFILL (OUTPATIENT)
Dept: FAMILY MEDICINE | Facility: OTHER | Age: 28
End: 2023-04-20

## 2023-04-20 DIAGNOSIS — F90.0 ATTENTION DEFICIT HYPERACTIVITY DISORDER (ADHD), PREDOMINANTLY INATTENTIVE TYPE: ICD-10-CM

## 2023-04-20 RX ORDER — DEXTROAMPHETAMINE SACCHARATE, AMPHETAMINE ASPARTATE MONOHYDRATE, DEXTROAMPHETAMINE SULFATE AND AMPHETAMINE SULFATE 2.5; 2.5; 2.5; 2.5 MG/1; MG/1; MG/1; MG/1
10 CAPSULE, EXTENDED RELEASE ORAL DAILY
Qty: 30 CAPSULE | Refills: 0 | Status: CANCELLED | OUTPATIENT
Start: 2023-04-20

## 2023-04-20 RX ORDER — DEXTROAMPHETAMINE SACCHARATE, AMPHETAMINE ASPARTATE MONOHYDRATE, DEXTROAMPHETAMINE SULFATE AND AMPHETAMINE SULFATE 5; 5; 5; 5 MG/1; MG/1; MG/1; MG/1
20 CAPSULE, EXTENDED RELEASE ORAL DAILY
Qty: 30 CAPSULE | Refills: 0 | Status: SHIPPED | OUTPATIENT
Start: 2023-04-20 | End: 2023-05-22

## 2023-04-20 RX ORDER — DEXTROAMPHETAMINE SACCHARATE, AMPHETAMINE ASPARTATE MONOHYDRATE, DEXTROAMPHETAMINE SULFATE AND AMPHETAMINE SULFATE 5; 5; 5; 5 MG/1; MG/1; MG/1; MG/1
20 CAPSULE, EXTENDED RELEASE ORAL DAILY
Qty: 30 CAPSULE | Refills: 0 | Status: CANCELLED | OUTPATIENT
Start: 2023-04-20

## 2023-04-20 RX ORDER — DEXTROAMPHETAMINE SACCHARATE, AMPHETAMINE ASPARTATE MONOHYDRATE, DEXTROAMPHETAMINE SULFATE AND AMPHETAMINE SULFATE 2.5; 2.5; 2.5; 2.5 MG/1; MG/1; MG/1; MG/1
10 CAPSULE, EXTENDED RELEASE ORAL DAILY
Qty: 30 CAPSULE | Refills: 0 | Status: SHIPPED | OUTPATIENT
Start: 2023-04-20 | End: 2023-05-22

## 2023-04-20 NOTE — TELEPHONE ENCOUNTER
amphetamine-dextroamphetamine (ADDERALL XR) 10 MG 24 hr capsule 30 capsule 0 3/20/2023     amphetamine-dextroamphetamine (ADDERALL XR) 20 MG 24 hr capsule 30 capsule 0 3/20/2023       Last Office Visit: 02/06/23  Future Office visit:       Routing refill request to provider for review/approval because:

## 2023-05-22 ENCOUNTER — MYC REFILL (OUTPATIENT)
Dept: FAMILY MEDICINE | Facility: OTHER | Age: 28
End: 2023-05-22

## 2023-05-22 DIAGNOSIS — F90.0 ATTENTION DEFICIT HYPERACTIVITY DISORDER (ADHD), PREDOMINANTLY INATTENTIVE TYPE: ICD-10-CM

## 2023-05-24 RX ORDER — DEXTROAMPHETAMINE SACCHARATE, AMPHETAMINE ASPARTATE MONOHYDRATE, DEXTROAMPHETAMINE SULFATE AND AMPHETAMINE SULFATE 5; 5; 5; 5 MG/1; MG/1; MG/1; MG/1
20 CAPSULE, EXTENDED RELEASE ORAL DAILY
Qty: 30 CAPSULE | Refills: 0 | Status: SHIPPED | OUTPATIENT
Start: 2023-05-24 | End: 2023-06-19

## 2023-05-24 RX ORDER — DEXTROAMPHETAMINE SACCHARATE, AMPHETAMINE ASPARTATE MONOHYDRATE, DEXTROAMPHETAMINE SULFATE AND AMPHETAMINE SULFATE 2.5; 2.5; 2.5; 2.5 MG/1; MG/1; MG/1; MG/1
10 CAPSULE, EXTENDED RELEASE ORAL DAILY
Qty: 30 CAPSULE | Refills: 0 | Status: SHIPPED | OUTPATIENT
Start: 2023-05-24 | End: 2023-06-19

## 2023-05-24 NOTE — TELEPHONE ENCOUNTER
Adderall, Adderall      Last Written Prescription Date:  4.20.23  Last Fill Quantity: #30, #30,   # refills: 0  Last Office Visit: 2.6.23  Future Office visit:       Routing refill request to provider for review/approval because:  Drug not on the [facility], [facility] or [facility] refill protocol or controlled substance

## 2023-06-19 ENCOUNTER — MYC REFILL (OUTPATIENT)
Dept: FAMILY MEDICINE | Facility: OTHER | Age: 28
End: 2023-06-19

## 2023-06-19 DIAGNOSIS — F90.0 ATTENTION DEFICIT HYPERACTIVITY DISORDER (ADHD), PREDOMINANTLY INATTENTIVE TYPE: ICD-10-CM

## 2023-06-22 RX ORDER — DEXTROAMPHETAMINE SACCHARATE, AMPHETAMINE ASPARTATE MONOHYDRATE, DEXTROAMPHETAMINE SULFATE AND AMPHETAMINE SULFATE 5; 5; 5; 5 MG/1; MG/1; MG/1; MG/1
20 CAPSULE, EXTENDED RELEASE ORAL DAILY
Qty: 30 CAPSULE | Refills: 0 | Status: SHIPPED | OUTPATIENT
Start: 2023-06-22 | End: 2023-07-19

## 2023-06-22 RX ORDER — DEXTROAMPHETAMINE SACCHARATE, AMPHETAMINE ASPARTATE MONOHYDRATE, DEXTROAMPHETAMINE SULFATE AND AMPHETAMINE SULFATE 2.5; 2.5; 2.5; 2.5 MG/1; MG/1; MG/1; MG/1
10 CAPSULE, EXTENDED RELEASE ORAL DAILY
Qty: 30 CAPSULE | Refills: 0 | Status: SHIPPED | OUTPATIENT
Start: 2023-06-22 | End: 2023-07-19

## 2023-07-19 ENCOUNTER — MYC REFILL (OUTPATIENT)
Dept: FAMILY MEDICINE | Facility: OTHER | Age: 28
End: 2023-07-19

## 2023-07-19 DIAGNOSIS — F90.0 ATTENTION DEFICIT HYPERACTIVITY DISORDER (ADHD), PREDOMINANTLY INATTENTIVE TYPE: ICD-10-CM

## 2023-07-21 RX ORDER — DEXTROAMPHETAMINE SACCHARATE, AMPHETAMINE ASPARTATE MONOHYDRATE, DEXTROAMPHETAMINE SULFATE AND AMPHETAMINE SULFATE 5; 5; 5; 5 MG/1; MG/1; MG/1; MG/1
20 CAPSULE, EXTENDED RELEASE ORAL DAILY
Qty: 30 CAPSULE | Refills: 0 | Status: SHIPPED | OUTPATIENT
Start: 2023-07-21 | End: 2023-07-31

## 2023-07-21 RX ORDER — DEXTROAMPHETAMINE SACCHARATE, AMPHETAMINE ASPARTATE MONOHYDRATE, DEXTROAMPHETAMINE SULFATE AND AMPHETAMINE SULFATE 2.5; 2.5; 2.5; 2.5 MG/1; MG/1; MG/1; MG/1
10 CAPSULE, EXTENDED RELEASE ORAL DAILY
Qty: 30 CAPSULE | Refills: 0 | Status: SHIPPED | OUTPATIENT
Start: 2023-07-21 | End: 2023-07-31

## 2023-07-25 ENCOUNTER — TELEPHONE (OUTPATIENT)
Dept: FAMILY MEDICINE | Facility: OTHER | Age: 28
End: 2023-07-25

## 2023-07-25 NOTE — TELEPHONE ENCOUNTER
9:53 AM    Reason for Call: Phone Call    Description: patient would like her prescriptions refilled.  She is completely out of meds.     Was an appointment offered for this call? No  If yes : Appointment type              Date    Preferred method for responding to this message: Telephone Call  What is your phone number ? [phone]    If we cannot reach you directly, may we leave a detailed response at the number you provided? Yes    Can this message wait until your PCP/provider returns, if available today? YES, No provider is in today    JANE GOULD

## 2023-07-25 NOTE — TELEPHONE ENCOUNTER
It looks like the adderall was filled on 7/21/23 by Cee Hanson [facility]  [phone]

## 2023-07-25 NOTE — TELEPHONE ENCOUNTER
Pt will call around and find out what pharmacy has adderall and notify us so we can send it there.  [facility] is out.

## 2023-07-27 DIAGNOSIS — F90.0 ATTENTION DEFICIT HYPERACTIVITY DISORDER (ADHD), PREDOMINANTLY INATTENTIVE TYPE: Primary | ICD-10-CM

## 2023-07-27 NOTE — TELEPHONE ENCOUNTER
Patient states the adderal XR is out of stock at the pharmacy.  Please send over the reg adderall.    amphetamine-dextroamphetamine (ADDERALL XR) 20 MG 24 hr capsule       Last Written Prescription Date:  7/21/23  Last Fill Quantity: 30,   # refills: 0  Last Office Visit: 2/6/23  Future Office visit:    Next 5 appointments (look out 90 days)      Sep 06, 2023  3:45 PM  (Arrive by 3:30 PM)  SHORT with Rosa Hanson CNP  [facility] ([facility] ) [address]  [place] [address]  [phone]             Routing refill request to provider for review/approval because:      amphetamine-dextroamphetamine (ADDERALL XR) 10 MG 24 hr capsule       Last Written Prescription Date:  7/21/23  Last Fill Quantity: 30,   # refills: 0  Last Office Visit: 2/6/23  Future Office visit:    Next 5 appointments (look out 90 days)      Sep 06, 2023  3:45 PM  (Arrive by 3:30 PM)  SHORT with Rosa Hanson CNP  [facility] ([facility] ) [address]  [place] [address]  [phone]             Routing refill request to provider for review/approval because:

## 2023-07-27 NOTE — TELEPHONE ENCOUNTER
Are there any other pharmacies that have the XR in stock?  I don't just want to change to the immediate release

## 2023-07-28 ENCOUNTER — TELEPHONE (OUTPATIENT)
Dept: FAMILY MEDICINE | Facility: OTHER | Age: 28
End: 2023-07-28

## 2023-07-28 NOTE — TELEPHONE ENCOUNTER
Patient calling back to see if this previous request has been addressed.      Patient was kind and  understanding that give it is now Friday after hours this will most likely not be addressed until Monday. She has been out of the Adderall for the past 4 days.  She knows that the [facility] in [facility] has the immediate release in 10mg and 20mg and is wondering if she could be changed IR    -patient is requesting a call back at [phone] once this need has been addressed    MARTY DAVIS RN on 7/28/2023 at 5:26 PM

## 2023-07-28 NOTE — TELEPHONE ENCOUNTER
9:50 AM    Reason for Call: Phone Call    Description: Patient called in stating that she has been out of 10 mg xr and 20 mg xr Adderall and is wondering if she could switch to the 30 mg xr Adderall if [facility] has them in stock otherwise she would like the 10 and 20 mg immediate release as pharmacies are out of the XR. Patient has called the medication line. Please call patient back.     Was an appointment offered for this call? No  If yes : Appointment type              Date    Preferred method for responding to this message: Telephone Call  What is your phone number ? [phone]     If we cannot reach you directly, may we leave a detailed response at the number you provided? Yes    Can this message wait until your PCP/provider returns, if available today? Minoo Anderson

## 2023-07-31 ENCOUNTER — TELEPHONE (OUTPATIENT)
Dept: FAMILY MEDICINE | Facility: OTHER | Age: 28
End: 2023-07-31

## 2023-07-31 RX ORDER — DEXTROAMPHETAMINE SACCHARATE, AMPHETAMINE ASPARTATE MONOHYDRATE, DEXTROAMPHETAMINE SULFATE AND AMPHETAMINE SULFATE 5; 5; 5; 5 MG/1; MG/1; MG/1; MG/1
20 CAPSULE, EXTENDED RELEASE ORAL DAILY
Qty: 30 CAPSULE | Refills: 0 | Status: SHIPPED | OUTPATIENT
Start: 2023-07-31 | End: 2023-07-31

## 2023-07-31 RX ORDER — DEXTROAMPHETAMINE SACCHARATE, AMPHETAMINE ASPARTATE MONOHYDRATE, DEXTROAMPHETAMINE SULFATE AND AMPHETAMINE SULFATE 2.5; 2.5; 2.5; 2.5 MG/1; MG/1; MG/1; MG/1
10 CAPSULE, EXTENDED RELEASE ORAL DAILY
Qty: 30 CAPSULE | Refills: 0 | Status: SHIPPED | OUTPATIENT
Start: 2023-07-31 | End: 2023-07-31

## 2023-07-31 RX ORDER — DEXTROAMPHETAMINE SACCHARATE, AMPHETAMINE ASPARTATE MONOHYDRATE, DEXTROAMPHETAMINE SULFATE AND AMPHETAMINE SULFATE 5; 5; 5; 5 MG/1; MG/1; MG/1; MG/1
20 CAPSULE, EXTENDED RELEASE ORAL DAILY
Qty: 30 CAPSULE | Refills: 0 | Status: SHIPPED | OUTPATIENT
Start: 2023-07-31 | End: 2023-08-25

## 2023-07-31 RX ORDER — DEXTROAMPHETAMINE SACCHARATE, AMPHETAMINE ASPARTATE MONOHYDRATE, DEXTROAMPHETAMINE SULFATE AND AMPHETAMINE SULFATE 2.5; 2.5; 2.5; 2.5 MG/1; MG/1; MG/1; MG/1
10 CAPSULE, EXTENDED RELEASE ORAL DAILY
Qty: 30 CAPSULE | Refills: 0 | Status: SHIPPED | OUTPATIENT
Start: 2023-07-31 | End: 2023-08-25

## 2023-07-31 RX ORDER — DEXTROAMPHETAMINE SACCHARATE, AMPHETAMINE ASPARTATE, DEXTROAMPHETAMINE SULFATE AND AMPHETAMINE SULFATE 2.5; 2.5; 2.5; 2.5 MG/1; MG/1; MG/1; MG/1
10 TABLET ORAL 2 TIMES DAILY
Qty: 60 TABLET | Refills: 0 | Status: CANCELLED | OUTPATIENT
Start: 2023-07-31

## 2023-07-31 RX ORDER — DEXTROAMPHETAMINE SACCHARATE, AMPHETAMINE ASPARTATE, DEXTROAMPHETAMINE SULFATE AND AMPHETAMINE SULFATE 5; 5; 5; 5 MG/1; MG/1; MG/1; MG/1
20 TABLET ORAL 2 TIMES DAILY
Qty: 60 TABLET | Refills: 0 | Status: CANCELLED | OUTPATIENT
Start: 2023-07-31

## 2023-07-31 NOTE — TELEPHONE ENCOUNTER
Writer verified that [facility] can supply original scripts for both Adderall XR 10 mg & 20 mg  Verified with patient that she is able to pick-up at this pharmacy    Patient has been out of medication past few days  PCP is out of the office    Pended to covering Provider to review

## 2023-07-31 NOTE — TELEPHONE ENCOUNTER
Reason for call:  Medication    Medication Name? Adderal xr 20mg and 10mg   Is this request for a refill? Yes   What Pharmacy do you use? [facility] pharmacy  Have you contacted your pharmacy? Yes    If yes, when? today  (Please note that the turn-around-time for prescriptions is 72 business hours; I am sending your request at this time. SEND TO  [facility]  )  Description: sent to [facility] is out needs to be sent to Brian in the clinic  Was an appointment offered for this a call? No   Preferred method for responding to this messageTelephone Call  If we cannot reach you directly, may we leave a detailed response at the number you provided? Yes  Can this message wait until your PCP/Provider returns if not available today? No

## 2023-07-31 NOTE — TELEPHONE ENCOUNTER
Patient calling back and states XR is out of stock at all local pharmacies.   Patient requesting immediate release which is in stock.  Please advise, thank you.

## 2023-08-02 NOTE — PROGRESS NOTES
Assessment & Plan       Anxiety  - Continue plan of care    Attention deficit hyperactivity disorder (ADHD), predominantly inattentive type  - Continue plan of care      Cerumen impaction  - Ear wash  - Debrox at home      Return in about 6 months (around 2024).        Rosa Hanson, CNP  [facility] is a 28 year old, presenting for the following health issues:  Anxiety and A.D.H.D        Anxiety Follow-Up  How are you doing with your anxiety since your last visit? Worsened due to not having mediation for over a week due to shortage   Are you having other symptoms that might be associated with anxiety? No  Have you had a significant life event? OTHER: moved to [place]     Are you feeling depressed? No  Do you have any concerns with your use of alcohol or other drugs? No        Right ear feels blocked  Difficulty hearing        Social History     Tobacco Use    Smoking status: Former     Years: 5.00     Types: Cigarettes     Quit date:      Years since quittin.6    Smokeless tobacco: Never   Vaping Use    Vaping Use: Never used   Substance Use Topics    Alcohol use: Yes     Comment: occasionally    Drug use: No             2022     1:00 PM 2023     3:00 PM 2023    12:52 PM   BETSY-7 SCORE   Total Score   9 (mild anxiety)   Total Score 2 7 9             2022     1:00 PM 2023     3:00 PM 2023    12:51 PM   PHQ   PHQ-9 Total Score 4 3 12   Q9: Thoughts of better off dead/self-harm past 2 weeks Not at all Not at all Not at all             2023    12:51 PM   Last PHQ-9   1.  Little interest or pleasure in doing things 1   2.  Feeling down, depressed, or hopeless 1   3.  Trouble falling or staying asleep, or sleeping too much 2   4.  Feeling tired or having little energy 2   5.  Poor appetite or overeating 1   6.  Feeling bad about yourself 1   7.  Trouble concentrating 3   8.  Moving slowly or restless 1   Q9: Thoughts of better off  dead/self-harm past 2 weeks 0   PHQ-9 Total Score 12             2023    12:52 PM   BETSY-7    1. Feeling nervous, anxious, or on edge 2   2. Not being able to stop or control worrying 1   3. Worrying too much about different things 2   4. Trouble relaxing 1   5. Being so restless that it is hard to sit still 1   6. Becoming easily annoyed or irritable 1   7. Feeling afraid, as if something awful might happen 1   BETSY-7 Total Score 9   If you checked any problems, how difficult have they made it for you to do your work, take care of things at home, or get along with other people? Very difficult           ADHD  Onset: 7 year(s) ago   Description:   Easily distracted: YES  Short attention span: YES- at times  Trouble following directions: No   Impulsive behavior: No   Trouble completing tasks: No  Accompanying Signs & Symptoms:        Change in sleep pattern: yes due to not having medications   Irritability at certain times of the day: YES  Socially withdrawn: No  Depression symptoms: No  Anxiety symptoms: YES  History:  Caffeine intake: Moderate  Loss of appetite: No  Healthy diet: YES  Did you have problems in school or with previous employment: YES- hard time focusing   Family history of ADHD: No  Have you had an evaluation for ADHD in the past: YES  Do you use alcohol or drugs: No  Therapies tried: Adderall (concerta) with total relief           Patient Active Problem List   Diagnosis    ADHD (attention deficit hyperactivity disorder)    Anxiety     Past Surgical History:   Procedure Laterality Date     SECTION N/A 10/15/2017    Procedure:  SECTION;   Section;  Surgeon: Ramez Marquez MD;  Location: [facility]    ORTHOPEDIC SURGERY Right 2018    rt wrist , ganglion cyst removed    TEAR DUCT SURGERY Left     blocked tear duct - left    TONSILLECTOMY      tonsillitis       Social History     Tobacco Use    Smoking status: Former     Years: 5.00     Types: Cigarettes     Quit date:  2016     Years since quittin.6    Smokeless tobacco: Never   Substance Use Topics    Alcohol use: Yes     Comment: occasionally     Family History   Problem Relation Age of Onset    Diabetes Maternal Grandfather     Heart Disease Maternal Grandfather         heart disease    Myocardial Infarction Paternal Aunt     Myocardial Infarction Paternal Grandfather         cause of death    Coronary Artery Disease Paternal Grandfather     Myocardial Infarction Paternal Uncle     Coronary Artery Disease Paternal Uncle     Coronary Artery Disease Paternal Aunt            Current Outpatient Medications   Medication Sig Dispense Refill    amphetamine-dextroamphetamine (ADDERALL XR) 10 MG 24 hr capsule Take 1 capsule (10 mg) by mouth daily 30 capsule 0    amphetamine-dextroamphetamine (ADDERALL XR) 20 MG 24 hr capsule Take 1 capsule (20 mg) by mouth daily 30 capsule 0    escitalopram (LEXAPRO) 10 MG tablet TAKE 1 TABLET BY MOUTH EVERY DAY 90 tablet 1    escitalopram (LEXAPRO) 20 MG tablet TAKE 1 TABLET(20 MG) BY MOUTH DAILY 90 tablet 1         Allergies   Allergen Reactions    Bacitracin      Neosporin-Pt. Had eye swelling as a baby, Pt. States not even sure if she still has an allergy to neosporin    Bacitracin Zinc      Neosporin    Gramicidin D      Neosporin    Neomycin Sulfate      Neosporin    Polymyxin B      Neosporin    Polymyxin B Sulfate      Neosporin         Recent Labs   Lab Test 22  1350 08/10/18  1018 18  1507 10/16/17  0523 10/15/17  1102 10/14/17  1841 17  1050   A1C  --   --   --   --   --   --  5.6   ALT  --   --   --   --   --  11  --    CR  --  0.79  --  0.85   < > 1.11*  --    GFRESTIMATED  --  88  --  82   < > 60*  --    GFRESTBLACK  --  >90  --  >90   < > 73  --    POTASSIUM  --  4.0  --  4.1   < > 4.5  --    TSH 0.54  --  1.03  --   --   --   --     < > = values in this interval not displayed.          BP Readings from Last 3 Encounters:   23 110/76   23 108/74    02/06/23 118/80    Wt Readings from Last 3 Encounters:   08/07/23 64.5 kg (142 lb 1.6 oz)   02/23/23 67.1 kg (148 lb)   08/02/22 68.1 kg (150 lb 3.2 oz)              Review of Systems   Constitutional, HEENT, cardiovascular, pulmonary, GI, , musculoskeletal, neuro, skin, endocrine and psych systems are negative, except as otherwise noted.            Objective    /76 (BP Location: Right arm, Patient Position: Chair, Cuff Size: Adult Regular)   Pulse 94   Temp 98.3  F (36.8  C) (Tympanic)   Resp 16   Ht 1.524 m (5')   Wt 64.5 kg (142 lb 1.6 oz)   SpO2 98%   BMI 27.75 kg/m    Body mass index is 27.75 kg/m .          Physical Exam   GENERAL: healthy, alert and no distress  NECK: no adenopathy, no asymmetry, masses, or scars and thyroid normal to palpation.  Right ear canal is occluded by cerumen - ear wash ordered  RESP: lungs clear to auscultation - no rales, rhonchi or wheezes  CV: regular rate and rhythm, normal S1 S2, no S3 or S4, no murmur, click or rub, no peripheral edema and peripheral pulses strong  PSYCH: mentation appears normal, affect normal/bright

## 2023-08-07 ENCOUNTER — OFFICE VISIT (OUTPATIENT)
Dept: FAMILY MEDICINE | Facility: OTHER | Age: 28
End: 2023-08-07
Attending: NURSE PRACTITIONER
Payer: COMMERCIAL

## 2023-08-07 VITALS
HEIGHT: 60 IN | OXYGEN SATURATION: 98 % | TEMPERATURE: 98.3 F | WEIGHT: 142.1 LBS | DIASTOLIC BLOOD PRESSURE: 76 MMHG | RESPIRATION RATE: 16 BRPM | SYSTOLIC BLOOD PRESSURE: 110 MMHG | HEART RATE: 94 BPM | BODY MASS INDEX: 27.9 KG/M2

## 2023-08-07 DIAGNOSIS — H61.21 IMPACTED CERUMEN OF RIGHT EAR: ICD-10-CM

## 2023-08-07 DIAGNOSIS — F41.9 ANXIETY: Primary | ICD-10-CM

## 2023-08-07 DIAGNOSIS — F90.0 ATTENTION DEFICIT HYPERACTIVITY DISORDER (ADHD), PREDOMINANTLY INATTENTIVE TYPE: ICD-10-CM

## 2023-08-07 PROCEDURE — 99213 OFFICE O/P EST LOW 20 MIN: CPT | Performed by: NURSE PRACTITIONER

## 2023-08-07 PROCEDURE — G0463 HOSPITAL OUTPT CLINIC VISIT: HCPCS

## 2023-08-07 ASSESSMENT — ANXIETY QUESTIONNAIRES
2. NOT BEING ABLE TO STOP OR CONTROL WORRYING: SEVERAL DAYS
4. TROUBLE RELAXING: SEVERAL DAYS
GAD7 TOTAL SCORE: 9
7. FEELING AFRAID AS IF SOMETHING AWFUL MIGHT HAPPEN: SEVERAL DAYS
GAD7 TOTAL SCORE: 9
3. WORRYING TOO MUCH ABOUT DIFFERENT THINGS: MORE THAN HALF THE DAYS
1. FEELING NERVOUS, ANXIOUS, OR ON EDGE: MORE THAN HALF THE DAYS
IF YOU CHECKED OFF ANY PROBLEMS ON THIS QUESTIONNAIRE, HOW DIFFICULT HAVE THESE PROBLEMS MADE IT FOR YOU TO DO YOUR WORK, TAKE CARE OF THINGS AT HOME, OR GET ALONG WITH OTHER PEOPLE: VERY DIFFICULT
5. BEING SO RESTLESS THAT IT IS HARD TO SIT STILL: SEVERAL DAYS
6. BECOMING EASILY ANNOYED OR IRRITABLE: SEVERAL DAYS

## 2023-08-07 ASSESSMENT — PATIENT HEALTH QUESTIONNAIRE - PHQ9
SUM OF ALL RESPONSES TO PHQ QUESTIONS 1-9: 12
10. IF YOU CHECKED OFF ANY PROBLEMS, HOW DIFFICULT HAVE THESE PROBLEMS MADE IT FOR YOU TO DO YOUR WORK, TAKE CARE OF THINGS AT HOME, OR GET ALONG WITH OTHER PEOPLE: VERY DIFFICULT
SUM OF ALL RESPONSES TO PHQ QUESTIONS 1-9: 12

## 2023-08-07 ASSESSMENT — PAIN SCALES - GENERAL: PAINLEVEL: NO PAIN (0)

## 2023-08-07 NOTE — PATIENT INSTRUCTIONS
Assessment & Plan       Anxiety  - Continue plan of care    Attention deficit hyperactivity disorder (ADHD), predominantly inattentive type  - Continue plan of care        Return in about 6 months (around 2/7/2024).        Rosa Hanson CNP  [facility]

## 2023-09-19 DIAGNOSIS — F41.9 ANXIETY: ICD-10-CM

## 2023-09-21 RX ORDER — ESCITALOPRAM OXALATE 10 MG/1
TABLET ORAL
Qty: 90 TABLET | Refills: 1 | OUTPATIENT
Start: 2023-09-21

## 2023-10-14 DIAGNOSIS — F41.9 ANXIETY: ICD-10-CM

## 2023-10-16 NOTE — TELEPHONE ENCOUNTER
Lexapro      Last Written Prescription Date:  9/20/23  Last Fill Quantity: 90,   # refills: 1  Last Office Visit: 8/7/23  Future Office visit:       Routing refill request to provider for review/approval because:

## 2023-10-17 ENCOUNTER — MYC MEDICAL ADVICE (OUTPATIENT)
Dept: FAMILY MEDICINE | Facility: OTHER | Age: 28
End: 2023-10-17

## 2023-10-17 DIAGNOSIS — F41.9 ANXIETY: ICD-10-CM

## 2023-10-17 DIAGNOSIS — F90.0 ATTENTION DEFICIT HYPERACTIVITY DISORDER (ADHD), PREDOMINANTLY INATTENTIVE TYPE: ICD-10-CM

## 2023-10-17 RX ORDER — ESCITALOPRAM OXALATE 20 MG/1
20 TABLET ORAL DAILY
Qty: 90 TABLET | Refills: 2 | Status: SHIPPED | OUTPATIENT
Start: 2023-10-17 | End: 2023-10-18

## 2023-10-18 RX ORDER — DEXTROAMPHETAMINE SACCHARATE, AMPHETAMINE ASPARTATE MONOHYDRATE, DEXTROAMPHETAMINE SULFATE AND AMPHETAMINE SULFATE 5; 5; 5; 5 MG/1; MG/1; MG/1; MG/1
20 CAPSULE, EXTENDED RELEASE ORAL DAILY
Qty: 30 CAPSULE | Refills: 0 | Status: SHIPPED | OUTPATIENT
Start: 2023-10-18 | End: 2023-11-17

## 2023-10-18 RX ORDER — ESCITALOPRAM OXALATE 20 MG/1
20 TABLET ORAL DAILY
Qty: 90 TABLET | Refills: 2 | Status: SHIPPED | OUTPATIENT
Start: 2023-10-18 | End: 2024-04-01

## 2023-10-18 RX ORDER — DEXTROAMPHETAMINE SACCHARATE, AMPHETAMINE ASPARTATE MONOHYDRATE, DEXTROAMPHETAMINE SULFATE AND AMPHETAMINE SULFATE 2.5; 2.5; 2.5; 2.5 MG/1; MG/1; MG/1; MG/1
10 CAPSULE, EXTENDED RELEASE ORAL DAILY
Qty: 30 CAPSULE | Refills: 0 | Status: SHIPPED | OUTPATIENT
Start: 2023-10-18 | End: 2023-11-17

## 2023-10-18 NOTE — TELEPHONE ENCOUNTER
Ampheamine-dextroamphetamine 10 mg      Last Written Prescription Date:  09/20/2023  Last Fill Quantity: 30,   # refills: 0  Last Office Visit: 08/07/2023  Future Office visit:       Routing refill request to provider for review/approval because:  Drug not on the [facility], [facility] or [facility] refill protocol or controlled substance      Ampheamine-dextroamphetamine  20 mg      Last Written Prescription Date:  09/20/2023  Last Fill Quantity: 30,   # refills: 0    Routing refill request to provider for review/approval because:  Drug not on the [facility], [facility] or [facility] refill protocol or controlled substance        Per pt request Lexapro was sent to [facility].

## 2023-11-16 DIAGNOSIS — F90.0 ATTENTION DEFICIT HYPERACTIVITY DISORDER (ADHD), PREDOMINANTLY INATTENTIVE TYPE: ICD-10-CM

## 2023-11-17 RX ORDER — DEXTROAMPHETAMINE SACCHARATE, AMPHETAMINE ASPARTATE MONOHYDRATE, DEXTROAMPHETAMINE SULFATE AND AMPHETAMINE SULFATE 5; 5; 5; 5 MG/1; MG/1; MG/1; MG/1
20 CAPSULE, EXTENDED RELEASE ORAL DAILY
Qty: 30 CAPSULE | Refills: 0 | Status: SHIPPED | OUTPATIENT
Start: 2023-11-17 | End: 2023-12-14

## 2023-11-17 RX ORDER — DEXTROAMPHETAMINE SACCHARATE, AMPHETAMINE ASPARTATE MONOHYDRATE, DEXTROAMPHETAMINE SULFATE AND AMPHETAMINE SULFATE 2.5; 2.5; 2.5; 2.5 MG/1; MG/1; MG/1; MG/1
10 CAPSULE, EXTENDED RELEASE ORAL DAILY
Qty: 30 CAPSULE | Refills: 0 | Status: SHIPPED | OUTPATIENT
Start: 2023-11-17 | End: 2023-12-14

## 2023-11-17 NOTE — TELEPHONE ENCOUNTER
Amphetamine-dextroamphetamine (ADDERALL XR) 20 mg 24 hr cap      Last Written Prescription Date:  10/18/23  Last Fill Quantity: 30,   # refills: 0  Last Office Visit: 8/7/23  Future Office visit:    Next 5 appointments (look out 90 days)      Feb 07, 2024  2:00 PM  (Arrive by 1:45 PM)  SHORT with Rosa Hanson CNP  [facility] ([facility] ) [address]  [place] [address]  [phone]               Amphetamine-dextroamphetamine (ADDERALL XR) 10 mg 24 hr cap       Last Written Prescription Date:  10/18/23  Last Fill Quantity: 30,   # refills: 0  Last Office Visit: 8/7/23  Future Office visit:    Next 5 appointments (look out 90 days)      Feb 07, 2024  2:00 PM  (Arrive by 1:45 PM)  SHORT with Rosa Hanson CNP  [facility] ([facility] ) [address]  [place] [address]  [phone]

## 2023-12-18 ENCOUNTER — MYC REFILL (OUTPATIENT)
Dept: FAMILY MEDICINE | Facility: OTHER | Age: 28
End: 2023-12-18

## 2023-12-18 DIAGNOSIS — F41.9 ANXIETY: ICD-10-CM

## 2023-12-18 NOTE — TELEPHONE ENCOUNTER
Escitalopram (Lexapro) 10 MG tablet   Last Written Prescription Date:  10/18/2023  Last Fill Quantity: 90,   # refills: 1  Last Office Visit: 08/07/2023

## 2023-12-19 ENCOUNTER — MYC REFILL (OUTPATIENT)
Dept: FAMILY MEDICINE | Facility: OTHER | Age: 28
End: 2023-12-19

## 2023-12-19 DIAGNOSIS — F90.0 ATTENTION DEFICIT HYPERACTIVITY DISORDER (ADHD), PREDOMINANTLY INATTENTIVE TYPE: ICD-10-CM

## 2023-12-19 RX ORDER — ESCITALOPRAM OXALATE 10 MG/1
10 TABLET ORAL DAILY
Qty: 90 TABLET | Refills: 1 | Status: SHIPPED | OUTPATIENT
Start: 2023-12-19 | End: 2024-03-01

## 2023-12-19 NOTE — TELEPHONE ENCOUNTER
ADDERALL XR 10 AND 20      Last Written Prescription Date:  12-15-23  Last Fill Quantity: ONE MONTH,   # refills: 0  Last Office Visit: 8-7-23  Future Office visit:    Next 5 appointments (look out 90 days)      Feb 07, 2024  2:00 PM  (Arrive by 1:45 PM)  SHORT with Rosa Hanson CNP  [facility] ([facility] ) [address]  [facility] [address]  [phone]             Routing refill request to provider for review/approval because:  Drug not on the [facility], [facility] or [facility] refill protocol or controlled substance

## 2023-12-20 RX ORDER — DEXTROAMPHETAMINE SACCHARATE, AMPHETAMINE ASPARTATE MONOHYDRATE, DEXTROAMPHETAMINE SULFATE AND AMPHETAMINE SULFATE 5; 5; 5; 5 MG/1; MG/1; MG/1; MG/1
20 CAPSULE, EXTENDED RELEASE ORAL DAILY
Qty: 30 CAPSULE | Refills: 0 | OUTPATIENT
Start: 2023-12-20

## 2023-12-20 RX ORDER — DEXTROAMPHETAMINE SACCHARATE, AMPHETAMINE ASPARTATE MONOHYDRATE, DEXTROAMPHETAMINE SULFATE AND AMPHETAMINE SULFATE 2.5; 2.5; 2.5; 2.5 MG/1; MG/1; MG/1; MG/1
10 CAPSULE, EXTENDED RELEASE ORAL DAILY
Qty: 30 CAPSULE | Refills: 0 | OUTPATIENT
Start: 2023-12-20

## 2024-01-14 ENCOUNTER — MYC REFILL (OUTPATIENT)
Dept: FAMILY MEDICINE | Facility: OTHER | Age: 29
End: 2024-01-14

## 2024-01-14 DIAGNOSIS — F90.0 ATTENTION DEFICIT HYPERACTIVITY DISORDER (ADHD), PREDOMINANTLY INATTENTIVE TYPE: ICD-10-CM

## 2024-01-14 DIAGNOSIS — F41.9 ANXIETY: ICD-10-CM

## 2024-01-14 RX ORDER — ESCITALOPRAM OXALATE 20 MG/1
20 TABLET ORAL DAILY
Qty: 90 TABLET | Refills: 2 | Status: CANCELLED | OUTPATIENT
Start: 2024-01-14

## 2024-01-15 NOTE — TELEPHONE ENCOUNTER
amphetamine-dextroamphetamine (ADDERALL XR) 10 MG 24 hr capsule      amphetamine-dextroamphetamine (ADDERALL XR) 20 MG 24 hr capsule   12-15-23  Last Written Prescription Date:    Last Fill Quantity: 30,   # refills: 0  Last Office Visit: 8-7-23  Future Office visit:    Next 5 appointments (look out 90 days)      Feb 07, 2024  2:00 PM  (Arrive by 1:45 PM)  SHORT with Rosa Hanson CNP  [facility] ([facility] ) [address]  [place] [address]  [phone]             Routing refill request to provider for review/approval because:  Two doses      escitalopram (LEXAPRO) 10 MG table     Last Written Prescription Date:  12-19-23  Last Fill Quantity: 90,   # refills: 1  Last Office Visit: 8-7-23  Future Office visit:    Next 5 appointments (look out 90 days)      Feb 07, 2024  2:00 PM  (Arrive by 1:45 PM)  SHORT with Rsoa Hanson CNP  [facility] ([facility] ) [address]  [place] [address]  [phone]             Routing refill request to provider for review/approval because:  Drug not on the [facility], [facility] or [facility] refill protocol or controlled substance

## 2024-01-16 RX ORDER — DEXTROAMPHETAMINE SACCHARATE, AMPHETAMINE ASPARTATE MONOHYDRATE, DEXTROAMPHETAMINE SULFATE AND AMPHETAMINE SULFATE 5; 5; 5; 5 MG/1; MG/1; MG/1; MG/1
20 CAPSULE, EXTENDED RELEASE ORAL DAILY
Qty: 30 CAPSULE | Refills: 0 | Status: SHIPPED | OUTPATIENT
Start: 2024-01-16 | End: 2024-01-24

## 2024-01-16 RX ORDER — DEXTROAMPHETAMINE SACCHARATE, AMPHETAMINE ASPARTATE MONOHYDRATE, DEXTROAMPHETAMINE SULFATE AND AMPHETAMINE SULFATE 2.5; 2.5; 2.5; 2.5 MG/1; MG/1; MG/1; MG/1
10 CAPSULE, EXTENDED RELEASE ORAL DAILY
Qty: 30 CAPSULE | Refills: 0 | Status: SHIPPED | OUTPATIENT
Start: 2024-01-16 | End: 2024-02-14

## 2024-01-24 ENCOUNTER — MYC REFILL (OUTPATIENT)
Dept: FAMILY MEDICINE | Facility: OTHER | Age: 29
End: 2024-01-24

## 2024-01-24 DIAGNOSIS — F90.0 ATTENTION DEFICIT HYPERACTIVITY DISORDER (ADHD), PREDOMINANTLY INATTENTIVE TYPE: ICD-10-CM

## 2024-01-25 ENCOUNTER — MYC REFILL (OUTPATIENT)
Dept: FAMILY MEDICINE | Facility: OTHER | Age: 29
End: 2024-01-25

## 2024-01-25 DIAGNOSIS — F41.9 ANXIETY: ICD-10-CM

## 2024-01-25 NOTE — TELEPHONE ENCOUNTER
Adderall      Last Written Prescription Date:  1/16/21  Last Fill Quantity: 30,   # refills: 0  Last Office Visit: 8/7/23  Future Office visit:    Next 5 appointments (look out 90 days)      Feb 07, 2024  2:00 PM  (Arrive by 1:45 PM)  SHORT with Rosa Hanson CNP  [facility] ([facility] ) [address]  [facility] [address]  [phone]             Routing refill request to provider for review/approval because:

## 2024-01-26 RX ORDER — ESCITALOPRAM OXALATE 10 MG/1
10 TABLET ORAL DAILY
Qty: 90 TABLET | Refills: 1 | OUTPATIENT
Start: 2024-01-26

## 2024-01-26 RX ORDER — DEXTROAMPHETAMINE SACCHARATE, AMPHETAMINE ASPARTATE MONOHYDRATE, DEXTROAMPHETAMINE SULFATE AND AMPHETAMINE SULFATE 5; 5; 5; 5 MG/1; MG/1; MG/1; MG/1
20 CAPSULE, EXTENDED RELEASE ORAL DAILY
Qty: 30 CAPSULE | Refills: 0 | Status: SHIPPED | OUTPATIENT
Start: 2024-01-26 | End: 2024-02-14

## 2024-01-26 RX ORDER — ESCITALOPRAM OXALATE 20 MG/1
20 TABLET ORAL DAILY
Qty: 90 TABLET | Refills: 2 | OUTPATIENT
Start: 2024-01-26

## 2024-01-26 NOTE — TELEPHONE ENCOUNTER
Escitalopram (Lexapro) 10 MG tablet     Last Written Prescription Date:  12/19/2023  Last Fill Quantity: 90,   # refills: 1  Last Office Visit: 08/07/2023      Escitalopram (Lexapro) 20 MG tablet    Last Written Prescription Date:  10/18/2023  Last Fill Quantity: 90,   # refills: 2

## 2024-02-14 ENCOUNTER — MYC REFILL (OUTPATIENT)
Dept: FAMILY MEDICINE | Facility: OTHER | Age: 29
End: 2024-02-14

## 2024-02-14 DIAGNOSIS — F90.0 ATTENTION DEFICIT HYPERACTIVITY DISORDER (ADHD), PREDOMINANTLY INATTENTIVE TYPE: ICD-10-CM

## 2024-02-14 RX ORDER — DEXTROAMPHETAMINE SACCHARATE, AMPHETAMINE ASPARTATE MONOHYDRATE, DEXTROAMPHETAMINE SULFATE AND AMPHETAMINE SULFATE 2.5; 2.5; 2.5; 2.5 MG/1; MG/1; MG/1; MG/1
10 CAPSULE, EXTENDED RELEASE ORAL DAILY
Qty: 30 CAPSULE | Refills: 0 | Status: SHIPPED | OUTPATIENT
Start: 2024-02-14 | End: 2024-03-14

## 2024-02-14 RX ORDER — DEXTROAMPHETAMINE SACCHARATE, AMPHETAMINE ASPARTATE MONOHYDRATE, DEXTROAMPHETAMINE SULFATE AND AMPHETAMINE SULFATE 5; 5; 5; 5 MG/1; MG/1; MG/1; MG/1
20 CAPSULE, EXTENDED RELEASE ORAL DAILY
Qty: 30 CAPSULE | Refills: 0 | Status: SHIPPED | OUTPATIENT
Start: 2024-02-14 | End: 2024-03-14

## 2024-02-14 NOTE — TELEPHONE ENCOUNTER
amphetamine-dextroamphetamine (ADDERALL XR) 10 MG 24 hr capsule     Last Written Prescription Date:  1/16/24  Last Fill Quantity: 30,   # refills: 0  Last Office Visit: 8/7/23  Future Office visit:       Routing refill request to provider for review/approval because:  Drug not on the [facility], [facility] or [facility] refill protocol or controlled substance    amphetamine-dextroamphetamine (ADDERALL XR) 20 MG 24 hr capsule         Last Written Prescription Date:  1/26/24  Last Fill Quantity: 30,   # refills: 0  Last Office Visit: 8/7/23  Future Office visit:       Routing refill request to provider for review/approval because:    Drug not on the [facility], [facility] or [facility] refill protocol or controlled substance

## 2024-03-01 ENCOUNTER — MYC REFILL (OUTPATIENT)
Dept: FAMILY MEDICINE | Facility: OTHER | Age: 29
End: 2024-03-01

## 2024-03-01 DIAGNOSIS — F41.9 ANXIETY: ICD-10-CM

## 2024-03-01 NOTE — TELEPHONE ENCOUNTER
LEXAPRO    10MG  Last Written Prescription Date:  12-19-23  Last Fill Quantity: 90,   # refills: 1  Last Office Visit: 8-7-23  Future Office visit:

## 2024-03-04 RX ORDER — ESCITALOPRAM OXALATE 10 MG/1
10 TABLET ORAL DAILY
Qty: 90 TABLET | Refills: 1 | Status: SHIPPED | OUTPATIENT
Start: 2024-03-04 | End: 2024-06-07

## 2024-03-14 ENCOUNTER — MYC REFILL (OUTPATIENT)
Dept: FAMILY MEDICINE | Facility: OTHER | Age: 29
End: 2024-03-14

## 2024-03-14 DIAGNOSIS — F90.0 ATTENTION DEFICIT HYPERACTIVITY DISORDER (ADHD), PREDOMINANTLY INATTENTIVE TYPE: ICD-10-CM

## 2024-03-15 RX ORDER — DEXTROAMPHETAMINE SACCHARATE, AMPHETAMINE ASPARTATE MONOHYDRATE, DEXTROAMPHETAMINE SULFATE AND AMPHETAMINE SULFATE 2.5; 2.5; 2.5; 2.5 MG/1; MG/1; MG/1; MG/1
10 CAPSULE, EXTENDED RELEASE ORAL DAILY
Qty: 30 CAPSULE | Refills: 0 | Status: SHIPPED | OUTPATIENT
Start: 2024-03-15 | End: 2024-04-15

## 2024-03-15 RX ORDER — DEXTROAMPHETAMINE SACCHARATE, AMPHETAMINE ASPARTATE MONOHYDRATE, DEXTROAMPHETAMINE SULFATE AND AMPHETAMINE SULFATE 5; 5; 5; 5 MG/1; MG/1; MG/1; MG/1
20 CAPSULE, EXTENDED RELEASE ORAL DAILY
Qty: 30 CAPSULE | Refills: 0 | Status: SHIPPED | OUTPATIENT
Start: 2024-03-15 | End: 2024-04-15

## 2024-03-15 NOTE — TELEPHONE ENCOUNTER
amphetamine-dextroamphetamine (ADDERALL XR) 10 MG 24 hr capsule         Last Written Prescription Date:  2/14/24  Last Fill Quantity: 30,   # refills: 0  Last Office Visit: 8/7/23  Future Office visit:       Routing refill request to provider for review/approval because:  Drug not on the [facility], [facility] or [facility] refill protocol or controlled substance      amphetamine-dextroamphetamine (ADDERALL XR) 20 MG 24 hr capsule         Last Written Prescription Date:  2/14/24  Last Fill Quantity: 30,   # refills: 0  Last Office Visit: 8/7/23  Future Office visit:       Routing refill request to provider for review/approval because:  Drug not on the [facility], [facility] or [facility] refill protocol or controlled substance

## 2024-03-21 NOTE — PROGRESS NOTES
Assessment & Plan         Generalized hyperhidrosis  - aluminum chloride (DRYSOL) 20 % external solution; Apply topically at bedtime      Cystic acne  - spironolactone (ALDACTONE) 25 MG tablet; Take 1 tablet (25 mg) by mouth 2 times daily  - benzoyl peroxide-erythromycin (BENZAMYCIN) 5-3 % external gel; Apply topically 2 times daily      Anxiety  - Continue plan of care      Attention deficit hyperactivity disorder (ADHD), predominantly inattentive type  - Continue plan of care      Please continue to take all medication as directed  Please notify your pharmacy or our refill line of future refills needed.  Please return sooner than your next scheduled appointment with any concerns.        When your lab results return, we will call you with abnormal findings  You can also view this information in your MyChart, if you have an account.  Please call or Lenda message us with any questions you may have.          Return in about 6 months (around 9/27/2024).        Rosa Hanson [facility]  [phone]           Dorina is a 29 year old, presenting for the following health issues:        Derm Problem, A.D.H.D, and Anxiety        Concern - Acne  Onset: years  Description: acne is still an issue  Intensity: mild  Progression of Symptoms:  same  Accompanying Signs & Symptoms: none  Previous history of similar problem: none  Precipitating factors:        Worsened by:none  Alleviating factors:        Improved by: none  Therapies tried and outcome: None  Has tried OTC products        Concern- excessive sweating   Armpits only  Over time for the past year  No odor  Has tried multiple antiperspirants        Anxiety   How are you doing with your anxiety since your last visit? Improved   Are you having other symptoms that might be associated with anxiety? No  Have you had a significant life event? No   Are you feeling depressed? No  Do you have any concerns with your use of alcohol or other drugs? No        ADD  Uses Adderall XR  Doing  well -  stable on medication  No side effects noted  All symptoms are controlled          Social History     Tobacco Use    Smoking status: Former     Years: 5     Types: Cigarettes     Quit date: 2016     Years since quittin.2     Passive exposure: Past    Smokeless tobacco: Never   Vaping Use    Vaping Use: Never used   Substance Use Topics    Alcohol use: Not Currently     Comment: occasionally    Drug use: No             2023     3:00 PM 2023    12:52 PM 3/27/2024     2:12 PM   BETSY-7 SCORE   Total Score  9 (mild anxiety)    Total Score 7 9 10             2023    12:51 PM 3/27/2024     1:41 PM 3/27/2024     2:12 PM   PHQ   PHQ-9 Total Score 12 6 5   Q9: Thoughts of better off dead/self-harm past 2 weeks Not at all Not at all Not at all           Patient Active Problem List   Diagnosis    ADHD (attention deficit hyperactivity disorder)    Anxiety    Generalized hyperhidrosis    Cystic acne     Past Surgical History:   Procedure Laterality Date     SECTION N/A 10/15/2017    Procedure:  SECTION;   Section;  Surgeon: Ramez Marquez MD;  Location: [facility]    ORTHOPEDIC SURGERY Right 2018    rt wrist , ganglion cyst removed    TEAR DUCT SURGERY Left     blocked tear duct - left    TONSILLECTOMY  1997    tonsillitis       Social History     Tobacco Use    Smoking status: Former     Years: 5     Types: Cigarettes     Quit date: 2016     Years since quittin.2     Passive exposure: Past    Smokeless tobacco: Never   Substance Use Topics    Alcohol use: Not Currently     Comment: occasionally     Family History   Problem Relation Age of Onset    Diabetes Maternal Grandfather     Heart Disease Maternal Grandfather         heart disease    Myocardial Infarction Paternal Aunt     Myocardial Infarction Paternal Grandfather         cause of death    Coronary Artery Disease Paternal Grandfather          at 46 - fatal heart attack    Myocardial Infarction Paternal Uncle      Coronary Artery Disease Paternal Uncle     Coronary Artery Disease Paternal Aunt              Current Outpatient Medications   Medication Sig Dispense Refill    aluminum chloride (DRYSOL) 20 % external solution Apply topically at bedtime 60 mL 5    amphetamine-dextroamphetamine (ADDERALL XR) 10 MG 24 hr capsule Take 1 capsule (10 mg) by mouth daily 30 capsule 0    amphetamine-dextroamphetamine (ADDERALL XR) 20 MG 24 hr capsule Take 1 capsule (20 mg) by mouth daily 30 capsule 0    benzoyl peroxide-erythromycin (BENZAMYCIN) 5-3 % external gel Apply topically 2 times daily 46.6 g 5    escitalopram (LEXAPRO) 10 MG tablet Take 1 tablet (10 mg) by mouth daily 90 tablet 1    escitalopram (LEXAPRO) 20 MG tablet Take 1 tablet (20 mg) by mouth daily 90 tablet 2    spironolactone (ALDACTONE) 25 MG tablet Take 1 tablet (25 mg) by mouth 2 times daily 60 tablet 1         Allergies   Allergen Reactions    Bacitracin      Neosporin-Pt. Had eye swelling as a baby, Pt. States not even sure if she still has an allergy to neosporin    Bacitracin Zinc      Neosporin    Gramicidin D      Neosporin    Neomycin Sulfate      Neosporin    Polymyxin B      Neosporin    Polymyxin B Sulfate      Neosporin         Recent Labs   Lab Test 08/02/22  1350 08/10/18  1018 01/17/18  1507 10/16/17  0523 10/15/17  1102 10/14/17  1841 09/20/17  1050   A1C  --   --   --   --   --   --  5.6   ALT  --   --   --   --   --  11  --    CR  --  0.79  --  0.85   < > 1.11*  --    GFRESTIMATED  --  88  --  82   < > 60*  --    GFRESTBLACK  --  >90  --  >90   < > 73  --    POTASSIUM  --  4.0  --  4.1   < > 4.5  --    TSH 0.54  --  1.03  --   --   --   --     < > = values in this interval not displayed.          BP Readings from Last 3 Encounters:   03/27/24 115/80   08/07/23 110/76   02/23/23 108/74    Wt Readings from Last 3 Encounters:   03/27/24 64 kg (141 lb 3.2 oz)   08/07/23 64.5 kg (142 lb 1.6 oz)   02/23/23 67.1 kg (148 lb)                     Review of  Systems  Constitutional, neuro, ENT, endocrine, pulmonary, cardiac, gastrointestinal, genitourinary, musculoskeletal, integument and psychiatric systems are negative, except as otherwise noted.            Objective    /80 (BP Location: Right arm, Patient Position: Sitting, Cuff Size: Adult Large)   Pulse 95   Temp 98.4  F (36.9  C) (Tympanic)   Resp 18   Wt 64 kg (141 lb 3.2 oz)   LMP  (LMP Unknown)   SpO2 97%   Breastfeeding No   BMI 27.58 kg/m    Body mass index is 27.58 kg/m .            GENERAL: alert and no distress  EYES: Eyes grossly normal to inspection, PERRL and conjunctivae and sclerae normal  HENT: ear canals and TM's normal, nose and mouth without ulcers or lesions  NECK: no adenopathy, no asymmetry, masses, or scars  RESP: lungs clear to auscultation - no rales, rhonchi or wheezes  CV: regular rate and rhythm, normal S1 S2, no S3 or S4, no murmur, click or rub, no peripheral edema  SKIN: acne - diffuse, cystic - jaw line and chin  PSYCH: mentation appears normal, affect normal/bright        The longitudinal plan of care for the diagnosis(es)/condition(s) as documented were addressed during this visit. Due to the added complexity in care, I will continue to support Dorina in the subsequent management and with ongoing continuity of care.           Signed Electronically by: Rosa Hanson CNP

## 2024-03-27 ENCOUNTER — OFFICE VISIT (OUTPATIENT)
Dept: FAMILY MEDICINE | Facility: OTHER | Age: 29
End: 2024-03-27
Attending: NURSE PRACTITIONER
Payer: COMMERCIAL

## 2024-03-27 VITALS
DIASTOLIC BLOOD PRESSURE: 80 MMHG | OXYGEN SATURATION: 97 % | BODY MASS INDEX: 27.58 KG/M2 | RESPIRATION RATE: 18 BRPM | WEIGHT: 141.2 LBS | TEMPERATURE: 98.4 F | HEART RATE: 95 BPM | SYSTOLIC BLOOD PRESSURE: 115 MMHG

## 2024-03-27 DIAGNOSIS — F90.0 ATTENTION DEFICIT HYPERACTIVITY DISORDER (ADHD), PREDOMINANTLY INATTENTIVE TYPE: ICD-10-CM

## 2024-03-27 DIAGNOSIS — F41.9 ANXIETY: ICD-10-CM

## 2024-03-27 DIAGNOSIS — R61 GENERALIZED HYPERHIDROSIS: Primary | ICD-10-CM

## 2024-03-27 DIAGNOSIS — L70.0 CYSTIC ACNE: ICD-10-CM

## 2024-03-27 PROCEDURE — 99214 OFFICE O/P EST MOD 30 MIN: CPT | Performed by: NURSE PRACTITIONER

## 2024-03-27 PROCEDURE — G0463 HOSPITAL OUTPT CLINIC VISIT: HCPCS | Performed by: NURSE PRACTITIONER

## 2024-03-27 PROCEDURE — G2211 COMPLEX E/M VISIT ADD ON: HCPCS | Performed by: NURSE PRACTITIONER

## 2024-03-27 RX ORDER — SPIRONOLACTONE 25 MG/1
25 TABLET ORAL 2 TIMES DAILY
Qty: 60 TABLET | Refills: 1 | Status: SHIPPED | OUTPATIENT
Start: 2024-03-27 | End: 2024-06-07

## 2024-03-27 RX ORDER — ERYTHROMYCIN AND BENZOYL PEROXIDE 30; 50 MG/G; MG/G
GEL TOPICAL 2 TIMES DAILY
Qty: 46.6 G | Refills: 5 | Status: SHIPPED | OUTPATIENT
Start: 2024-03-27 | End: 2024-06-07

## 2024-03-27 ASSESSMENT — ANXIETY QUESTIONNAIRES
1. FEELING NERVOUS, ANXIOUS, OR ON EDGE: MORE THAN HALF THE DAYS
IF YOU CHECKED OFF ANY PROBLEMS ON THIS QUESTIONNAIRE, HOW DIFFICULT HAVE THESE PROBLEMS MADE IT FOR YOU TO DO YOUR WORK, TAKE CARE OF THINGS AT HOME, OR GET ALONG WITH OTHER PEOPLE: SOMEWHAT DIFFICULT
GAD7 TOTAL SCORE: 10
3. WORRYING TOO MUCH ABOUT DIFFERENT THINGS: MORE THAN HALF THE DAYS
2. NOT BEING ABLE TO STOP OR CONTROL WORRYING: MORE THAN HALF THE DAYS
GAD7 TOTAL SCORE: 10
7. FEELING AFRAID AS IF SOMETHING AWFUL MIGHT HAPPEN: SEVERAL DAYS
5. BEING SO RESTLESS THAT IT IS HARD TO SIT STILL: SEVERAL DAYS
6. BECOMING EASILY ANNOYED OR IRRITABLE: SEVERAL DAYS

## 2024-03-27 ASSESSMENT — PAIN SCALES - GENERAL: PAINLEVEL: NO PAIN (0)

## 2024-03-27 ASSESSMENT — PATIENT HEALTH QUESTIONNAIRE - PHQ9
SUM OF ALL RESPONSES TO PHQ QUESTIONS 1-9: 5
5. POOR APPETITE OR OVEREATING: SEVERAL DAYS

## 2024-03-27 NOTE — PATIENT INSTRUCTIONS
Assessment & Plan         Generalized hyperhidrosis  - aluminum chloride (DRYSOL) 20 % external solution; Apply topically at bedtime      Cystic acne  - spironolactone (ALDACTONE) 25 MG tablet; Take 1 tablet (25 mg) by mouth 2 times daily  - benzoyl peroxide-erythromycin (BENZAMYCIN) 5-3 % external gel; Apply topically 2 times daily      Anxiety  - Continue plan of care      Attention deficit hyperactivity disorder (ADHD), predominantly inattentive type  - Continue plan of care      Please continue to take all medication as directed  Please notify your pharmacy or our refill line of future refills needed.  Please return sooner than your next scheduled appointment with any concerns.        When your lab results return, we will call you with abnormal findings  You can also view this information in your MyChart, if you have an account.  Please call or ZIRX message us with any questions you may have.          Return in about 6 months (around 9/27/2024).        Rosa BROOKS  [phone]

## 2024-03-29 ENCOUNTER — TELEPHONE (OUTPATIENT)
Dept: FAMILY MEDICINE | Facility: OTHER | Age: 29
End: 2024-03-29

## 2024-03-29 NOTE — TELEPHONE ENCOUNTER
11:25 AM    Reason for Call: Phone Call    Description: Patient called in stating that the benzoyl peroxide-erythromycin was mixed in front of her and appears to be chunky, there is a layer stuck to the lid, and that it doesn't stick to her face. She's wondering if it were a bad batch or had been in the refrigerator too long or got too cold. Patient states that if she doesn't hear back by end of work today, she'll reach out to her pharmacy.  Please call patient back.     Was an appointment offered for this call? No  If yes : Appointment type              Date    Preferred method for responding to this message: Telephone Call  What is your phone number ? [phone]     If we cannot reach you directly, may we leave a detailed response at the number you provided? Yes    Can this message wait until your PCP/provider returns, if available today? Not applicable, PROVIDER IN CLINIC    Patrizia Anderson

## 2024-04-01 ENCOUNTER — MYC REFILL (OUTPATIENT)
Dept: FAMILY MEDICINE | Facility: OTHER | Age: 29
End: 2024-04-01

## 2024-04-01 DIAGNOSIS — F41.9 ANXIETY: ICD-10-CM

## 2024-04-01 NOTE — TELEPHONE ENCOUNTER
Disp Refills Start End NORBERTO   escitalopram (LEXAPRO) 10 MG tablet 90 tablet 1 3/4/2024 -- No      Disp Refills Start End NORBERTO   escitalopram (LEXAPRO) 20 MG tablet 90 tablet 2 10/18/2023 -- No     Last Office Visit: 03/27/2024  Future Office visit:       Routing refill request to provider for review/approval because:

## 2024-04-02 RX ORDER — ESCITALOPRAM OXALATE 20 MG/1
20 TABLET ORAL DAILY
Qty: 90 TABLET | Refills: 3 | Status: SHIPPED | OUTPATIENT
Start: 2024-04-02

## 2024-04-02 RX ORDER — ESCITALOPRAM OXALATE 10 MG/1
10 TABLET ORAL DAILY
Qty: 90 TABLET | Refills: 1 | OUTPATIENT
Start: 2024-04-02

## 2024-04-15 ENCOUNTER — MYC REFILL (OUTPATIENT)
Dept: FAMILY MEDICINE | Facility: OTHER | Age: 29
End: 2024-04-15

## 2024-04-15 DIAGNOSIS — F90.0 ATTENTION DEFICIT HYPERACTIVITY DISORDER (ADHD), PREDOMINANTLY INATTENTIVE TYPE: ICD-10-CM

## 2024-04-15 NOTE — TELEPHONE ENCOUNTER
Disp Refills Start End NORBERTO   amphetamine-dextroamphetamine (ADDERALL XR) 10 MG 24 hr capsule 30 capsule 0 3/15/2024 -- No      Disp Refills Start End NORBERTO   amphetamine-dextroamphetamine (ADDERALL XR) 20 MG 24 hr capsule 30 capsule 0 3/15/2024 -- No     Last Office Visit: 03/27/2024  Future Office visit:       Routing refill request to provider for review/approval because:

## 2024-04-16 RX ORDER — DEXTROAMPHETAMINE SACCHARATE, AMPHETAMINE ASPARTATE MONOHYDRATE, DEXTROAMPHETAMINE SULFATE AND AMPHETAMINE SULFATE 2.5; 2.5; 2.5; 2.5 MG/1; MG/1; MG/1; MG/1
10 CAPSULE, EXTENDED RELEASE ORAL DAILY
Qty: 30 CAPSULE | Refills: 0 | Status: SHIPPED | OUTPATIENT
Start: 2024-04-16 | End: 2024-05-13

## 2024-04-16 RX ORDER — DEXTROAMPHETAMINE SACCHARATE, AMPHETAMINE ASPARTATE MONOHYDRATE, DEXTROAMPHETAMINE SULFATE AND AMPHETAMINE SULFATE 5; 5; 5; 5 MG/1; MG/1; MG/1; MG/1
20 CAPSULE, EXTENDED RELEASE ORAL DAILY
Qty: 30 CAPSULE | Refills: 0 | Status: SHIPPED | OUTPATIENT
Start: 2024-04-16 | End: 2024-05-13

## 2024-04-16 NOTE — TELEPHONE ENCOUNTER
Routing refill request to provider for review/approval because:    Drug not on the [facility], [facility] or [facility] refill protocol or controlled substance

## 2024-05-13 ENCOUNTER — MYC REFILL (OUTPATIENT)
Dept: FAMILY MEDICINE | Facility: OTHER | Age: 29
End: 2024-05-13

## 2024-05-13 DIAGNOSIS — F41.9 ANXIETY: ICD-10-CM

## 2024-05-13 DIAGNOSIS — F90.0 ATTENTION DEFICIT HYPERACTIVITY DISORDER (ADHD), PREDOMINANTLY INATTENTIVE TYPE: ICD-10-CM

## 2024-05-13 DIAGNOSIS — L70.0 CYSTIC ACNE: ICD-10-CM

## 2024-05-13 NOTE — TELEPHONE ENCOUNTER
Adderall 20      Last Written Prescription Date:  4/16/24  Last Fill Quantity: 30,   # refills: 0  Last Office Visit: 3/27/24  Future Office visit:       Routing refill request to provider for review/approval because:      Adderall 10      Last Written Prescription Date:  4/16/24  Last Fill Quantity: 30,   # refills: 0  Last Office Visit: 3/27/24  Future Office visit:       Routing refill request to provider for review/approval because:

## 2024-05-14 RX ORDER — DEXTROAMPHETAMINE SACCHARATE, AMPHETAMINE ASPARTATE MONOHYDRATE, DEXTROAMPHETAMINE SULFATE AND AMPHETAMINE SULFATE 2.5; 2.5; 2.5; 2.5 MG/1; MG/1; MG/1; MG/1
10 CAPSULE, EXTENDED RELEASE ORAL DAILY
Qty: 30 CAPSULE | Refills: 0 | Status: SHIPPED | OUTPATIENT
Start: 2024-05-14 | End: 2024-06-17

## 2024-05-14 RX ORDER — ERYTHROMYCIN AND BENZOYL PEROXIDE 30; 50 MG/G; MG/G
GEL TOPICAL 2 TIMES DAILY
Qty: 46.6 G | Refills: 5 | Status: CANCELLED | OUTPATIENT
Start: 2024-05-14

## 2024-05-14 RX ORDER — ESCITALOPRAM OXALATE 10 MG/1
10 TABLET ORAL DAILY
Qty: 90 TABLET | Refills: 1 | Status: CANCELLED | OUTPATIENT
Start: 2024-05-14

## 2024-05-14 RX ORDER — DEXTROAMPHETAMINE SACCHARATE, AMPHETAMINE ASPARTATE MONOHYDRATE, DEXTROAMPHETAMINE SULFATE AND AMPHETAMINE SULFATE 5; 5; 5; 5 MG/1; MG/1; MG/1; MG/1
20 CAPSULE, EXTENDED RELEASE ORAL DAILY
Qty: 30 CAPSULE | Refills: 0 | Status: SHIPPED | OUTPATIENT
Start: 2024-05-14 | End: 2024-06-17

## 2024-06-07 ENCOUNTER — MYC REFILL (OUTPATIENT)
Dept: FAMILY MEDICINE | Facility: OTHER | Age: 29
End: 2024-06-07

## 2024-06-07 DIAGNOSIS — F41.9 ANXIETY: ICD-10-CM

## 2024-06-07 DIAGNOSIS — L70.0 CYSTIC ACNE: ICD-10-CM

## 2024-06-10 RX ORDER — ERYTHROMYCIN AND BENZOYL PEROXIDE 30; 50 MG/G; MG/G
GEL TOPICAL 2 TIMES DAILY
Qty: 46.6 G | Refills: 5 | Status: SHIPPED | OUTPATIENT
Start: 2024-06-10 | End: 2024-09-30

## 2024-06-10 RX ORDER — ESCITALOPRAM OXALATE 10 MG/1
10 TABLET ORAL DAILY
Qty: 90 TABLET | Refills: 1 | Status: SHIPPED | OUTPATIENT
Start: 2024-06-10 | End: 2024-09-09

## 2024-06-10 RX ORDER — SPIRONOLACTONE 25 MG/1
25 TABLET ORAL 2 TIMES DAILY
Qty: 60 TABLET | Refills: 1 | Status: SHIPPED | OUTPATIENT
Start: 2024-06-10

## 2024-06-10 NOTE — TELEPHONE ENCOUNTER
Benzamycin      Last Written Prescription Date:  3/27/24  Last Fill Quantity: 46.6g,   # refills: 5  Last Office Visit: 3/27/24  Future Office visit:       Escitalopram      Last Written Prescription Date:  3/4/24  Last Fill Quantity: 90,   # refills: 1  Last Office Visit: 3/27/24  Future Office visit:         Spironolactone      Last Written Prescription Date:  3/27/24  Last Fill Quantity: 60,   # refills: 1  Last Office Visit: 3/27/24  Future Office visit:

## 2024-06-17 ENCOUNTER — MYC REFILL (OUTPATIENT)
Dept: FAMILY MEDICINE | Facility: OTHER | Age: 29
End: 2024-06-17

## 2024-06-17 DIAGNOSIS — F90.0 ATTENTION DEFICIT HYPERACTIVITY DISORDER (ADHD), PREDOMINANTLY INATTENTIVE TYPE: ICD-10-CM

## 2024-06-18 RX ORDER — DEXTROAMPHETAMINE SACCHARATE, AMPHETAMINE ASPARTATE MONOHYDRATE, DEXTROAMPHETAMINE SULFATE AND AMPHETAMINE SULFATE 5; 5; 5; 5 MG/1; MG/1; MG/1; MG/1
20 CAPSULE, EXTENDED RELEASE ORAL DAILY
Qty: 30 CAPSULE | Refills: 0 | Status: SHIPPED | OUTPATIENT
Start: 2024-06-18 | End: 2024-07-17

## 2024-06-18 RX ORDER — DEXTROAMPHETAMINE SACCHARATE, AMPHETAMINE ASPARTATE MONOHYDRATE, DEXTROAMPHETAMINE SULFATE AND AMPHETAMINE SULFATE 2.5; 2.5; 2.5; 2.5 MG/1; MG/1; MG/1; MG/1
10 CAPSULE, EXTENDED RELEASE ORAL DAILY
Qty: 30 CAPSULE | Refills: 0 | Status: SHIPPED | OUTPATIENT
Start: 2024-06-18 | End: 2024-07-17

## 2024-06-18 NOTE — TELEPHONE ENCOUNTER
amphetamine-dextroamphetamine (ADDERALL XR) 10 MG 24 hr capsule 30 capsule 0 5/14/2024       amphetamine-dextroamphetamine (ADDERALL XR) 20 MG 24 hr capsule 30 capsule 0 5/14/2024     Last Office Visit: 3/27/24     Future Office visit:       Routing refill request to provider for review/approval because:

## 2024-07-07 ENCOUNTER — MYC REFILL (OUTPATIENT)
Dept: FAMILY MEDICINE | Facility: OTHER | Age: 29
End: 2024-07-07

## 2024-07-07 DIAGNOSIS — F41.9 ANXIETY: ICD-10-CM

## 2024-07-09 RX ORDER — ESCITALOPRAM OXALATE 20 MG/1
20 TABLET ORAL DAILY
Qty: 90 TABLET | Refills: 3 | OUTPATIENT
Start: 2024-07-09

## 2024-07-09 NOTE — TELEPHONE ENCOUNTER
Lexapro       Last Written Prescription Date:  6/10/2024  Last Fill Quantity: 90,   # refills: 1  Last Office Visit: 3/27/2024  Future Office visit:    Next 5 appointments (look out 90 days)      Sep 30, 2024 4:00 PM  (Arrive by 3:45 PM)  SHORT with Rosa Hanson CNP  [facility] ([facility] ) [address]  [place] [address]  [phone]

## 2024-07-17 ENCOUNTER — MYC REFILL (OUTPATIENT)
Dept: FAMILY MEDICINE | Facility: OTHER | Age: 29
End: 2024-07-17

## 2024-07-17 DIAGNOSIS — F90.0 ATTENTION DEFICIT HYPERACTIVITY DISORDER (ADHD), PREDOMINANTLY INATTENTIVE TYPE: ICD-10-CM

## 2024-07-18 NOTE — TELEPHONE ENCOUNTER
amphetamine-dextroamphetamine (ADDERALL XR) 10 MG 24 hr capsule        Last Written Prescription Date:  6/18/24  Last Fill Quantity: 30,   # refills: 0  Last Office Visit: 3/27/24  Future Office visit:    Next 5 appointments (look out 90 days)      Sep 30, 2024 4:00 PM  (Arrive by 3:45 PM)  SHORT with Rosa Hanson CNP  [facility] ([facility] ) [address]  [place] [address]  [phone]             Routing refill request to provider for review/approval because:  Drug not on the [facility], [facility] or [facility] refill protocol or controlled substance      amphetamine-dextroamphetamine (ADDERALL XR) 20 MG 24 hr capsule         Last Written Prescription Date:  6/18/24  Last Fill Quantity: 30,   # refills: 0  Last Office Visit: 3/27/24  Future Office visit:    Next 5 appointments (look out 90 days)      Sep 30, 2024 4:00 PM  (Arrive by 3:45 PM)  SHORT with Rosa Hanson CNP  [facility] ([facility] ) [address]  [place] [address]  [phone]             Routing refill request to provider for review/approval because:  Drug not on the [facility], [facility] or [facility] refill protocol or controlled substance

## 2024-07-19 RX ORDER — DEXTROAMPHETAMINE SACCHARATE, AMPHETAMINE ASPARTATE MONOHYDRATE, DEXTROAMPHETAMINE SULFATE AND AMPHETAMINE SULFATE 2.5; 2.5; 2.5; 2.5 MG/1; MG/1; MG/1; MG/1
10 CAPSULE, EXTENDED RELEASE ORAL DAILY
Qty: 30 CAPSULE | Refills: 0 | Status: SHIPPED | OUTPATIENT
Start: 2024-07-19 | End: 2024-08-13

## 2024-07-19 RX ORDER — DEXTROAMPHETAMINE SACCHARATE, AMPHETAMINE ASPARTATE MONOHYDRATE, DEXTROAMPHETAMINE SULFATE AND AMPHETAMINE SULFATE 5; 5; 5; 5 MG/1; MG/1; MG/1; MG/1
20 CAPSULE, EXTENDED RELEASE ORAL DAILY
Qty: 30 CAPSULE | Refills: 0 | Status: SHIPPED | OUTPATIENT
Start: 2024-07-19 | End: 2024-08-13

## 2024-08-13 ENCOUNTER — MYC REFILL (OUTPATIENT)
Dept: FAMILY MEDICINE | Facility: OTHER | Age: 29
End: 2024-08-13

## 2024-08-13 DIAGNOSIS — F90.0 ATTENTION DEFICIT HYPERACTIVITY DISORDER (ADHD), PREDOMINANTLY INATTENTIVE TYPE: ICD-10-CM

## 2024-08-14 RX ORDER — DEXTROAMPHETAMINE SACCHARATE, AMPHETAMINE ASPARTATE MONOHYDRATE, DEXTROAMPHETAMINE SULFATE AND AMPHETAMINE SULFATE 2.5; 2.5; 2.5; 2.5 MG/1; MG/1; MG/1; MG/1
10 CAPSULE, EXTENDED RELEASE ORAL DAILY
Qty: 30 CAPSULE | Refills: 0 | Status: SHIPPED | OUTPATIENT
Start: 2024-08-14 | End: 2024-09-09

## 2024-08-14 RX ORDER — DEXTROAMPHETAMINE SACCHARATE, AMPHETAMINE ASPARTATE MONOHYDRATE, DEXTROAMPHETAMINE SULFATE AND AMPHETAMINE SULFATE 5; 5; 5; 5 MG/1; MG/1; MG/1; MG/1
20 CAPSULE, EXTENDED RELEASE ORAL DAILY
Qty: 30 CAPSULE | Refills: 0 | Status: SHIPPED | OUTPATIENT
Start: 2024-08-14 | End: 2024-09-18

## 2024-09-06 NOTE — TELEPHONE ENCOUNTER
amphetamine-dextroamphetamine (ADDERALL XR) 10 MG 24 hr capsule         Last Written Prescription Date:  7/19/24  Last Fill Quantity: 30,   # refills: 0  Last Office Visit: 3/27/24  Future Office visit:    Next 5 appointments (look out 90 days)      Sep 30, 2024 4:00 PM  (Arrive by 3:45 PM)  SHORT with Rosa Hanson CNP  [facility] ([facility] ) [address]  [place] [address]  [phone]             Routing refill request to provider for review/approval because:  Drug not on the [facility], [facility] or [facility] refill protocol or controlled substance      amphetamine-dextroamphetamine (ADDERALL XR) 20 MG 24 hr capsule     Last Written Prescription Date:  7/19/24  Last Fill Quantity: 30,   # refills: 0  Last Office Visit: 3/27/24  Future Office visit:    Next 5 appointments (look out 90 days)      Sep 30, 2024 4:00 PM  (Arrive by 3:45 PM)  SHORT with Rosa Hanson CNP  [facility] ([facility] ) [address]  [place] [address]  [phone]             Routing refill request to provider for review/approval because:  Drug not on the [facility], [facility] or [facility] refill protocol or controlled substance    
172.72

## 2024-09-09 ENCOUNTER — MYC REFILL (OUTPATIENT)
Dept: FAMILY MEDICINE | Facility: OTHER | Age: 29
End: 2024-09-09

## 2024-09-09 DIAGNOSIS — F41.9 ANXIETY: ICD-10-CM

## 2024-09-09 DIAGNOSIS — F90.0 ATTENTION DEFICIT HYPERACTIVITY DISORDER (ADHD), PREDOMINANTLY INATTENTIVE TYPE: ICD-10-CM

## 2024-09-10 RX ORDER — ESCITALOPRAM OXALATE 10 MG/1
10 TABLET ORAL DAILY
Qty: 90 TABLET | Refills: 1 | Status: SHIPPED | OUTPATIENT
Start: 2024-09-10

## 2024-09-10 RX ORDER — DEXTROAMPHETAMINE SACCHARATE, AMPHETAMINE ASPARTATE MONOHYDRATE, DEXTROAMPHETAMINE SULFATE AND AMPHETAMINE SULFATE 2.5; 2.5; 2.5; 2.5 MG/1; MG/1; MG/1; MG/1
10 CAPSULE, EXTENDED RELEASE ORAL DAILY
Qty: 30 CAPSULE | Refills: 0 | Status: SHIPPED | OUTPATIENT
Start: 2024-09-10

## 2024-09-10 NOTE — TELEPHONE ENCOUNTER
escitalopram (LEXAPRO) 10 MG tablet         Last Written Prescription Date:  6/10/24  Last Fill Quantity: 90,   # refills: 1  Last Office Visit: 3/27/24  Future Office visit:    Next 5 appointments (look out 90 days)      Sep 30, 2024 4:00 PM  (Arrive by 3:45 PM)  SHORT with Rosa Hanson CNP  [facility] ([facility] ) [address]  [place] [address]  [phone]             Routing refill request to provider for review/approval because:    SSRIs Protocol [id number]/09/2024 10:21 PM   Protocol Details BETSY-7 score of less than 5 in past 6 months.         2/6/2023     3:00 PM 8/7/2023    12:52 PM 3/27/2024     2:12 PM   BETSY-7 SCORE   Total Score  9 (mild anxiety)    Total Score 7 9 10          amphetamine-dextroamphetamine (ADDERALL XR) 10 MG 24 hr capsule         Last Written Prescription Date:  8/14/24  Last Fill Quantity: 30,   # refills: 0  Last Office Visit: 3/27/24  Future Office visit:    Next 5 appointments (look out 90 days)      Sep 30, 2024 4:00 PM  (Arrive by 3:45 PM)  SHORT with Rosa Hanson CNP  [facility] ([facility] ) [address]  [place] [address]  [phone]             Routing refill request to provider for review/approval because:  Drug not on the [facility], P or [facility] refill protocol or controlled substance

## 2024-09-17 DIAGNOSIS — F90.0 ATTENTION DEFICIT HYPERACTIVITY DISORDER (ADHD), PREDOMINANTLY INATTENTIVE TYPE: ICD-10-CM

## 2024-09-17 NOTE — TELEPHONE ENCOUNTER
Routing refill request to provider for review/approval because:  Drug not on the [facility] or [facility] refill protocol or controlled substance

## 2024-09-17 NOTE — TELEPHONE ENCOUNTER
amphetamine-dextroamphetamine (ADDERALL XR) 20 MG 24 hr capsule 30 capsule 0 8/14/2024   Last Office Visit: 3/27/24     Future Office visit:    Next 5 appointments (look out 90 days)      Sep 30, 2024 4:00 PM  (Arrive by 3:45 PM)  SHORT with Rosa Hanson CNP  [facility] ([facility] ) [address]  [facility] [address]  [phone]             Routing refill request to provider for review/approval because:

## 2024-09-18 RX ORDER — DEXTROAMPHETAMINE SACCHARATE, AMPHETAMINE ASPARTATE MONOHYDRATE, DEXTROAMPHETAMINE SULFATE AND AMPHETAMINE SULFATE 5; 5; 5; 5 MG/1; MG/1; MG/1; MG/1
20 CAPSULE, EXTENDED RELEASE ORAL DAILY
Qty: 30 CAPSULE | Refills: 0 | Status: SHIPPED | OUTPATIENT
Start: 2024-09-18

## 2024-09-26 NOTE — PROGRESS NOTES
Assessment & Plan         Anxiety  - Continue plan of care        Attention deficit hyperactivity disorder (ADHD), predominantly inattentive type  - Continue plan of care        Acne  - Benzaclin gel        Hyperhydrosis  - Robinul as directed  - discontinue Drysol        Follow-up 6 months - sooner as needed        [facility]  [phone]           Dorina is a 29 year old, presenting for the following health issues:  Anxiety and A.D.H.D          Anxiety   How are you doing with your anxiety since your last visit? Worsened   Are you having other symptoms that might be associated with anxiety? No  Have you had a significant life event? Housing Concerns and OTHER: Gavin father went to snf    Are you feeling depressed? No  Do you have any concerns with your use of alcohol or other drugs? No          Social History     Tobacco Use    Smoking status: Former     Current packs/day: 0.00     Types: Cigarettes     Start date:      Quit date:      Years since quittin.7     Passive exposure: Past    Smokeless tobacco: Never   Vaping Use    Vaping status: Never Used   Substance Use Topics    Alcohol use: Not Currently     Comment: occasionally    Drug use: No             2023    12:52 PM 3/27/2024     2:12 PM 2024    11:47 AM   BETSY-7 SCORE   Total Score 9 (mild anxiety)  8 (mild anxiety)   Total Score 9 10 8             3/27/2024     1:41 PM 3/27/2024     2:12 PM 2024    11:46 AM   PHQ   PHQ-9 Total Score 6 5 6   Q9: Thoughts of better off dead/self-harm past 2 weeks Not at all Not at all Not at all           ADHD  Onset: years year(s) ago   Description:   Easily distracted: YES  Short attention span: YES  Trouble following directions: No   Impulsive behavior: No   Trouble completing tasks: YES- sometimes  Accompanying Signs & Symptoms:        Change in sleep pattern: No  Irritability at certain times of the day: No  Socially withdrawn: YES  Depression symptoms: YES  Anxiety symptoms:  YES  History:  Caffeine intake: Small  Loss of appetite: No  Healthy diet: YES  Did you have problems in school or with previous employment: No  Family history of ADHD: No  Have you had an evaluation for ADHD in the past: YES  Do you use alcohol or drugs: No  Therapies tried: Adderall (concerta) with intermittent relief           Hyperhidrosis  Drysol not working  Will try Robinul        Acne  - Would like an alternative to current topical          Patient Active Problem List   Diagnosis    ADHD (attention deficit hyperactivity disorder)    Anxiety    Generalized hyperhidrosis    Cystic acne     Past Surgical History:   Procedure Laterality Date     SECTION N/A 10/15/2017    Procedure:  SECTION;   Section;  Surgeon: Ramez Marquez MD;  Location: [facility]    ORTHOPEDIC SURGERY Right 2018    rt wrist , ganglion cyst removed    TEAR DUCT SURGERY Left     blocked tear duct - left    TONSILLECTOMY  1997    tonsillitis       Social History     Tobacco Use    Smoking status: Former     Current packs/day: 0.00     Types: Cigarettes     Start date:      Quit date:      Years since quittin.7     Passive exposure: Past    Smokeless tobacco: Never   Substance Use Topics    Alcohol use: Not Currently     Comment: occasionally     Family History   Problem Relation Age of Onset    Diabetes Maternal Grandfather     Heart Disease Maternal Grandfather         heart disease    Myocardial Infarction Paternal Aunt     Myocardial Infarction Paternal Grandfather         cause of death    Coronary Artery Disease Paternal Grandfather          at 46 - fatal heart attack    Myocardial Infarction Paternal Uncle     Coronary Artery Disease Paternal Uncle     Coronary Artery Disease Paternal Aunt              Current Outpatient Medications   Medication Sig Dispense Refill    aluminum chloride (DRYSOL) 20 % external solution Apply topically at bedtime 60 mL 5    amphetamine-dextroamphetamine  (ADDERALL XR) 10 MG 24 hr capsule Take 1 capsule (10 mg) by mouth daily. 30 capsule 0    amphetamine-dextroamphetamine (ADDERALL XR) 20 MG 24 hr capsule TAKE 1 CAPSULE BY MOUTH DAILY 30 capsule 0    benzoyl peroxide-erythromycin (BENZAMYCIN) 5-3 % external gel Apply topically 2 times daily 46.6 g 5    escitalopram (LEXAPRO) 10 MG tablet Take 1 tablet (10 mg) by mouth daily. 90 tablet 1    escitalopram (LEXAPRO) 20 MG tablet Take 1 tablet (20 mg) by mouth daily 90 tablet 3    spironolactone (ALDACTONE) 25 MG tablet Take 1 tablet (25 mg) by mouth 2 times daily 60 tablet 1         Allergies   Allergen Reactions    Bacitracin      Neosporin-Pt. Had eye swelling as a baby, Pt. States not even sure if she still has an allergy to neosporin    Bacitracin Zinc      Neosporin    Gramicidin D      Neosporin    Neomycin Sulfate      Neosporin    Polymyxin B      Neosporin    Polymyxin B Sulfate      Neosporin         Recent Labs   Lab Test 08/02/22  1350 08/10/18  1018 01/17/18  1507 10/16/17  0523 10/15/17  1102 10/14/17  1841 09/20/17  1050   A1C  --   --   --   --   --   --  5.6   ALT  --   --   --   --   --  11  --    CR  --  0.79  --  0.85   < > 1.11*  --    GFRESTIMATED  --  88  --  82   < > 60*  --    GFRESTBLACK  --  >90  --  >90   < > 73  --    POTASSIUM  --  4.0  --  4.1   < > 4.5  --    TSH 0.54  --  1.03  --   --   --   --     < > = values in this interval not displayed.          BP Readings from Last 3 Encounters:   09/30/24 122/79   03/27/24 115/80   08/07/23 110/76    Wt Readings from Last 3 Encounters:   09/30/24 61.1 kg (134 lb 11.2 oz)   03/27/24 64 kg (141 lb 3.2 oz)   08/07/23 64.5 kg (142 lb 1.6 oz)                       Review of Systems  Constitutional, HEENT, cardiovascular, pulmonary, GI, , musculoskeletal, neuro, skin, endocrine and psych systems are negative, except as otherwise noted.          Objective    /79 (BP Location: Left arm, Patient Position: Sitting, Cuff Size: Adult Regular)    Pulse 97   Temp 98.7  F (37.1  C) (Tympanic)   Resp 18   Wt 61.1 kg (134 lb 11.2 oz)   SpO2 99%   Breastfeeding No   BMI 26.31 kg/m    Body mass index is 26.31 kg/m .          Physical Exam   GENERAL: alert and no distress  RESP: lungs clear to auscultation - no rales, rhonchi or wheezes  CV: regular rate and rhythm, normal S1 S2, no S3 or S4, no murmur, click or rub, no peripheral edema  SKIN: no suspicious lesions or rashes  PSYCH: mentation appears normal, affect normal/bright        The longitudinal plan of care for the diagnosis(es)/condition(s) as documented were addressed during this visit. Due to the added complexity in care, I will continue to support Dorina in the subsequent management and with ongoing continuity of care.           Signed Electronically by: Rosa Hanson CNP

## 2024-09-30 ENCOUNTER — OFFICE VISIT (OUTPATIENT)
Dept: FAMILY MEDICINE | Facility: OTHER | Age: 29
End: 2024-09-30
Attending: NURSE PRACTITIONER
Payer: COMMERCIAL

## 2024-09-30 VITALS
OXYGEN SATURATION: 99 % | WEIGHT: 134.7 LBS | RESPIRATION RATE: 18 BRPM | HEART RATE: 97 BPM | TEMPERATURE: 98.7 F | BODY MASS INDEX: 26.31 KG/M2 | DIASTOLIC BLOOD PRESSURE: 79 MMHG | SYSTOLIC BLOOD PRESSURE: 122 MMHG

## 2024-09-30 DIAGNOSIS — L70.0 CYSTIC ACNE: ICD-10-CM

## 2024-09-30 DIAGNOSIS — F90.0 ATTENTION DEFICIT HYPERACTIVITY DISORDER (ADHD), PREDOMINANTLY INATTENTIVE TYPE: ICD-10-CM

## 2024-09-30 DIAGNOSIS — F41.9 ANXIETY: Primary | ICD-10-CM

## 2024-09-30 DIAGNOSIS — R61 GENERALIZED HYPERHIDROSIS: ICD-10-CM

## 2024-09-30 PROCEDURE — 99214 OFFICE O/P EST MOD 30 MIN: CPT | Performed by: NURSE PRACTITIONER

## 2024-09-30 PROCEDURE — G2211 COMPLEX E/M VISIT ADD ON: HCPCS | Performed by: NURSE PRACTITIONER

## 2024-09-30 PROCEDURE — G0463 HOSPITAL OUTPT CLINIC VISIT: HCPCS

## 2024-09-30 RX ORDER — CLINDAMYCIN AND BENZOYL PEROXIDE 10; 50 MG/G; MG/G
GEL TOPICAL 2 TIMES DAILY
Qty: 35 G | Refills: 5 | Status: SHIPPED | OUTPATIENT
Start: 2024-09-30

## 2024-09-30 RX ORDER — GLYCOPYRROLATE 1 MG/1
1 TABLET ORAL 3 TIMES DAILY
Qty: 90 TABLET | Refills: 3 | Status: SHIPPED | OUTPATIENT
Start: 2024-09-30

## 2024-09-30 ASSESSMENT — ANXIETY QUESTIONNAIRES
5. BEING SO RESTLESS THAT IT IS HARD TO SIT STILL: SEVERAL DAYS
8. IF YOU CHECKED OFF ANY PROBLEMS, HOW DIFFICULT HAVE THESE MADE IT FOR YOU TO DO YOUR WORK, TAKE CARE OF THINGS AT HOME, OR GET ALONG WITH OTHER PEOPLE?: SOMEWHAT DIFFICULT
2. NOT BEING ABLE TO STOP OR CONTROL WORRYING: SEVERAL DAYS
7. FEELING AFRAID AS IF SOMETHING AWFUL MIGHT HAPPEN: SEVERAL DAYS
7. FEELING AFRAID AS IF SOMETHING AWFUL MIGHT HAPPEN: SEVERAL DAYS
GAD7 TOTAL SCORE: 8
GAD7 TOTAL SCORE: 8
3. WORRYING TOO MUCH ABOUT DIFFERENT THINGS: MORE THAN HALF THE DAYS
6. BECOMING EASILY ANNOYED OR IRRITABLE: SEVERAL DAYS
IF YOU CHECKED OFF ANY PROBLEMS ON THIS QUESTIONNAIRE, HOW DIFFICULT HAVE THESE PROBLEMS MADE IT FOR YOU TO DO YOUR WORK, TAKE CARE OF THINGS AT HOME, OR GET ALONG WITH OTHER PEOPLE: SOMEWHAT DIFFICULT
1. FEELING NERVOUS, ANXIOUS, OR ON EDGE: SEVERAL DAYS
4. TROUBLE RELAXING: SEVERAL DAYS
GAD7 TOTAL SCORE: 8

## 2024-09-30 ASSESSMENT — PATIENT HEALTH QUESTIONNAIRE - PHQ9
SUM OF ALL RESPONSES TO PHQ QUESTIONS 1-9: 6
10. IF YOU CHECKED OFF ANY PROBLEMS, HOW DIFFICULT HAVE THESE PROBLEMS MADE IT FOR YOU TO DO YOUR WORK, TAKE CARE OF THINGS AT HOME, OR GET ALONG WITH OTHER PEOPLE: SOMEWHAT DIFFICULT
SUM OF ALL RESPONSES TO PHQ QUESTIONS 1-9: 6

## 2024-09-30 ASSESSMENT — PAIN SCALES - GENERAL: PAINLEVEL: MODERATE PAIN (4)

## 2024-09-30 NOTE — PATIENT INSTRUCTIONS
Assessment & Plan         Anxiety  - Continue plan of care        Attention deficit hyperactivity disorder (ADHD), predominantly inattentive type  - Continue plan of care        Acne  - Benzaclin gel        Hyperhydrosis  - Robinul as directed  - discontinue Drysol        Follow-up 6 months - sooner as needed        [facility]  [phone]

## 2024-10-10 ENCOUNTER — MYC REFILL (OUTPATIENT)
Dept: FAMILY MEDICINE | Facility: OTHER | Age: 29
End: 2024-10-10

## 2024-10-10 DIAGNOSIS — F41.9 ANXIETY: ICD-10-CM

## 2024-10-10 DIAGNOSIS — F90.0 ATTENTION DEFICIT HYPERACTIVITY DISORDER (ADHD), PREDOMINANTLY INATTENTIVE TYPE: ICD-10-CM

## 2024-10-11 RX ORDER — ESCITALOPRAM OXALATE 10 MG/1
10 TABLET ORAL DAILY
Qty: 90 TABLET | Refills: 1 | Status: SHIPPED | OUTPATIENT
Start: 2024-10-11

## 2024-10-11 RX ORDER — DEXTROAMPHETAMINE SACCHARATE, AMPHETAMINE ASPARTATE MONOHYDRATE, DEXTROAMPHETAMINE SULFATE AND AMPHETAMINE SULFATE 2.5; 2.5; 2.5; 2.5 MG/1; MG/1; MG/1; MG/1
10 CAPSULE, EXTENDED RELEASE ORAL DAILY
Qty: 30 CAPSULE | Refills: 0 | Status: SHIPPED | OUTPATIENT
Start: 2024-10-11 | End: 2024-11-10

## 2024-10-11 NOTE — TELEPHONE ENCOUNTER
escitalopram (LEXAPRO) 10 MG tablet       Last Written Prescription Date:  9/10/24  Last Fill Quantity: 90,   # refills: 1  Last Office Visit: 9/30/24  Future Office visit:       Routing refill request to provider for review/approval because:    SSRIs Protocol [id number]/10/2024 11:17 PM   Protocol Details   BETSY-7 score of less than 5 in past 6 months.            8/7/2023    12:52 PM 3/27/2024     2:12 PM 9/30/2024    11:47 AM   BETSY-7 SCORE   Total Score 9 (mild anxiety)  8 (mild anxiety)   Total Score 9 10 8             amphetamine-dextroamphetamine (ADDERALL XR) 10 MG 24 hr capsule         Last Written Prescription Date:  9/18/24  Last Fill Quantity: 30,   # refills: 0  Last Office Visit: 9/30/24  Future Office visit:       Routing refill request to provider for review/approval because:  Drug not on the G, P or [facility] refill protocol or controlled substance

## 2024-10-17 ENCOUNTER — MYC REFILL (OUTPATIENT)
Dept: FAMILY MEDICINE | Facility: OTHER | Age: 29
End: 2024-10-17

## 2024-10-17 DIAGNOSIS — F90.0 ATTENTION DEFICIT HYPERACTIVITY DISORDER (ADHD), PREDOMINANTLY INATTENTIVE TYPE: ICD-10-CM

## 2024-10-17 RX ORDER — DEXTROAMPHETAMINE SACCHARATE, AMPHETAMINE ASPARTATE MONOHYDRATE, DEXTROAMPHETAMINE SULFATE AND AMPHETAMINE SULFATE 5; 5; 5; 5 MG/1; MG/1; MG/1; MG/1
20 CAPSULE, EXTENDED RELEASE ORAL DAILY
Qty: 30 CAPSULE | Refills: 0 | OUTPATIENT
Start: 2024-10-17

## 2024-10-22 DIAGNOSIS — F90.0 ATTENTION DEFICIT HYPERACTIVITY DISORDER (ADHD), PREDOMINANTLY INATTENTIVE TYPE: ICD-10-CM

## 2024-10-23 RX ORDER — DEXTROAMPHETAMINE SACCHARATE, AMPHETAMINE ASPARTATE MONOHYDRATE, DEXTROAMPHETAMINE SULFATE AND AMPHETAMINE SULFATE 5; 5; 5; 5 MG/1; MG/1; MG/1; MG/1
20 CAPSULE, EXTENDED RELEASE ORAL DAILY
Qty: 30 CAPSULE | Refills: 0 | Status: SHIPPED | OUTPATIENT
Start: 2024-10-23

## 2024-10-23 NOTE — TELEPHONE ENCOUNTER
DEXTROAMP-AMPHET ER 20MG CAP       Last Written Prescription Date:  9/18/24  Last Fill Quantity: 30,   # refills: 0  Last Office Visit: 9/30/24  Future Office visit:       Routing refill request to provider for review/approval because:  Drug not on the [facility], [facility] or [facility] refill protocol or controlled substance

## 2024-11-10 ENCOUNTER — MYC REFILL (OUTPATIENT)
Dept: FAMILY MEDICINE | Facility: OTHER | Age: 29
End: 2024-11-10

## 2024-11-10 DIAGNOSIS — F90.0 ATTENTION DEFICIT HYPERACTIVITY DISORDER (ADHD), PREDOMINANTLY INATTENTIVE TYPE: ICD-10-CM

## 2024-11-11 NOTE — TELEPHONE ENCOUNTER
Adderall      Last Written Prescription Date:  10/11/24  Last Fill Quantity: 30,   # refills: 0  Last Office Visit: 09/30/24  Future Office visit:

## 2024-11-12 RX ORDER — DEXTROAMPHETAMINE SACCHARATE, AMPHETAMINE ASPARTATE MONOHYDRATE, DEXTROAMPHETAMINE SULFATE AND AMPHETAMINE SULFATE 2.5; 2.5; 2.5; 2.5 MG/1; MG/1; MG/1; MG/1
10 CAPSULE, EXTENDED RELEASE ORAL DAILY
Qty: 30 CAPSULE | Refills: 0 | Status: SHIPPED | OUTPATIENT
Start: 2024-11-12

## 2024-12-19 ENCOUNTER — MYC REFILL (OUTPATIENT)
Dept: FAMILY MEDICINE | Facility: OTHER | Age: 29
End: 2024-12-19

## 2024-12-19 DIAGNOSIS — F90.0 ATTENTION DEFICIT HYPERACTIVITY DISORDER (ADHD), PREDOMINANTLY INATTENTIVE TYPE: ICD-10-CM

## 2024-12-20 NOTE — TELEPHONE ENCOUNTER
Adderall XR      Last Written Prescription Date:  11/26/24  Last Fill Quantity: 30,   # refills: 0  Last Office Visit: 9/30/24  Future Office visit:       Routing refill request to provider for review/approval because:

## 2024-12-23 RX ORDER — DEXTROAMPHETAMINE SACCHARATE, AMPHETAMINE ASPARTATE MONOHYDRATE, DEXTROAMPHETAMINE SULFATE AND AMPHETAMINE SULFATE 5; 5; 5; 5 MG/1; MG/1; MG/1; MG/1
20 CAPSULE, EXTENDED RELEASE ORAL DAILY
Qty: 30 CAPSULE | Refills: 0 | Status: SHIPPED | OUTPATIENT
Start: 2024-12-23

## 2025-01-12 ENCOUNTER — MYC REFILL (OUTPATIENT)
Dept: FAMILY MEDICINE | Facility: OTHER | Age: 30
End: 2025-01-12

## 2025-01-12 DIAGNOSIS — F90.0 ATTENTION DEFICIT HYPERACTIVITY DISORDER (ADHD), PREDOMINANTLY INATTENTIVE TYPE: ICD-10-CM

## 2025-01-13 RX ORDER — DEXTROAMPHETAMINE SACCHARATE, AMPHETAMINE ASPARTATE MONOHYDRATE, DEXTROAMPHETAMINE SULFATE AND AMPHETAMINE SULFATE 2.5; 2.5; 2.5; 2.5 MG/1; MG/1; MG/1; MG/1
10 CAPSULE, EXTENDED RELEASE ORAL DAILY
Qty: 30 CAPSULE | Refills: 0 | Status: SHIPPED | OUTPATIENT
Start: 2025-01-13

## 2025-01-13 NOTE — TELEPHONE ENCOUNTER
Encounter Date: 12/14/2019       History     Chief Complaint   Patient presents with    Fall     Reports 20 weeks prenant and fell yesterday. Denies vaginal bleeding. Complaining of pain to lower back and lower abdomen.      22-year-old female with presents the emergency room with lower abdominal pain and lower back pain after she fell yesterday.  Patient states that she slipped and fell on her stomach.  She is currently 20 weeks pregnant was concerned so wanted to get evaluated.  She did not call her OB.  She describes the pain as a throbbing and it is a 2/10.  Patient denies vaginal bleeding.    The history is provided by the patient. The history is limited by a language barrier. A  was used ([employer]om[employer]).     Review of patient's allergies indicates:   Allergen Reactions    Pork/porcine containing products Nausea And Vomiting     Diarrhea      History reviewed. No pertinent past medical history.  History reviewed. No pertinent surgical history.  Family History   Problem Relation Age of Onset    No Known Problems Paternal Grandfather     No Known Problems Paternal Grandmother     No Known Problems Maternal Grandmother     Diabetes Maternal Grandfather     No Known Problems Father     No Known Problems Mother     No Known Problems Brother     No Known Problems Sister      Social History     Tobacco Use    Smoking status: Never Smoker    Smokeless tobacco: Never Used   Substance Use Topics    Alcohol use: No    Drug use: No     Review of Systems   Constitutional: Negative for chills and fever.   HENT: Negative for sore throat.    Respiratory: Negative for shortness of breath.    Cardiovascular: Negative for chest pain.   Gastrointestinal: Positive for abdominal pain (Lower). Negative for nausea.   Genitourinary: Negative for dysuria, vaginal bleeding and vaginal discharge.   Musculoskeletal: Positive for back pain.   Skin: Negative for rash.   Neurological: Negative for weakness.  amphetamine-dextroamphetamine (ADDERALL XR) 10 MG 24 hr capsule         Last Written Prescription Date:  12/13/24  Last Fill Quantity: 30,   # refills: 0  Last Office Visit: 9/30/24  Future Office visit:    Next 5 appointments (look out 90 days)      Apr 02, 2025 3:30 PM  (Arrive by 3:15 PM)  Adult Preventative Visit with Rosa Hanson CNP  [facility] ([facility] ) [address]  [facility] [address]  [phone]             Routing refill request to provider for review/approval because:  Drug not on the [facility], [facility] or  [facility] refill protocol or controlled substance       Hematological: Does not bruise/bleed easily.   All other systems reviewed and are negative.    Physical Exam     Initial Vitals [19 1753]   BP Pulse Resp Temp SpO2   105/63 105 18 98.7 °F (37.1 °C) 100 %      MAP       --         Physical Exam    Nursing note and vitals reviewed.  Constitutional: She appears well-developed and well-nourished.   HENT:   Head: Normocephalic and atraumatic.   Eyes: Conjunctivae and EOM are normal. Pupils are equal, round, and reactive to light.   Neck: Normal range of motion.   Cardiovascular: Normal rate, regular rhythm, normal heart sounds and intact distal pulses. Exam reveals no gallop and no friction rub.    No murmur heard.  Pulmonary/Chest: Breath sounds normal. No respiratory distress. She has no wheezes. She has no rhonchi. She has no rales. She exhibits no tenderness.   Abdominal: Soft. Bowel sounds are normal. She exhibits no distension and no mass. There is no tenderness. There is no rebound and no guarding.    abdomen consistent with 20 week gestation   Musculoskeletal: Normal range of motion. She exhibits no edema or tenderness.   Neurological: She is alert and oriented to person, place, and time. She has normal strength. GCS score is 15. GCS eye subscore is 4. GCS verbal subscore is 5. GCS motor subscore is 6.   Skin: Skin is warm. Capillary refill takes less than 2 seconds.       ED Course   ED US Pelvis OB  Date/Time: 2019 7:06 PM  Performed by: KAMILA Kelly  Authorized by: Denilson Walker MD   Comments: Abdominal probe used to evaluate fetus-fetal movement noted, fetal heart tones in the 150s        Labs Reviewed   URINALYSIS, REFLEX TO URINE CULTURE    Narrative:     Preferred Collection Type->Urine, Clean Catch           Medical Decision Making:   Initial Assessment:   22-year-old female with presents the emergency room with lower abdominal pain and lower back pain after she fell yesterday.  Patient states that she slipped and fell  on her stomach.  She is currently 20 weeks pregnant was concerned so wanted to get evaluated.  She did not call her OB.  She describes the pain as a throbbing and it is a 2/10.    Differential Diagnosis:   Early labor, fall, abdominal pain, lower back pain, UTI  Clinical Tests:   Lab Tests: Ordered and Reviewed  The following lab test(s) were unremarkable: Urinalysis  ED Management:  Patient examined has a normal exam.  Bedside ultrasound completed in there is good movement of the fetus.  Patient was able to see this and felt much better after seeing the baby move.  Bart Loera called and states that there is no further workup needed. Patient given strict return precautions and voiced understanding of all discharge instructions. Pt was stable at discharge.                        ED Course as of Dec 14 1900   Sat Dec 14, 2019   1800 BP: 105/63 [AT]   1800 Temp: 98.7 °F (37.1 °C) [AT]   1800 Temp src: Oral [AT]   1800 Pulse: 105 [AT]   1800 Resp: 18 [AT]   1800 SpO2: 100 % [AT]   1857 Dr. Loera and she does not feel there is any further work up needed    [AT]      ED Course User Index  [AT] KAMILA Kelly        Clinical Impression:       ICD-10-CM ICD-9-CM   1. Fall, initial encounter W19.XXXA E888.9   2. 20 weeks gestation of pregnancy Z3A.20 V22.2                             KAMILA Kelly  12/14/19 1909

## 2025-01-15 ENCOUNTER — MYC REFILL (OUTPATIENT)
Dept: FAMILY MEDICINE | Facility: OTHER | Age: 30
End: 2025-01-15

## 2025-01-15 DIAGNOSIS — F41.9 ANXIETY: ICD-10-CM

## 2025-01-16 NOTE — TELEPHONE ENCOUNTER
Lexapro      Last Written Prescription Date:  12/13/24  Last Fill Quantity: 90,   # refills: 1  Last Office Visit: 9/30/24  Future Office visit:    Next 5 appointments (look out 90 days)      Apr 02, 2025 3:30 PM  (Arrive by 3:15 PM)  Adult Preventative Visit with Rosa Hanson CNP  [facility] ([facility] ) [address]  [facility] [address]  [phone]             Routing refill request to provider for review/approval because:

## 2025-01-18 RX ORDER — ESCITALOPRAM OXALATE 20 MG/1
20 TABLET ORAL DAILY
Qty: 90 TABLET | Refills: 0 | Status: SHIPPED | OUTPATIENT
Start: 2025-01-18

## 2025-01-20 ENCOUNTER — MYC REFILL (OUTPATIENT)
Dept: FAMILY MEDICINE | Facility: OTHER | Age: 30
End: 2025-01-20

## 2025-01-20 DIAGNOSIS — F90.0 ATTENTION DEFICIT HYPERACTIVITY DISORDER (ADHD), PREDOMINANTLY INATTENTIVE TYPE: ICD-10-CM

## 2025-01-20 NOTE — TELEPHONE ENCOUNTER
Adderall  Last Written Prescription Date: 12/23/24  Last Fill Quantity: 30 # of Refills: 0  Last Office Visit: 9/30/24

## 2025-01-21 RX ORDER — DEXTROAMPHETAMINE SACCHARATE, AMPHETAMINE ASPARTATE MONOHYDRATE, DEXTROAMPHETAMINE SULFATE AND AMPHETAMINE SULFATE 5; 5; 5; 5 MG/1; MG/1; MG/1; MG/1
20 CAPSULE, EXTENDED RELEASE ORAL DAILY
Qty: 30 CAPSULE | Refills: 0 | Status: SHIPPED | OUTPATIENT
Start: 2025-01-21

## 2025-01-21 NOTE — TELEPHONE ENCOUNTER
Routing refill request to provider for review/approval because:  Drug not on the [facility] [facility] or [facility] refill protocol or controlled substance

## 2025-02-14 ENCOUNTER — MYC REFILL (OUTPATIENT)
Dept: FAMILY MEDICINE | Facility: OTHER | Age: 30
End: 2025-02-14

## 2025-02-14 DIAGNOSIS — F90.0 ATTENTION DEFICIT HYPERACTIVITY DISORDER (ADHD), PREDOMINANTLY INATTENTIVE TYPE: ICD-10-CM

## 2025-02-17 RX ORDER — DEXTROAMPHETAMINE SACCHARATE, AMPHETAMINE ASPARTATE MONOHYDRATE, DEXTROAMPHETAMINE SULFATE AND AMPHETAMINE SULFATE 5; 5; 5; 5 MG/1; MG/1; MG/1; MG/1
20 CAPSULE, EXTENDED RELEASE ORAL DAILY
Qty: 30 CAPSULE | Refills: 0 | Status: SHIPPED | OUTPATIENT
Start: 2025-02-17

## 2025-02-17 RX ORDER — DEXTROAMPHETAMINE SACCHARATE, AMPHETAMINE ASPARTATE MONOHYDRATE, DEXTROAMPHETAMINE SULFATE AND AMPHETAMINE SULFATE 2.5; 2.5; 2.5; 2.5 MG/1; MG/1; MG/1; MG/1
10 CAPSULE, EXTENDED RELEASE ORAL DAILY
Qty: 30 CAPSULE | Refills: 0 | Status: SHIPPED | OUTPATIENT
Start: 2025-02-17

## 2025-02-17 NOTE — TELEPHONE ENCOUNTER
Adderall 20mg      Last Written Prescription Date:  1/21/25  Last Fill Quantity: 30,   # refills: 0  Last Office Visit: 9/30/24  Future Office visit:    Next 5 appointments (look out 90 days)      Apr 02, 2025 3:30 PM  (Arrive by 3:15 PM)  Adult Preventative Visit with Rosa Hanson CNP  [facility] ([facility] ) [address]  [place] [address]  [phone]             Routing refill request to provider for review/approval because:      Adderall 10mg      Last Written Prescription Date:  1/13/25  Last Fill Quantity: 30,   # refills: 0  Last Office Visit: 9/30/24  Future Office visit:    Next 5 appointments (look out 90 days)      Apr 02, 2025 3:30 PM  (Arrive by 3:15 PM)  Adult Preventative Visit with Rosa Hanson CNP  [facility] ([facility] ) [address]  [place] [address]  [phone]             Routing refill request to provider for review/approval because:

## 2025-03-18 ENCOUNTER — MYC REFILL (OUTPATIENT)
Dept: FAMILY MEDICINE | Facility: OTHER | Age: 30
End: 2025-03-18

## 2025-03-18 DIAGNOSIS — F90.0 ATTENTION DEFICIT HYPERACTIVITY DISORDER (ADHD), PREDOMINANTLY INATTENTIVE TYPE: ICD-10-CM

## 2025-03-18 DIAGNOSIS — F41.9 ANXIETY: ICD-10-CM

## 2025-03-19 RX ORDER — DEXTROAMPHETAMINE SACCHARATE, AMPHETAMINE ASPARTATE MONOHYDRATE, DEXTROAMPHETAMINE SULFATE AND AMPHETAMINE SULFATE 2.5; 2.5; 2.5; 2.5 MG/1; MG/1; MG/1; MG/1
10 CAPSULE, EXTENDED RELEASE ORAL DAILY
Qty: 30 CAPSULE | Refills: 0 | Status: SHIPPED | OUTPATIENT
Start: 2025-03-19

## 2025-03-19 RX ORDER — ESCITALOPRAM OXALATE 10 MG/1
10 TABLET ORAL DAILY
Qty: 90 TABLET | Refills: 1 | Status: SHIPPED | OUTPATIENT
Start: 2025-03-19

## 2025-03-19 RX ORDER — DEXTROAMPHETAMINE SACCHARATE, AMPHETAMINE ASPARTATE MONOHYDRATE, DEXTROAMPHETAMINE SULFATE AND AMPHETAMINE SULFATE 5; 5; 5; 5 MG/1; MG/1; MG/1; MG/1
20 CAPSULE, EXTENDED RELEASE ORAL DAILY
Qty: 30 CAPSULE | Refills: 0 | Status: SHIPPED | OUTPATIENT
Start: 2025-03-19

## 2025-03-19 NOTE — TELEPHONE ENCOUNTER
escitalopram (LEXAPRO) 10 MG tablet       Routing refill request to provider for review/approval because:    SSRIs Protocol [id number]/18/2025 04:26 PM   Protocol Details BETSY-7 score of less than 5 in past 6 months.            8/7/2023    12:52 PM 3/27/2024     2:12 PM 9/30/2024    11:47 AM   BETSY-7 SCORE   Total Score 9 (mild anxiety)  8 (mild anxiety)   Total Score 9 10 8         amphetamine-dextroamphetamine (ADDERALL XR) 10 MG 24 hr capsule   Routing refill request to provider for review/approval because:  Drug not on the G, P or [facility] refill protocol or controlled substance  amphetamine-dextroamphetamine (ADDERALL XR) 20 MG 24 hr capsule   Routing refill request to provider for review/approval because:  Drug not on the G, P or [facility] refill protocol or controlled substance

## 2025-03-19 NOTE — TELEPHONE ENCOUNTER
Lexapro  Last Written Prescription Date: 12/13/24  Last Fill Quantity: 90 # of Refills: 1  Last Office Visit: 9/30/24    Adderall XR 20 mg  Last Written Prescription Date: 2/17/25  Last Fill Quantity: 30 # of Refills: 0  Last Office Visit: 9/30/24    Adderall XR 10 mg  Last Written Prescription Date: 2/17/25  Last Fill Quantity: 30 # of Refills: 0  Last Office Visit: 9/30/24

## 2025-04-01 SDOH — HEALTH STABILITY: PHYSICAL HEALTH: ON AVERAGE, HOW MANY DAYS PER WEEK DO YOU ENGAGE IN MODERATE TO STRENUOUS EXERCISE (LIKE A BRISK WALK)?: 3 DAYS

## 2025-04-01 SDOH — HEALTH STABILITY: PHYSICAL HEALTH: ON AVERAGE, HOW MANY MINUTES DO YOU ENGAGE IN EXERCISE AT THIS LEVEL?: 10 MIN

## 2025-04-01 ASSESSMENT — PATIENT HEALTH QUESTIONNAIRE - PHQ9
10. IF YOU CHECKED OFF ANY PROBLEMS, HOW DIFFICULT HAVE THESE PROBLEMS MADE IT FOR YOU TO DO YOUR WORK, TAKE CARE OF THINGS AT HOME, OR GET ALONG WITH OTHER PEOPLE: SOMEWHAT DIFFICULT
SUM OF ALL RESPONSES TO PHQ QUESTIONS 1-9: 6
SUM OF ALL RESPONSES TO PHQ QUESTIONS 1-9: 6

## 2025-04-01 ASSESSMENT — SOCIAL DETERMINANTS OF HEALTH (SDOH): HOW OFTEN DO YOU GET TOGETHER WITH FRIENDS OR RELATIVES?: TWICE A WEEK

## 2025-04-06 SDOH — HEALTH STABILITY: PHYSICAL HEALTH: ON AVERAGE, HOW MANY DAYS PER WEEK DO YOU ENGAGE IN MODERATE TO STRENUOUS EXERCISE (LIKE A BRISK WALK)?: 3 DAYS

## 2025-04-06 SDOH — HEALTH STABILITY: PHYSICAL HEALTH: ON AVERAGE, HOW MANY MINUTES DO YOU ENGAGE IN EXERCISE AT THIS LEVEL?: 10 MIN

## 2025-04-06 ASSESSMENT — SOCIAL DETERMINANTS OF HEALTH (SDOH): HOW OFTEN DO YOU GET TOGETHER WITH FRIENDS OR RELATIVES?: TWICE A WEEK

## 2025-04-09 ENCOUNTER — OFFICE VISIT (OUTPATIENT)
Dept: FAMILY MEDICINE | Facility: OTHER | Age: 30
End: 2025-04-09
Attending: NURSE PRACTITIONER
Payer: COMMERCIAL

## 2025-04-09 VITALS
BODY MASS INDEX: 27.25 KG/M2 | TEMPERATURE: 97.9 F | WEIGHT: 138.8 LBS | DIASTOLIC BLOOD PRESSURE: 64 MMHG | RESPIRATION RATE: 18 BRPM | HEART RATE: 92 BPM | SYSTOLIC BLOOD PRESSURE: 110 MMHG | HEIGHT: 60 IN | OXYGEN SATURATION: 97 %

## 2025-04-09 DIAGNOSIS — F90.0 ATTENTION DEFICIT HYPERACTIVITY DISORDER (ADHD), PREDOMINANTLY INATTENTIVE TYPE: ICD-10-CM

## 2025-04-09 DIAGNOSIS — Z00.00 ROUTINE HISTORY AND PHYSICAL EXAMINATION OF ADULT: Primary | ICD-10-CM

## 2025-04-09 DIAGNOSIS — L70.0 CYSTIC ACNE: ICD-10-CM

## 2025-04-09 DIAGNOSIS — R06.2 WHEEZING: ICD-10-CM

## 2025-04-09 DIAGNOSIS — F41.9 ANXIETY: ICD-10-CM

## 2025-04-09 PROCEDURE — G0463 HOSPITAL OUTPT CLINIC VISIT: HCPCS | Mod: 25

## 2025-04-09 RX ORDER — CLINDAMYCIN AND BENZOYL PEROXIDE 10; 50 MG/G; MG/G
GEL TOPICAL 2 TIMES DAILY
Qty: 50 G | Refills: 5 | Status: SHIPPED | OUTPATIENT
Start: 2025-04-09

## 2025-04-09 RX ORDER — DEXTROAMPHETAMINE SACCHARATE, AMPHETAMINE ASPARTATE MONOHYDRATE, DEXTROAMPHETAMINE SULFATE AND AMPHETAMINE SULFATE 5; 5; 5; 5 MG/1; MG/1; MG/1; MG/1
20 CAPSULE, EXTENDED RELEASE ORAL DAILY
Qty: 30 CAPSULE | Refills: 0 | Status: SHIPPED | OUTPATIENT
Start: 2025-04-19

## 2025-04-09 RX ORDER — ALBUTEROL SULFATE 90 UG/1
2 INHALANT RESPIRATORY (INHALATION) EVERY 6 HOURS PRN
Qty: 18 G | Refills: 11 | Status: SHIPPED | OUTPATIENT
Start: 2025-04-09

## 2025-04-09 RX ORDER — DEXTROAMPHETAMINE SACCHARATE, AMPHETAMINE ASPARTATE MONOHYDRATE, DEXTROAMPHETAMINE SULFATE AND AMPHETAMINE SULFATE 2.5; 2.5; 2.5; 2.5 MG/1; MG/1; MG/1; MG/1
10 CAPSULE, EXTENDED RELEASE ORAL DAILY
Qty: 30 CAPSULE | Refills: 0 | Status: SHIPPED | OUTPATIENT
Start: 2025-04-19

## 2025-04-09 RX ORDER — DOXYCYCLINE 100 MG/1
CAPSULE ORAL
Qty: 90 CAPSULE | Refills: 3 | Status: SHIPPED | OUTPATIENT
Start: 2025-04-09

## 2025-04-09 ASSESSMENT — ANXIETY QUESTIONNAIRES
8. IF YOU CHECKED OFF ANY PROBLEMS, HOW DIFFICULT HAVE THESE MADE IT FOR YOU TO DO YOUR WORK, TAKE CARE OF THINGS AT HOME, OR GET ALONG WITH OTHER PEOPLE?: SOMEWHAT DIFFICULT
6. BECOMING EASILY ANNOYED OR IRRITABLE: NOT AT ALL
GAD7 TOTAL SCORE: 6
3. WORRYING TOO MUCH ABOUT DIFFERENT THINGS: SEVERAL DAYS
7. FEELING AFRAID AS IF SOMETHING AWFUL MIGHT HAPPEN: NOT AT ALL
GAD7 TOTAL SCORE: 6
IF YOU CHECKED OFF ANY PROBLEMS ON THIS QUESTIONNAIRE, HOW DIFFICULT HAVE THESE PROBLEMS MADE IT FOR YOU TO DO YOUR WORK, TAKE CARE OF THINGS AT HOME, OR GET ALONG WITH OTHER PEOPLE: SOMEWHAT DIFFICULT
7. FEELING AFRAID AS IF SOMETHING AWFUL MIGHT HAPPEN: NOT AT ALL
4. TROUBLE RELAXING: SEVERAL DAYS
GAD7 TOTAL SCORE: 6
1. FEELING NERVOUS, ANXIOUS, OR ON EDGE: SEVERAL DAYS
5. BEING SO RESTLESS THAT IT IS HARD TO SIT STILL: MORE THAN HALF THE DAYS
2. NOT BEING ABLE TO STOP OR CONTROL WORRYING: SEVERAL DAYS

## 2025-04-09 ASSESSMENT — PAIN SCALES - GENERAL: PAINLEVEL_OUTOF10: NO PAIN (0)

## 2025-04-09 NOTE — PATIENT INSTRUCTIONS
Assessment & Plan           Routine history and physical examination of adult  - Annual physical 1 year      Attention deficit hyperactivity disorder (ADHD), predominantly inattentive type - stable  - amphetamine-dextroamphetamine (ADDERALL XR) 20 MG 24 hr capsule; Take 1 capsule (20 mg) by mouth daily.  - amphetamine-dextroamphetamine (ADDERALL XR) 10 MG 24 hr capsule; Take 1 capsule (10 mg) by mouth daily.      Anxiety - stable  - Continue lexapro as directed      Cystic acne - symptoms have increased  - Cetaphil for face washing  - doxycycline hyclate (VIBRAMYCIN) 100 MG capsule; 1 po BID for 2 weeks, then one daily  - clindamycin-benzoyl peroxide (BENZACLIN) 1-5 % external gel; Apply topically 2 times daily.      Wheezing - active  - albuterol (PROAIR HFA/PROVENTIL HFA/VENTOLIN HFA) 108 (90 Base) MCG/ACT inhaler; Inhale 2 puffs into the lungs every 6 hours as needed for shortness of breath, wheezing or cough.        Patient has been advised of split billing requirements and indicates understanding: Yes            Return in about 6 months (around 10/9/2025).        Please continue to take all medication as directed  Please notify your pharmacy or our refill line of future refills needed.  Please return sooner than your next scheduled appointment with any concerns.        When your lab results return, we will call you with abnormal findings  You can also view this information in your MyChart, if you have an account.  Please call or [employer] message us with any questions you may have.          [facility]  [phone]         Preventive Care Advice   This is general advice given by our system to help you stay healthy. However, your care team may have specific advice just for you. Please talk to your care team about your preventive care needs.  Nutrition  Eat 5 or more servings of fruits and vegetables each day.  Try wheat bread, brown rice and whole grain pasta (instead of white bread, rice, and  pasta).  Get enough calcium and vitamin D. Check the label on foods and aim for 100% of the RDA (recommended daily allowance).  Lifestyle  Exercise at least 150 minutes each week  (30 minutes a day, 5 days a week).  Do muscle strengthening activities 2 days a week. These help control your weight and prevent disease.  No smoking.  Wear sunscreen to prevent skin cancer.  Have a dental exam and cleaning every 6 months.  Yearly exams  See your health care team every year to talk about:  Any changes in your health.  Any medicines your care team has prescribed.  Preventive care, family planning, and ways to prevent chronic diseases.  Shots (vaccines)   HPV shots (up to age 26), if you've never had them before.  Hepatitis B shots (up to age 59), if you've never had them before.  COVID-19 shot: Get this shot when it's due.  Flu shot: Get a flu shot every year.  Tetanus shot: Get a tetanus shot every 10 years.  Pneumococcal, hepatitis A, and RSV shots: Ask your care team if you need these based on your risk.  Shingles shot (for age 50 and up)  General health tests  Diabetes screening:  Starting at age 35, Get screened for diabetes at least every 3 years.  If you are younger than age 35, ask your care team if you should be screened for diabetes.  Cholesterol test: At age 39, start having a cholesterol test every 5 years, or more often if advised.  Bone density scan (DEXA): At age 50, ask your care team if you should have this scan for osteoporosis (brittle bones).  Hepatitis C: Get tested at least once in your life.  STIs (sexually transmitted infections)  Before age 24: Ask your care team if you should be screened for STIs.  After age 24: Get screened for STIs if you're at risk. You are at risk for STIs (including HIV) if:  You are sexually active with more than one person.  You don't use condoms every time.  You or a partner was diagnosed with a sexually transmitted infection.  If you are at risk for HIV, ask about PrEP  medicine to prevent HIV.  Get tested for HIV at least once in your life, whether you are at risk for HIV or not.  Cancer screening tests  Cervical cancer screening: If you have a cervix, begin getting regular cervical cancer screening tests starting at age 21.  Breast cancer scan (mammogram): If you've ever had breasts, begin having regular mammograms starting at age 40. This is a scan to check for breast cancer.  Colon cancer screening: It is important to start screening for colon cancer at age 45.  Have a colonoscopy test every 10 years (or more often if you're at risk) Or, ask your provider about stool tests like a FIT test every year or Cologuard test every 3 years.  To learn more about your testing options, visit:   .  For help making a decision, visit:   https://bit.ly/ub51584.  Prostate cancer screening test: If you have a prostate, ask your care team if a prostate cancer screening test (PSA) at age 55 is right for you.  Lung cancer screening: If you are a current or former smoker ages 50 to 80, ask your care team if ongoing lung cancer screenings are right for you.  For informational purposes only. Not to replace the advice of your health care provider. Copyright   2023 [facility] [employer]. All rights reserved. Clinically reviewed by the [facility]. URBANARA [id number] - REV 01/24.  Learning About Stress  What is stress?     Stress is your body's response to a hard situation. Your body can have a physical, emotional, or mental response. Stress is a fact of life for most people, and it affects everyone differently. What causes stress for you may not be stressful for someone else.  A lot of things can cause stress. You may feel stress when you go on a job interview, take a test, or run a race. This kind of short-term stress is normal and even useful. It can help you if you need to work hard or react quickly. For example, stress can help you finish an important job on  time.  Long-term stress is caused by ongoing stressful situations or events. Examples of long-term stress include long-term health problems, ongoing problems at work, or conflicts in your family. Long-term stress can harm your health.  How does stress affect your health?  When you are stressed, your body responds as though you are in danger. It makes hormones that speed up your heart, make you breathe faster, and give you a burst of energy. This is called the fight-or-flight stress response. If the stress is over quickly, your body goes back to normal and no harm is done.  But if stress happens too often or lasts too long, it can have bad effects. Long-term stress can make you more likely to get sick, and it can make symptoms of some diseases worse. If you tense up when you are stressed, you may develop neck, shoulder, or low back pain. Stress is linked to high blood pressure and heart disease.  Stress also harms your emotional health. It can make you lieberman, tense, or depressed. Your relationships may suffer, and you may not do well at work or school.  What can you do to manage stress?  You can try these things to help manage stress:   Do something active. Exercise or activity can help reduce stress. Walking is a great way to get started. Even everyday activities such as housecleaning or yard work can help.  Try yoga or macho chi. These techniques combine exercise and meditation. You may need some training at first to learn them.  Do something you enjoy. For example, listen to music or go to a movie. Practice your hobby or do volunteer work.  Meditate. This can help you relax, because you are not worrying about what happened before or what may happen in the future.  Do guided imagery. Imagine yourself in any setting that helps you feel calm. You can use online videos, books, or a teacher to guide you.  Do breathing exercises. For example:  From a standing position, bend forward from the waist with your knees slightly  "bent. Let your arms dangle close to the floor.  Breathe in slowly and deeply as you return to a standing position. Roll up slowly and lift your head last.  Hold your breath for just a few seconds in the standing position.  Breathe out slowly and bend forward from the waist.  Let your feelings out. Talk, laugh, cry, and express anger when you need to. Talking with supportive friends or family, a counselor, or a rhona leader about your feelings is a healthy way to relieve stress. Avoid discussing your feelings with people who make you feel worse.  Write. It may help to write about things that are bothering you. This helps you find out how much stress you feel and what is causing it. When you know this, you can find better ways to cope.  What can you do to prevent stress?  You might try some of these things to help prevent stress:  Manage your time. This helps you find time to do the things you want and need to do.  Get enough sleep. Your body recovers from the stresses of the day while you are sleeping.  Get support. Your family, friends, and community can make a difference in how you experience stress.  Limit your news feed. Avoid or limit time on social media or news that may make you feel stressed.  Do something active. Exercise or activity can help reduce stress. Walking is a great way to get started.  Where can you learn more?  Go to https://www.[employer].net/patiented  Enter N032 in the search box to learn more about \"Learning About Stress.\"  Current as of: October 24, 2024  Content Version: 14.4    5798-5925 [employer].   Care instructions adapted under license by your healthcare professional. If you have questions about a medical condition or this instruction, always ask your healthcare professional. [employer] disclaims any warranty or liability for your use of this information.    Learning About Depression Screening  What is depression screening?  Depression screening is a way to see " "if you have depression symptoms. It may be done by a doctor or counselor. It's often part of a routine checkup. That's because your mental health is just as important as your physical health.  Depression is a mental health condition that affects how you feel, think, and act. You may:  Have less energy.  Lose interest in your daily activities.  Feel sad and grouchy for a long time.  Depression is very common. It affects people of all ages.  Many things can lead to depression. Some people become depressed after they have a stroke or find out they have a major illness like cancer or heart disease. The death of a loved one or a breakup may lead to depression. It can run in families. Most experts believe that a combination of inherited genes and stressful life events can cause it.  What happens during screening?  You may be asked to fill out a form about your depression symptoms. You and the doctor will discuss your answers. The doctor may ask you more questions to learn more about how you think, act, and feel.  What happens after screening?  If you have symptoms of depression, your doctor will talk to you about your options.  Doctors usually treat depression with medicines or counseling. Often, combining the two works best. Many people don't get help because they think that they'll get over the depression on their own. But people with depression may not get better unless they get treatment.  The cause of depression is not well understood. There may be many factors involved. But if you have depression, it's not your fault.  A serious symptom of depression is thinking about death or suicide. If you or someone you care about talks about this or about feeling hopeless, get help right away.  It's important to know that depression can be treated. Medicine, counseling, and self-care may help.  Where can you learn more?  Go to https://www.healthwise.net/patiented  Enter T185 in the search box to learn more about \"Learning About " "Depression Screening.\"  Current as of: July 31, 2024  Content Version: 14.4    5248-4551 [employer].   Care instructions adapted under license by your healthcare professional. If you have questions about a medical condition or this instruction, always ask your healthcare professional. [employer] disclaims any warranty or liability for your use of this information.    Safer Sex: Care Instructions  Overview  Safer sex is a way to reduce your risk of getting a sexually transmitted infection (STI). It can also help prevent pregnancy.  Several products can help you practice safer sex and reduce your chance of STIs. One of the best is a condom. There are internal and external condoms. You can use a special rubber sheet (dental dam) for protection during oral sex. Disposable gloves can keep your hands from touching blood, semen, or other body fluids that can carry infections.  Remember that birth control methods such as diaphragms, IUDs, foams, and birth control pills do not stop you from getting STIs.  Follow-up care is a key part of your treatment and safety. Be sure to make and go to all appointments, and call your doctor if you are having problems. It's also a good idea to know your test results and keep a list of the medicines you take.  How can you care for yourself at home?  Think about getting vaccinated to help prevent hepatitis A, hepatitis B, and human papillomavirus (HPV). They can be spread through sex.  Use a condom every time you have sex. Use an external condom, which goes on the penis. Or use an internal condom, which goes into the vagina or anus.  Make sure you use the right size external condom. A condom that's too small can break easily. A condom that's too big can slip off during sex.  Use a new condom each time you have sex. Be careful not to poke a hole in the condom when you open the wrapper.  Don't use an internal condom and an external condom at the same time.  Never use " "petroleum jelly (such as Vaseline), grease, hand lotion, baby oil, or anything with oil in it. These products can make holes in the condom.  After intercourse, hold the edge of the condom as you remove it. This will help keep semen from spilling out of the condom.  Do not have sex with anyone who has symptoms of an STI, such as sores on the genitals or mouth.  Do not drink a lot of alcohol or use drugs before sex.  Limit your sex partners. Sex with one partner who has sex only with you can reduce your risk of getting an STI.  Don't share sex toys. But if you do share them, use a condom and clean the sex toys between each use.  Talk to any partners before you have sex. Talk about what you feel comfortable with and whether you have any boundaries with sex. And find out if your partner or partners may be at risk for any STI. Keep in mind that a person may be able to spread an STI even if they do not have symptoms. You and any partners may want to get tested for STIs.  Where can you learn more?  Go to https://www.[employer].net/patiented  Enter B608 in the search box to learn more about \"Safer Sex: Care Instructions.\"  Current as of: April 30, 2024  Content Version: 14.4    9031-4806 [employer].   Care instructions adapted under license by your healthcare professional. If you have questions about a medical condition or this instruction, always ask your healthcare professional. [employer] disclaims any warranty or liability for your use of this information.       "

## 2025-04-09 NOTE — PROGRESS NOTES
Preventive Care Visit  [facility]  Rosa Valentin, [facility], Family Medicine        Apr 9, 2025          Assessment & Plan         Routine history and physical examination of adult  - Annual physical 1 year      Attention deficit hyperactivity disorder (ADHD), predominantly inattentive type - stable  - amphetamine-dextroamphetamine (ADDERALL XR) 20 MG 24 hr capsule; Take 1 capsule (20 mg) by mouth daily.  - amphetamine-dextroamphetamine (ADDERALL XR) 10 MG 24 hr capsule; Take 1 capsule (10 mg) by mouth daily.      Anxiety - stable  - Continue lexapro as directed      Cystic acne - symptoms have increased  - Cetaphil for face washing  - doxycycline hyclate (VIBRAMYCIN) 100 MG capsule; 1 po BID for 2 weeks, then one daily  - clindamycin-benzoyl peroxide (BENZACLIN) 1-5 % external gel; Apply topically 2 times daily.      Wheezing - active  - albuterol (PROAIR HFA/PROVENTIL HFA/VENTOLIN HFA) 108 (90 Base) MCG/ACT inhaler; Inhale 2 puffs into the lungs every 6 hours as needed for shortness of breath, wheezing or cough.        Patient has been advised of split billing requirements and indicates understanding: Yes          Return in about 6 months (around 10/9/2025).        Please continue to take all medication as directed  Please notify your pharmacy or our refill line of future refills needed.  Please return sooner than your next scheduled appointment with any concerns.        When your lab results return, we will call you with abnormal findings  You can also view this information in your MyChart, if you have an account.  Please call or [employer] message us with any questions you may have.          Rosa Hanson [facility]  [phone]             Dorina is a 30 year old, presenting for the following:  Physical       ADHD  Onset:  year(s) ago   Description:   Easily distracted: YES  Short attention span: YES  Trouble following directions: No   Impulsive behavior: No   Trouble completing tasks: YES  Accompanying Signs & Symptoms:         Change in sleep pattern: no  Irritability at certain times of the day: No  Socially withdrawn: No  Depression symptoms: YES  Anxiety symptoms: YES  History:  Caffeine intake: Small  Loss of appetite: No  Healthy diet: YES  Did you have problems in school or with previous employment: No  Family history of ADHD: YES  Have you had an evaluation for ADHD in the past: No  Do you use alcohol or drugs: YES  Therapies tried: Adderall (concerta) with moderate relief           Anxiety   How are you doing with your anxiety since your last visit? Improved   Are you having other symptoms that might be associated with anxiety? No  Have you had a significant life event? No   Are you feeling depressed? No  Do you have any concerns with your use of alcohol or other drugs? No          Wheezing  Over time seasonally this has gotten worse  Symptoms almost daily by the end of the day        Cystic acne  Symptoms have increased  Aldactone did not work  Has used a number of OTC products          Social History     Tobacco Use    Smoking status: Former     Current packs/day: 0.00     Types: Cigarettes     Start date:      Quit date:      Years since quittin.2     Passive exposure: Past    Smokeless tobacco: Never   Vaping Use    Vaping status: Never Used   Substance Use Topics    Alcohol use: Not Currently     Comment: occasionally    Drug use: No               3/27/2024     2:12 PM 2024    11:47 AM 2025     2:50 PM   BETSY-7 SCORE   Total Score  8 (mild anxiety) 6 (mild anxiety)   Total Score 10 8 6        Patient-reported               3/27/2024     2:12 PM 2024    11:46 AM 2025     4:26 PM   PHQ   PHQ-9 Total Score 5 6 6    Q9: Thoughts of better off dead/self-harm past 2 weeks Not at all Not at all Not at all       Patient-reported               2025     4:26 PM   Last PHQ-9   1.  Little interest or pleasure in doing things 1   2.  Feeling down, depressed, or hopeless 1   3.  Trouble falling or staying  asleep, or sleeping too much 1   4.  Feeling tired or having little energy 1   5.  Poor appetite or overeating 0   6.  Feeling bad about yourself 1   7.  Trouble concentrating 1   8.  Moving slowly or restless 0   Q9: Thoughts of better off dead/self-harm past 2 weeks 0   PHQ-9 Total Score 6        Patient-reported             4/9/2025     2:50 PM   BETSY-7    1. Feeling nervous, anxious, or on edge 1   2. Not being able to stop or control worrying 1   3. Worrying too much about different things 1   4. Trouble relaxing 1   5. Being so restless that it is hard to sit still 2   6. Becoming easily annoyed or irritable 0   7. Feeling afraid, as if something awful might happen 0   BETSY-7 Total Score 6    If you checked any problems, how difficult have they made it for you to do your work, take care of things at home, or get along with other people? Somewhat difficult       Patient-reported           Advance Care Planning  Patient does not have a Health Care Directive: Discussed advance care planning with patient; however, patient declined at this time.            4/6/2025   General Health   How would you rate your overall physical health? (!) FAIR   Feel stress (tense, anxious, or unable to sleep) Very much   (!) STRESS CONCERN              4/6/2025   Nutrition   Three or more servings of calcium each day? Yes   Diet: Regular (no restrictions)   How many servings of fruit and vegetables per day? (!) 0-1   How many sweetened beverages each day? 0-1             4/6/2025   Exercise   Days per week of moderate/strenous exercise 3 days   Average minutes spent exercising at this level 10 min         4/6/2025   Social Factors   Frequency of gathering with friends or relatives Twice a week   Worry food won't last until get money to buy more No   Food not last or not have enough money for food? No   Do you have housing? (Housing is defined as stable permanent housing and does not include staying ouside in a car, in a tent, in an  abandoned building, in an overnight shelter, or couch-surfing.) Yes   Are you worried about losing your housing? No   Lack of transportation? No   Unable to get utilities (heat,electricity)? No               2025   Dental   Dentist two times every year? Yes         Today's PHQ-9 Score:       2025     4:26 PM   PHQ-9 SCORE   PHQ-9 Total Score MyChart 6 (Mild depression)   PHQ-9 Total Score 6        Patient-reported               2025   Substance Use   Alcohol more than 3/day or more than 7/wk No   Do you use any other substances recreationally? No           Social History     Tobacco Use    Smoking status: Former     Current packs/day: 0.00     Types: Cigarettes     Start date:      Quit date: 2016     Years since quittin.2     Passive exposure: Past    Smokeless tobacco: Never   Vaping Use    Vaping status: Never Used   Substance Use Topics    Alcohol use: Not Currently     Comment: occasionally    Drug use: No            Mammogram Screening - Patient under 40 years of age: Routine Mammogram Screening not recommended.               2025   STI Screening   New sexual partner(s) since last STI/HIV test? (!) YES            History of abnormal Pap smear: No - age 30- 64 PAP with HPV every 5 years recommended        2023     3:09 PM 2019    11:35 AM 2015    12:00 AM   PAP / HPV   PAP Negative for Intraepithelial Lesion or Malignancy (NILM)      PAP (Historical)  NIL  NIL                2025   Contraception/Family Planning   Questions about contraception or family planning No            Meds                Past Medical History:   Diagnosis Date    ADHD (attention deficit hyperactivity disorder) 2017    Anxiety 2018    Ganglion cyst of wrist, right 2018    Tobacco dependence 2015         Past Surgical History:   Procedure Laterality Date     SECTION N/A 10/15/2017    Procedure:  SECTION;   Section;  Surgeon: Ramez Marquez MD;   Location: [facility] OR    ORTHOPEDIC SURGERY Right 2018    rt wrist , ganglion cyst removed    TEAR DUCT SURGERY Left     blocked tear duct - left    TONSILLECTOMY      tonsillitis         OB History    Para Term  AB Living   1 1 1 0 0 1   SAB IAB Ectopic Multiple Live Births   0 0 0 0 1      # Outcome Date GA Lbr Mark/2nd Weight Sex Type Anes PTL Lv   1 Term 10/15/17 40w6d  3.92 kg (8 lb 10.3 oz) F CS-LTranv   ALESSANDRA      Name: CEE GARCIAIELKATIE      Apgar1: 8  Apgar5: 9         Lab work is in process  Labs reviewed in EPIC  BP Readings from Last 3 Encounters:   25 110/64   24 122/79   24 115/80    Wt Readings from Last 3 Encounters:   25 63 kg (138 lb 12.8 oz)   24 61.1 kg (134 lb 11.2 oz)   24 64 kg (141 lb 3.2 oz)                  Patient Active Problem List   Diagnosis    ADHD (attention deficit hyperactivity disorder)    Anxiety    Generalized hyperhidrosis    Cystic acne    Wheezing     Past Surgical History:   Procedure Laterality Date     SECTION N/A 10/15/2017    Procedure:  SECTION;   Section;  Surgeon: Ramez Marquez MD;  Location: [facility] OR    ORTHOPEDIC SURGERY Right 2018    rt wrist , ganglion cyst removed    TEAR DUCT SURGERY Left     blocked tear duct - left    TONSILLECTOMY      tonsillitis       Social History     Tobacco Use    Smoking status: Former     Current packs/day: 0.00     Types: Cigarettes     Start date:      Quit date: 2016     Years since quittin.2     Passive exposure: Past    Smokeless tobacco: Never   Substance Use Topics    Alcohol use: Not Currently     Comment: occasionally     Family History   Problem Relation Age of Onset    Diabetes Maternal Grandfather     Heart Disease Maternal Grandfather         heart disease    Myocardial Infarction Paternal Aunt     Myocardial Infarction Paternal Grandfather         cause of death    Coronary Artery Disease Paternal Grandfather           at 46 - fatal heart attack    Myocardial Infarction Paternal Uncle     Coronary Artery Disease Paternal Uncle     Coronary Artery Disease Paternal Aunt              Current Outpatient Medications   Medication Sig Dispense Refill    albuterol (PROAIR HFA/PROVENTIL HFA/VENTOLIN HFA) 108 (90 Base) MCG/ACT inhaler Inhale 2 puffs into the lungs every 6 hours as needed for shortness of breath, wheezing or cough. 18 g 11    [START ON 2025] amphetamine-dextroamphetamine (ADDERALL XR) 10 MG 24 hr capsule Take 1 capsule (10 mg) by mouth daily. 30 capsule 0    [START ON 2025] amphetamine-dextroamphetamine (ADDERALL XR) 20 MG 24 hr capsule Take 1 capsule (20 mg) by mouth daily. 30 capsule 0    clindamycin-benzoyl peroxide (BENZACLIN) 1-5 % external gel Apply topically 2 times daily. 50 g 5    doxycycline hyclate (VIBRAMYCIN) 100 MG capsule 1 po BID for 2 weeks, then one daily 90 capsule 3    escitalopram (LEXAPRO) 10 MG tablet Take 1 tablet (10 mg) by mouth daily. 90 tablet 1    escitalopram (LEXAPRO) 20 MG tablet Take 1 tablet (20 mg) by mouth daily. 90 tablet 0    hydrOXYzine HCl (ATARAX) 25 MG tablet Take 1 tablet (25 mg) by mouth 3 times daily as needed for anxiety. 60 tablet 1         Allergies   Allergen Reactions    Bacitracin      Neosporin-Pt. Had eye swelling as a baby, Pt. States not even sure if she still has an allergy to neosporin    Bacitracin Zinc      Neosporin    Gramicidin D      Neosporin    Neomycin Sulfate      Neosporin    Polymyxin B      Neosporin    Polymyxin B Sulfate      Neosporin         Recent Labs   Lab Test 22  1350 08/10/18  1018 18  1507 10/16/17  0523 10/15/17  1102 10/14/17  1841 17  1050   A1C  --   --   --   --   --   --  5.6   ALT  --   --   --   --   --  11  --    CR  --  0.79  --  0.85   < > 1.11*  --    GFRESTIMATED  --  88  --  82   < > 60*  --    GFRESTBLACK  --  >90  --  >90   < > 73  --    POTASSIUM  --  4.0  --  4.1   < > 4.5  --    TSH 0.54   --  1.03  --   --   --   --     < > = values in this interval not displayed.               Review of Systems  Constitutional, HEENT, cardiovascular, pulmonary, GI, , musculoskeletal, neuro, skin, endocrine and psych systems are negative, except as otherwise noted.         Objective    Exam  /64 (BP Location: Right arm, Patient Position: Sitting, Cuff Size: Adult Regular)   Pulse 92   Temp 97.9  F (36.6  C) (Tympanic)   Resp 18   Ht 1.524 m (5')   Wt 63 kg (138 lb 12.8 oz)   SpO2 97%   BMI 27.11 kg/m     Estimated body mass index is 27.11 kg/m  as calculated from the following:    Height as of this encounter: 1.524 m (5').    Weight as of this encounter: 63 kg (138 lb 12.8 oz).        Physical Exam  GENERAL: alert and no distress  EYES: Eyes grossly normal to inspection, PERRL and conjunctivae and sclerae normal  HENT: ear canals and TM's normal, nose and mouth without ulcers or lesions  NECK: no adenopathy, no asymmetry, masses, or scars  RESP: lungs clear to auscultation - no rales, rhonchi or wheezes  CV: regular rate and rhythm, normal S1 S2, no S3 or S4, no murmur, click or rub, no peripheral edema  ABDOMEN: soft, nontender, no hepatosplenomegaly, no masses and bowel sounds normal  MS: no gross musculoskeletal defects noted, no edema  SKIN: acne  PSYCH: mentation appears normal, affect normal/bright          The longitudinal plan of care for the diagnosis(es)/condition(s) as documented were addressed during this visit. Due to the added complexity in care, I will continue to support Dorina in the subsequent management and with ongoing continuity of care.         Signed Electronically by: Rosa Hanson CNP

## 2025-05-18 ENCOUNTER — MYC REFILL (OUTPATIENT)
Dept: FAMILY MEDICINE | Facility: OTHER | Age: 30
End: 2025-05-18

## 2025-05-18 DIAGNOSIS — F90.0 ATTENTION DEFICIT HYPERACTIVITY DISORDER (ADHD), PREDOMINANTLY INATTENTIVE TYPE: ICD-10-CM

## 2025-05-18 DIAGNOSIS — L70.0 CYSTIC ACNE: ICD-10-CM

## 2025-05-19 ENCOUNTER — MYC REFILL (OUTPATIENT)
Dept: FAMILY MEDICINE | Facility: OTHER | Age: 30
End: 2025-05-19

## 2025-05-19 DIAGNOSIS — R06.2 WHEEZING: ICD-10-CM

## 2025-05-19 RX ORDER — ALBUTEROL SULFATE 90 UG/1
2 INHALANT RESPIRATORY (INHALATION) EVERY 6 HOURS PRN
Qty: 18 G | Refills: 11 | OUTPATIENT
Start: 2025-05-19

## 2025-05-19 NOTE — TELEPHONE ENCOUNTER
Adderall 20mg      Last Written Prescription Date:  4/19/25  Last Fill Quantity: 30,   # refills: 0  Last Office Visit: 4/9/25  Future Office visit:       Routing refill request to provider for review/approval because:      Adderall 10mg      Last Written Prescription Date:  4/19/25  Last Fill Quantity: 30,   # refills: 0  Last Office Visit: 4/9/25  Future Office visit:       Routing refill request to provider for review/approval because:      Vibramycin      Last Written Prescription Date:  4/9/25  Last Fill Quantity: 90,   # refills: 3  Last Office Visit: 4/9/25  Future Office visit:       Routing refill request to provider for review/approval because:

## 2025-05-20 RX ORDER — DEXTROAMPHETAMINE SACCHARATE, AMPHETAMINE ASPARTATE MONOHYDRATE, DEXTROAMPHETAMINE SULFATE AND AMPHETAMINE SULFATE 5; 5; 5; 5 MG/1; MG/1; MG/1; MG/1
20 CAPSULE, EXTENDED RELEASE ORAL DAILY
Qty: 30 CAPSULE | Refills: 0 | Status: SHIPPED | OUTPATIENT
Start: 2025-05-20

## 2025-05-20 RX ORDER — DEXTROAMPHETAMINE SACCHARATE, AMPHETAMINE ASPARTATE MONOHYDRATE, DEXTROAMPHETAMINE SULFATE AND AMPHETAMINE SULFATE 2.5; 2.5; 2.5; 2.5 MG/1; MG/1; MG/1; MG/1
10 CAPSULE, EXTENDED RELEASE ORAL DAILY
Qty: 30 CAPSULE | Refills: 0 | Status: SHIPPED | OUTPATIENT
Start: 2025-05-20

## 2025-05-20 RX ORDER — DOXYCYCLINE 100 MG/1
CAPSULE ORAL
Qty: 90 CAPSULE | Refills: 3 | Status: SHIPPED | OUTPATIENT
Start: 2025-05-20

## 2025-05-20 NOTE — TELEPHONE ENCOUNTER
amphetamine-dextroamphetamine (ADDERALL XR) 20 MG 24 hr capsule     amphetamine-dextroamphetamine (ADDERALL XR) 10 MG 24 hr capsule     Rx Protocol Controlled Substance [id number]/18/2025 07:47 PM   Protocol Details Urine drug screeen results on file in past 12 months    Controlled Substance Agreement on file in last 12 months    Auto Fail - Please forward to Provider       doxycycline hyclate (VIBRAMYCIN) 100 MG capsule       Routing refill request to provider for review/approval because:  Drug not on the [facility], [facility] or [facility] refill protocol or controlled substance

## 2025-06-16 ENCOUNTER — MYC REFILL (OUTPATIENT)
Dept: FAMILY MEDICINE | Facility: OTHER | Age: 30
End: 2025-06-16

## 2025-06-16 DIAGNOSIS — F90.0 ATTENTION DEFICIT HYPERACTIVITY DISORDER (ADHD), PREDOMINANTLY INATTENTIVE TYPE: ICD-10-CM

## 2025-06-17 RX ORDER — DEXTROAMPHETAMINE SACCHARATE, AMPHETAMINE ASPARTATE MONOHYDRATE, DEXTROAMPHETAMINE SULFATE AND AMPHETAMINE SULFATE 2.5; 2.5; 2.5; 2.5 MG/1; MG/1; MG/1; MG/1
10 CAPSULE, EXTENDED RELEASE ORAL DAILY
Qty: 30 CAPSULE | Refills: 0 | Status: SHIPPED | OUTPATIENT
Start: 2025-06-17

## 2025-06-17 RX ORDER — DEXTROAMPHETAMINE SACCHARATE, AMPHETAMINE ASPARTATE MONOHYDRATE, DEXTROAMPHETAMINE SULFATE AND AMPHETAMINE SULFATE 5; 5; 5; 5 MG/1; MG/1; MG/1; MG/1
20 CAPSULE, EXTENDED RELEASE ORAL DAILY
Qty: 30 CAPSULE | Refills: 0 | Status: SHIPPED | OUTPATIENT
Start: 2025-06-17

## 2025-06-17 NOTE — TELEPHONE ENCOUNTER
amphetamine-dextroamphetamine (ADDERALL XR) 20 MG 24 hr capsule         Last Written Prescription Date:  5/20/25  Last Fill Quantity: 30,   # refills: 0  Last Office Visit: 4/9/25  Future Office visit:       Routing refill request to provider for review/approval because:  Drug not on the [facility], [facility] or [facility] refill protocol or controlled substance  Rx Protocol Controlled Substance [id number]/16/2025 02:42 PM   Protocol Details Urine drug screeen results on file in past 12 months    Controlled Substance Agreement on file in last 12 months    Auto Fail - Please forward to Provider       amphetamine-dextroamphetamine (ADDERALL XR) 10 MG 24 hr capsule         Last Written Prescription Date:  5/20/25  Last Fill Quantity: 30,   # refills: 0  Last Office Visit: 4/9/25  Future Office visit:       Routing refill request to provider for review/approval because:  Drug not on the [facility], [facility] or [facility] refill protocol or controlled substance  Rx Protocol Controlled Substance [id number]/16/2025 02:42 PM   Protocol Details Urine drug screeen results on file in past 12 months    Controlled Substance Agreement on file in last 12 months    Auto Fail - Please forward to Provider

## 2025-06-25 ENCOUNTER — MYC REFILL (OUTPATIENT)
Dept: FAMILY MEDICINE | Facility: OTHER | Age: 30
End: 2025-06-25

## 2025-06-25 DIAGNOSIS — F41.9 ANXIETY: ICD-10-CM

## 2025-06-26 RX ORDER — HYDROXYZINE HYDROCHLORIDE 25 MG/1
25 TABLET, FILM COATED ORAL 3 TIMES DAILY PRN
Qty: 60 TABLET | Refills: 1 | Status: SHIPPED | OUTPATIENT
Start: 2025-06-26

## 2025-07-14 ENCOUNTER — MYC REFILL (OUTPATIENT)
Dept: FAMILY MEDICINE | Facility: OTHER | Age: 30
End: 2025-07-14

## 2025-07-14 DIAGNOSIS — F41.9 ANXIETY: ICD-10-CM

## 2025-07-14 DIAGNOSIS — F90.0 ATTENTION DEFICIT HYPERACTIVITY DISORDER (ADHD), PREDOMINANTLY INATTENTIVE TYPE: ICD-10-CM

## 2025-07-14 NOTE — TELEPHONE ENCOUNTER
Lexapro      Last Written Prescription Date:  3/19/25  Last Fill Quantity: 90,   # refills: 1  Last Office Visit: 4/9/25  Future Office visit:    Next 5 appointments (look out 90 days)      Oct 10, 2025 3:00 PM  (Arrive by 2:45 PM)  Provider Visit with Rosa Hanson CNP  [facility] ([facility] ) [address]  [place] [address]  [phone]             Routing refill request to provider for review/approval because:      Adderall 20mg      Last Written Prescription Date:  6/17/25  Last Fill Quantity: 30,   # refills: 0  Last Office Visit: 4/9/25  Future Office visit:    Next 5 appointments (look out 90 days)      Oct 10, 2025 3:00 PM  (Arrive by 2:45 PM)  Provider Visit with Rosa Hanson CNP  [facility] ([facility] ) [address]  [place] [address]  [phone]             Routing refill request to provider for review/approval because:      Adderall 10mg      Last Written Prescription Date:  6/17/25  Last Fill Quantity: 30,   # refills: 0  Last Office Visit: 4/9/25  Future Office visit:    Next 5 appointments (look out 90 days)      Oct 10, 2025 3:00 PM  (Arrive by 2:45 PM)  Provider Visit with Rosa Hanson CNP  [facility] ([facility] ) [address]  [place] [address]  [phone]             Routing refill request to provider for review/approval because:

## 2025-07-15 RX ORDER — DEXTROAMPHETAMINE SACCHARATE, AMPHETAMINE ASPARTATE MONOHYDRATE, DEXTROAMPHETAMINE SULFATE AND AMPHETAMINE SULFATE 2.5; 2.5; 2.5; 2.5 MG/1; MG/1; MG/1; MG/1
10 CAPSULE, EXTENDED RELEASE ORAL DAILY
Qty: 30 CAPSULE | Refills: 0 | Status: SHIPPED | OUTPATIENT
Start: 2025-07-15

## 2025-07-15 RX ORDER — ESCITALOPRAM OXALATE 20 MG/1
20 TABLET ORAL DAILY
Qty: 90 TABLET | Refills: 1 | Status: SHIPPED | OUTPATIENT
Start: 2025-07-15

## 2025-07-15 RX ORDER — DEXTROAMPHETAMINE SACCHARATE, AMPHETAMINE ASPARTATE MONOHYDRATE, DEXTROAMPHETAMINE SULFATE AND AMPHETAMINE SULFATE 5; 5; 5; 5 MG/1; MG/1; MG/1; MG/1
20 CAPSULE, EXTENDED RELEASE ORAL DAILY
Qty: 30 CAPSULE | Refills: 0 | Status: SHIPPED | OUTPATIENT
Start: 2025-07-15

## 2025-08-11 ENCOUNTER — MYC REFILL (OUTPATIENT)
Dept: FAMILY MEDICINE | Facility: OTHER | Age: 30
End: 2025-08-11

## 2025-08-11 DIAGNOSIS — F90.0 ATTENTION DEFICIT HYPERACTIVITY DISORDER (ADHD), PREDOMINANTLY INATTENTIVE TYPE: ICD-10-CM

## 2025-08-11 RX ORDER — DEXTROAMPHETAMINE SACCHARATE, AMPHETAMINE ASPARTATE MONOHYDRATE, DEXTROAMPHETAMINE SULFATE AND AMPHETAMINE SULFATE 5; 5; 5; 5 MG/1; MG/1; MG/1; MG/1
20 CAPSULE, EXTENDED RELEASE ORAL DAILY
Qty: 30 CAPSULE | Refills: 0 | Status: SHIPPED | OUTPATIENT
Start: 2025-08-11

## 2025-08-11 RX ORDER — DEXTROAMPHETAMINE SACCHARATE, AMPHETAMINE ASPARTATE MONOHYDRATE, DEXTROAMPHETAMINE SULFATE AND AMPHETAMINE SULFATE 2.5; 2.5; 2.5; 2.5 MG/1; MG/1; MG/1; MG/1
10 CAPSULE, EXTENDED RELEASE ORAL DAILY
Qty: 30 CAPSULE | Refills: 0 | Status: SHIPPED | OUTPATIENT
Start: 2025-08-11

## (undated) DEVICE — SUTURE-CHROMIC GUT 1 CTX 905H

## (undated) DEVICE — TUBING-CONNECTING 18" SUCTION

## (undated) DEVICE — GLV-8.5 BIOGEL LATEX

## (undated) DEVICE — IRRIGATION-H2O 1000ML

## (undated) DEVICE — TOWEL-OR DISP 4PKS

## (undated) DEVICE — DRSG-SPONGE STERILE 8 X 4

## (undated) DEVICE — GOWN-SURG XL LVL 3 REINFORCED

## (undated) DEVICE — SCD SLEEVE-KNEE REG.

## (undated) DEVICE — CATH TRAY-16FR METER W/STATLOCK LATEX]

## (undated) DEVICE — APPLICATOR-CHLORAPREP 26ML TINTED CHG 2%+ 70% IPA-SURGICAL

## (undated) DEVICE — Device

## (undated) DEVICE — TAPE-MEDIPORE 4" X 2YD

## (undated) DEVICE — SUTURE-VICRYL 0 CT J358H

## (undated) DEVICE — PACK-C-SECTION-CUSTOM

## (undated) DEVICE — SUTURE-PDS II 0 CTX Z370T

## (undated) DEVICE — LABEL-STERILE PREPRINTED FOR OR

## (undated) DEVICE — LIGHT HANDLE COVER

## (undated) DEVICE — SYRINGE-ASEPTO IRRIGATION

## (undated) DEVICE — CAUTERY PAD-POLYHESIVE II ADULT

## (undated) DEVICE — SURGICAL BINDER-ABDOMINAL 46-62

## (undated) DEVICE — IRRIGATION-NACL 1000ML

## (undated) DEVICE — CANISTER-SUCTION 2000CC

## (undated) RX ORDER — ONDANSETRON 2 MG/ML
INJECTION INTRAMUSCULAR; INTRAVENOUS
Status: DISPENSED
Start: 2017-10-15

## (undated) RX ORDER — OXYTOCIN 10 [USP'U]/ML
INJECTION, SOLUTION INTRAMUSCULAR; INTRAVENOUS
Status: DISPENSED
Start: 2017-10-15

## (undated) RX ORDER — MISOPROSTOL 200 UG/1
TABLET ORAL
Status: DISPENSED
Start: 2017-10-15

## (undated) RX ORDER — EPHEDRINE SULFATE 50 MG/ML
INJECTION, SOLUTION INTRAMUSCULAR; INTRAVENOUS; SUBCUTANEOUS
Status: DISPENSED
Start: 2017-10-15